# Patient Record
Sex: MALE | Race: WHITE | NOT HISPANIC OR LATINO | Employment: OTHER | URBAN - NONMETROPOLITAN AREA
[De-identification: names, ages, dates, MRNs, and addresses within clinical notes are randomized per-mention and may not be internally consistent; named-entity substitution may affect disease eponyms.]

---

## 2019-04-15 ENCOUNTER — APPOINTMENT (OUTPATIENT)
Dept: GENERAL RADIOLOGY | Facility: HOSPITAL | Age: 74
End: 2019-04-15

## 2019-04-15 ENCOUNTER — HOSPITAL ENCOUNTER (EMERGENCY)
Facility: HOSPITAL | Age: 74
Discharge: HOME | End: 2019-04-15
Attending: EMERGENCY MEDICINE | Admitting: EMERGENCY MEDICINE

## 2019-04-15 VITALS
HEIGHT: 69 IN | RESPIRATION RATE: 16 BRPM | WEIGHT: 220 LBS | BODY MASS INDEX: 32.58 KG/M2 | OXYGEN SATURATION: 96 % | HEART RATE: 64 BPM | DIASTOLIC BLOOD PRESSURE: 65 MMHG | TEMPERATURE: 97.8 F | SYSTOLIC BLOOD PRESSURE: 122 MMHG

## 2019-04-15 DIAGNOSIS — S60.222A CONTUSION OF LEFT HAND, INITIAL ENCOUNTER: Primary | ICD-10-CM

## 2019-04-15 PROCEDURE — 73130 X-RAY EXAM OF HAND: CPT

## 2019-04-15 PROCEDURE — 73130 X-RAY EXAM OF HAND: CPT | Performed by: RADIOLOGY

## 2019-04-15 PROCEDURE — 99283 EMERGENCY DEPT VISIT LOW MDM: CPT

## 2019-04-15 RX ORDER — AMIODARONE HYDROCHLORIDE 200 MG/1
200 TABLET ORAL DAILY
COMMUNITY
End: 2019-06-06 | Stop reason: SDUPTHER

## 2019-04-15 RX ORDER — POTASSIUM CITRATE 5 MEQ/1
20 TABLET, EXTENDED RELEASE ORAL
COMMUNITY
End: 2019-06-06

## 2019-04-15 RX ORDER — RANOLAZINE 500 MG/1
500 TABLET, EXTENDED RELEASE ORAL 2 TIMES DAILY
COMMUNITY
End: 2019-06-06 | Stop reason: SDUPTHER

## 2019-04-15 RX ORDER — GLIPIZIDE 5 MG/1
5 TABLET, FILM COATED, EXTENDED RELEASE ORAL DAILY
COMMUNITY
End: 2019-06-06 | Stop reason: SDUPTHER

## 2019-04-15 RX ORDER — EZETIMIBE AND SIMVASTATIN 10; 20 MG/1; MG/1
1 TABLET ORAL NIGHTLY
COMMUNITY
End: 2019-06-06 | Stop reason: SDUPTHER

## 2019-04-15 RX ORDER — CHLORTHALIDONE 25 MG/1
25 TABLET ORAL DAILY
COMMUNITY
End: 2019-06-08

## 2019-04-15 RX ORDER — FUROSEMIDE 40 MG/1
40 TABLET ORAL 2 TIMES DAILY
COMMUNITY
End: 2019-06-06

## 2019-04-15 RX ORDER — COLESEVELAM HYDROCHLORIDE 3.75 G/1
3.75 POWDER, FOR SUSPENSION ORAL DAILY
COMMUNITY
End: 2019-06-06 | Stop reason: SDUPTHER

## 2019-04-15 RX ORDER — AMLODIPINE BESYLATE AND BENAZEPRIL HYDROCHLORIDE 10; 20 MG/1; MG/1
1 CAPSULE ORAL DAILY
COMMUNITY
End: 2019-06-06 | Stop reason: SDUPTHER

## 2019-04-15 RX ORDER — PIOGLITAZONEHYDROCHLORIDE 15 MG/1
15 TABLET ORAL 3 TIMES DAILY
COMMUNITY
End: 2019-06-06

## 2019-04-15 RX ORDER — PROPAFENONE HYDROCHLORIDE 225 MG/1
225 TABLET, FILM COATED ORAL EVERY 8 HOURS
COMMUNITY
End: 2019-06-06 | Stop reason: SDUPTHER

## 2019-05-24 RX ORDER — METOPROLOL TARTRATE 100 MG/1
1 TABLET ORAL 2 TIMES DAILY
COMMUNITY
Start: 2011-12-20 | End: 2019-05-24 | Stop reason: SDUPTHER

## 2019-05-24 RX ORDER — METOPROLOL TARTRATE 100 MG/1
100 TABLET ORAL 2 TIMES DAILY
Qty: 60 TABLET | Refills: 0 | Status: SHIPPED | OUTPATIENT
Start: 2019-05-24 | End: 2019-06-20 | Stop reason: SDUPTHER

## 2019-06-06 ENCOUNTER — OFFICE VISIT (OUTPATIENT)
Dept: FAMILY MEDICINE CLINIC | Facility: CLINIC | Age: 74
End: 2019-06-06

## 2019-06-06 VITALS
HEIGHT: 69 IN | SYSTOLIC BLOOD PRESSURE: 120 MMHG | WEIGHT: 211 LBS | BODY MASS INDEX: 31.25 KG/M2 | TEMPERATURE: 98.6 F | DIASTOLIC BLOOD PRESSURE: 70 MMHG | HEART RATE: 61 BPM | RESPIRATION RATE: 12 BRPM | OXYGEN SATURATION: 98 %

## 2019-06-06 DIAGNOSIS — L40.9 PSORIASIS: ICD-10-CM

## 2019-06-06 DIAGNOSIS — G89.29 CHRONIC RIGHT SHOULDER PAIN: ICD-10-CM

## 2019-06-06 DIAGNOSIS — Z00.00 HEALTHCARE MAINTENANCE: ICD-10-CM

## 2019-06-06 DIAGNOSIS — Z87.442 HISTORY OF NEPHROLITHIASIS: ICD-10-CM

## 2019-06-06 DIAGNOSIS — E53.8 B12 DEFICIENCY: ICD-10-CM

## 2019-06-06 DIAGNOSIS — Z86.010 HISTORY OF ADENOMATOUS POLYP OF COLON: ICD-10-CM

## 2019-06-06 DIAGNOSIS — Z23 ENCOUNTER FOR IMMUNIZATION: ICD-10-CM

## 2019-06-06 DIAGNOSIS — M25.511 CHRONIC RIGHT SHOULDER PAIN: ICD-10-CM

## 2019-06-06 DIAGNOSIS — I25.10 CORONARY ARTERY DISEASE INVOLVING NATIVE CORONARY ARTERY OF NATIVE HEART WITHOUT ANGINA PECTORIS: ICD-10-CM

## 2019-06-06 DIAGNOSIS — H02.403 PTOSIS OF BOTH EYELIDS: ICD-10-CM

## 2019-06-06 DIAGNOSIS — I48.0 PAROXYSMAL ATRIAL FIBRILLATION (HCC): ICD-10-CM

## 2019-06-06 DIAGNOSIS — I10 ESSENTIAL HYPERTENSION: ICD-10-CM

## 2019-06-06 DIAGNOSIS — E11.42 TYPE 2 DIABETES MELLITUS WITH PERIPHERAL NEUROPATHY (HCC): ICD-10-CM

## 2019-06-06 DIAGNOSIS — K21.9 GASTROESOPHAGEAL REFLUX DISEASE WITHOUT ESOPHAGITIS: ICD-10-CM

## 2019-06-06 DIAGNOSIS — E78.2 MIXED HYPERLIPIDEMIA: Primary | ICD-10-CM

## 2019-06-06 DIAGNOSIS — N52.01 ERECTILE DYSFUNCTION DUE TO ARTERIAL INSUFFICIENCY: ICD-10-CM

## 2019-06-06 PROBLEM — N52.9 ERECTILE DYSFUNCTION: Status: ACTIVE | Noted: 2019-06-06

## 2019-06-06 PROCEDURE — 96372 THER/PROPH/DIAG INJ SC/IM: CPT | Performed by: GENERAL PRACTICE

## 2019-06-06 PROCEDURE — 90471 IMMUNIZATION ADMIN: CPT | Performed by: GENERAL PRACTICE

## 2019-06-06 PROCEDURE — 90632 HEPA VACCINE ADULT IM: CPT | Performed by: GENERAL PRACTICE

## 2019-06-06 PROCEDURE — 36415 COLL VENOUS BLD VENIPUNCTURE: CPT | Performed by: GENERAL PRACTICE

## 2019-06-06 PROCEDURE — 99204 OFFICE O/P NEW MOD 45 MIN: CPT | Performed by: GENERAL PRACTICE

## 2019-06-06 PROCEDURE — 20610 DRAIN/INJ JOINT/BURSA W/O US: CPT | Performed by: GENERAL PRACTICE

## 2019-06-06 RX ORDER — LIDOCAINE HYDROCHLORIDE 10 MG/ML
1 INJECTION, SOLUTION INFILTRATION; PERINEURAL ONCE
Status: COMPLETED | OUTPATIENT
Start: 2019-06-06 | End: 2019-06-06

## 2019-06-06 RX ORDER — COLESEVELAM HYDROCHLORIDE 3.75 G/1
3.75 POWDER, FOR SUSPENSION ORAL DAILY
Qty: 90 PACKET | Refills: 3 | Status: SHIPPED | OUTPATIENT
Start: 2019-06-06 | End: 2019-07-10

## 2019-06-06 RX ORDER — CYANOCOBALAMIN 1000 UG/ML
1000 INJECTION, SOLUTION INTRAMUSCULAR; SUBCUTANEOUS
Status: DISCONTINUED | OUTPATIENT
Start: 2019-06-06 | End: 2021-01-01

## 2019-06-06 RX ORDER — TRIAMCINOLONE ACETONIDE 40 MG/ML
40 INJECTION, SUSPENSION INTRA-ARTICULAR; INTRAMUSCULAR ONCE
Status: COMPLETED | OUTPATIENT
Start: 2019-06-06 | End: 2019-06-06

## 2019-06-06 RX ORDER — AMLODIPINE BESYLATE AND BENAZEPRIL HYDROCHLORIDE 10; 20 MG/1; MG/1
1 CAPSULE ORAL NIGHTLY
Qty: 90 CAPSULE | Refills: 3 | Status: SHIPPED | OUTPATIENT
Start: 2019-06-06 | End: 2019-06-28 | Stop reason: ALTCHOICE

## 2019-06-06 RX ORDER — GLIPIZIDE 5 MG/1
5 TABLET, FILM COATED, EXTENDED RELEASE ORAL DAILY
Qty: 90 TABLET | Refills: 3 | Status: SHIPPED | OUTPATIENT
Start: 2019-06-06 | End: 2019-07-18

## 2019-06-06 RX ORDER — PROPAFENONE HYDROCHLORIDE 225 MG/1
225 TABLET, FILM COATED ORAL EVERY 8 HOURS
Qty: 270 TABLET | Refills: 3 | Status: SHIPPED | OUTPATIENT
Start: 2019-06-06 | End: 2020-01-28 | Stop reason: SDUPTHER

## 2019-06-06 RX ORDER — LANOLIN ALCOHOL/MO/W.PET/CERES
1000 CREAM (GRAM) TOPICAL DAILY
Qty: 30 TABLET | Refills: 5 | Status: ON HOLD
Start: 2019-06-06 | End: 2021-01-01

## 2019-06-06 RX ORDER — RANOLAZINE 500 MG/1
500 TABLET, EXTENDED RELEASE ORAL 2 TIMES DAILY
Qty: 180 TABLET | Refills: 3 | Status: SHIPPED | OUTPATIENT
Start: 2019-06-06 | End: 2019-07-11 | Stop reason: ALTCHOICE

## 2019-06-06 RX ORDER — EZETIMIBE AND SIMVASTATIN 10; 20 MG/1; MG/1
1 TABLET ORAL NIGHTLY
Qty: 90 TABLET | Refills: 3 | Status: SHIPPED | OUTPATIENT
Start: 2019-06-06 | End: 2019-09-24

## 2019-06-06 RX ORDER — AMIODARONE HYDROCHLORIDE 200 MG/1
200 TABLET ORAL DAILY
Qty: 90 TABLET | Refills: 3 | Status: SHIPPED | OUTPATIENT
Start: 2019-06-06 | End: 2020-01-01 | Stop reason: SDUPTHER

## 2019-06-06 RX ADMIN — TRIAMCINOLONE ACETONIDE 40 MG: 40 INJECTION, SUSPENSION INTRA-ARTICULAR; INTRAMUSCULAR at 14:00

## 2019-06-06 RX ADMIN — LIDOCAINE HYDROCHLORIDE 1 ML: 10 INJECTION, SOLUTION INFILTRATION; PERINEURAL at 13:59

## 2019-06-06 RX ADMIN — CYANOCOBALAMIN 1000 MCG: 1000 INJECTION, SOLUTION INTRAMUSCULAR; SUBCUTANEOUS at 13:59

## 2019-06-06 NOTE — PROGRESS NOTES
Procedures  Shoulder Injection Procedure Note    Pre-operative Diagnosis: Right shoulder pain     Post-operative Diagnosis: Same    Indications: Symptomatic relief     Anesthesia: Lidocaine 1% without epinephrine     Procedure Details     Verbal consent was obtained for the procedure. The shoulder was prepped with iodine and the skin was anesthetized. Using a 25 gauge needle the glenohumeral joint is injected with 1 mL 1% lidocaine and 1 mL of triamcinolone (KENALOG) 40mg/ml under the lateral aspect of the acromion. The injection site was cleansed with topical isopropyl alcohol and a dressing was applied.    Complications:  None; patient tolerated the procedure well.

## 2019-06-06 NOTE — PROGRESS NOTES
Subjective   Aaron Delgado is a 73 y.o. male.     History of Present Illness     [facility] 73[facility].  He returned to [place] several months ago after living in [place] for the last 6 years.    Coronary Artery Disease  S/P CABG. Since last here he was started on apixaban along with amiodarone and propafenone for apparent atrial fibrillation.  While he admits to bilateral lower extremity edema there is no history of any chest pain or palpitations and he denies any lightheadedness, shortness of breath.  When started on apixaban he was advised to discontinue ASA    Type 2 Diabetes Mellitus  He admits to mild numbness and tingling of both feet.  There is no history of any polyuria, polydipsia, skin breakdown, or hypoglycemia.  He is currently on glipizide alone.  Has had no recent labs.  His last diabetic eye exam was within 1 year.  He was referred for a bilateral ptosis correction but did not proceed with this as the associated anesthesiologist apparently did not take his insurance    Hyperlipidemia  He is currently on simvastatin, ezetimibe, and colesevelam with no apparent side effects.    Hypertension  He is currently on metoprolol and amlodipine, and benazepril with no apparent side effects.    Right Shoulder Pain  He gives a long history of increasing right shoulder pain.  There is no history of any strain or trauma nor any change in his activities.  The pain is described as a sharp ache with activities above shoulder height.  This does not radiate elsewhere and has been on associated with any other symptoms.  There is no history of any stiffness or swelling and he denies any weakness, numbness, or tingling.  He is right-hand dominant.  He has received several corticosteroid injections her last 5 years generally with a marked improvement and would like another today    The following portions of the patient's history were reviewed and updated as appropriate: allergies,  current medications, past family history, past medical history, past social history, past surgical history and problem list.    Review of Systems   Constitutional: Negative for appetite change, chills, fatigue, fever and unexpected weight change.   HENT: Negative for congestion, ear pain, rhinorrhea, sneezing, sore throat and voice change.    Eyes: Negative for visual disturbance.   Respiratory: Negative for cough, shortness of breath and wheezing.    Cardiovascular: Positive for leg swelling. Negative for chest pain and palpitations.   Gastrointestinal: Negative for abdominal pain, blood in stool, constipation, diarrhea, nausea and vomiting.   Endocrine: Negative for polydipsia and polyuria.   Genitourinary: Negative for difficulty urinating, dysuria, frequency, hematuria and urgency.   Musculoskeletal: Positive for arthralgias. Negative for back pain, joint swelling, myalgias and neck pain.   Skin: Negative for color change.   Neurological: Negative for tremors, weakness, numbness and headaches.   Psychiatric/Behavioral: Negative for dysphoric mood, sleep disturbance and suicidal ideas. The patient is not nervous/anxious.      Objective   Physical Exam   Constitutional: He is oriented to person, place, and time. He appears well-developed and well-nourished. He is cooperative. He does not have a sickly appearance. No distress.   Bright and in good spirits. No apparent distress.  Wearing bilateral lower extremity compression hose.  No pallor, jaundice, diaphoresis, or cyanosis.   HENT:   Head: Atraumatic.   Right Ear: Tympanic membrane, external ear and ear canal normal.   Left Ear: Tympanic membrane, external ear and ear canal normal.   Mouth/Throat: Oropharynx is clear and moist.   Eyes: EOM are normal. Pupils are equal, round, and reactive to light. No scleral icterus.   Neck: No JVD present. Carotid bruit is not present. No tracheal deviation present. No thyromegaly present.   Cardiovascular: Normal rate,  regular rhythm, S1 normal, S2 normal, normal heart sounds and intact distal pulses. Exam reveals no gallop.   No murmur heard.  Pulmonary/Chest: Breath sounds normal. He has no wheezes. He has no rales.   Abdominal: Soft. Normal aorta and bowel sounds are normal. He exhibits no abdominal bruit and no mass. There is no hepatosplenomegaly. There is no tenderness. No hernia.   Musculoskeletal: He exhibits no deformity.        Right shoulder: He exhibits decreased range of motion (mild limitation of abduction and flexion) and bony tenderness (along AC and lateral GH joint). He exhibits no effusion and no crepitus.   Lymphadenopathy:        Head (right side): No submandibular adenopathy present.        Head (left side): No submandibular adenopathy present.     He has no cervical adenopathy.   Neurological: He is alert and oriented to person, place, and time. He has normal strength and normal reflexes. He displays normal reflexes. No cranial nerve deficit. He exhibits normal muscle tone. Coordination normal.   Skin: Skin is warm and dry. No rash noted. He is not diaphoretic. No pallor. Nails show no clubbing.   Psychiatric: He has a normal mood and affect. His behavior is normal.     Assessment/Plan   Problems Addressed this Visit        Cardiovascular and Mediastinum    CAD (coronary artery disease)  Reminded regarding the importance of risk factor modification.  Continue current medication  Referral to a local cardiologist    Relevant Medications    ranolazine (RANEXA) 500 MG 12 hr tablet    Other Relevant Orders    Ambulatory Referral to Cardiology    Mixed hyperlipidemia   Encouraged to continue to work on his diet and exercise plan.  Continue current medication  Updated labs drawn.  Will likely change to high-dose statin therapy at his return    Relevant Medications    colesevelam (WELCHOL) 3.75 g pack pack    ezetimibe-simvastatin (VYTORIN) 10-20 MG per tablet    Other Relevant Orders    Lipid Panel    TSH     Essential hypertension   Hypertension: at goal. Evidence of target organ damage: coronary artery disease.   As above  Continue current medication    Relevant Medications    amLODIPine-benazepril (LOTREL) 10-20 MG per capsule    Other Relevant Orders    CBC & Differential    Comprehensive Metabolic Panel    Paroxysmal atrial fibrillation (CMS/HCC)   Rate control is appropriate.. Patient is anticoagulated.   Avoid caffeine.  Avoid oral decongestants.  Continue current medication.   Follow up with cardiology.    Relevant Medications    amiodarone (PACERONE) 200 MG tablet    apixaban (ELIQUIS) 5 MG tablet tablet    propafenone (RYTHMOL) 225 MG tablet    ranolazine (RANEXA) 500 MG 12 hr tablet    Other Relevant Orders    Ambulatory Referral to Cardiology       Digestive    Gastroesophageal reflux disease without esophagitis    B12 deficiency  We will start on oral replacement with injections at visits  Will continue to monitor    Relevant Medications    cyanocobalamin injection 1,000 mcg    vitamin B-12 (CYANOCOBALAMIN) 1000 MCG tablet       Endocrine    Type 2 diabetes mellitus with peripheral neuropathy (CMS/HCC)  Diabetes mellitus Type II, under unknown control.   Encouraged to continue to pursue ADA diet  Encouraged aerobic exercise.  Continue current medication  Given his history of coronary artery disease will discuss replacement of glipizide with metformin and jardiance at his return    Relevant Medications    glipiZIDE (GLUCOTROL XL) 5 MG ER tablet    Other Relevant Orders    Hemoglobin A1c    MicroAlbumin, Urine, Random -       Nervous and Auditory    Chronic right shoulder pain  Advised regarding rest, gentle exercise, ice and heat.  Agreed on a corticosteroid injection.  See procedure note  Encouraged to report if any worse, any new symptoms, or if no better over the next week    Relevant Medications    lidocaine (XYLOCAINE) 1 % injection 1 mL (Completed)    triamcinolone acetonide (KENALOG-40) injection 40 mg  (Completed)       Musculoskeletal and Integument    Psoriasis       Genitourinary    Erectile dysfunction       Other    History of nephrolithiasis    Healthcare maintenance  Reviewed the potential benefits and risks of hepatitis A immunizations. Patient wished to proceed with this and dose # 1 administered. Patient is aware that a second dose is required in 6 months.  Also due for either a prevnar or pneumovax along with shingrix    Relevant Orders    Hepatitis C Antibody    Hepatitis A Vaccine Adult IM (Completed)    History of adenomatous polyp of colon    Ptosis of both eyelids  Referral to oculoplastic surgery      Relevant Orders    Ambulatory Referral to Ophthalmology      Other Visit Diagnoses     Encounter for immunization        Relevant Orders    Hepatitis A Vaccine Adult IM (Completed)

## 2019-06-07 LAB
ALBUMIN SERPL-MCNC: 3.9 G/DL (ref 3.5–5.2)
ALBUMIN/GLOB SERPL: 1.3 G/DL
ALP SERPL-CCNC: 203 U/L (ref 39–117)
ALT SERPL-CCNC: 73 U/L (ref 1–41)
AST SERPL-CCNC: 71 U/L (ref 1–40)
BASOPHILS # BLD AUTO: 0.05 10*3/MM3 (ref 0–0.2)
BASOPHILS NFR BLD AUTO: 0.9 % (ref 0–1.5)
BILIRUB SERPL-MCNC: 0.5 MG/DL (ref 0.2–1.2)
BUN SERPL-MCNC: 13 MG/DL (ref 8–23)
BUN/CREAT SERPL: 11 (ref 7–25)
CALCIUM SERPL-MCNC: 10.7 MG/DL (ref 8.6–10.5)
CHLORIDE SERPL-SCNC: 93 MMOL/L (ref 98–107)
CHOLEST SERPL-MCNC: 118 MG/DL (ref 0–200)
CO2 SERPL-SCNC: 22.5 MMOL/L (ref 22–29)
CREAT SERPL-MCNC: 1.18 MG/DL (ref 0.76–1.27)
EOSINOPHIL # BLD AUTO: 0.13 10*3/MM3 (ref 0–0.4)
EOSINOPHIL NFR BLD AUTO: 2.5 % (ref 0.3–6.2)
ERYTHROCYTE [DISTWIDTH] IN BLOOD BY AUTOMATED COUNT: 14.4 % (ref 12.3–15.4)
GLOBULIN SER CALC-MCNC: 3.1 GM/DL
GLUCOSE SERPL-MCNC: 114 MG/DL (ref 65–99)
HBA1C MFR BLD: 6.2 % (ref 4.8–5.6)
HCT VFR BLD AUTO: 43.2 % (ref 37.5–51)
HCV AB S/CO SERPL IA: 0.1 S/CO RATIO (ref 0–0.9)
HDLC SERPL-MCNC: 33 MG/DL (ref 40–60)
HGB BLD-MCNC: 13.9 G/DL (ref 13–17.7)
IMM GRANULOCYTES # BLD AUTO: 0.02 10*3/MM3 (ref 0–0.05)
IMM GRANULOCYTES NFR BLD AUTO: 0.4 % (ref 0–0.5)
LDLC SERPL CALC-MCNC: 42 MG/DL (ref 0–100)
LYMPHOCYTES # BLD AUTO: 1.08 10*3/MM3 (ref 0.7–3.1)
LYMPHOCYTES NFR BLD AUTO: 20.5 % (ref 19.6–45.3)
MCH RBC QN AUTO: 28.7 PG (ref 26.6–33)
MCHC RBC AUTO-ENTMCNC: 32.2 G/DL (ref 31.5–35.7)
MCV RBC AUTO: 89.1 FL (ref 79–97)
MICROALBUMIN UR-MCNC: 40.8 UG/ML
MONOCYTES # BLD AUTO: 0.76 10*3/MM3 (ref 0.1–0.9)
MONOCYTES NFR BLD AUTO: 14.4 % (ref 5–12)
NEUTROPHILS # BLD AUTO: 3.23 10*3/MM3 (ref 1.7–7)
NEUTROPHILS NFR BLD AUTO: 61.3 % (ref 42.7–76)
NRBC BLD AUTO-RTO: 0 /100 WBC (ref 0–0.2)
PLATELET # BLD AUTO: 348 10*3/MM3 (ref 140–450)
POTASSIUM SERPL-SCNC: 4.7 MMOL/L (ref 3.5–5.2)
PROT SERPL-MCNC: 7 G/DL (ref 6–8.5)
RBC # BLD AUTO: 4.85 10*6/MM3 (ref 4.14–5.8)
SODIUM SERPL-SCNC: 128 MMOL/L (ref 136–145)
TRIGL SERPL-MCNC: 217 MG/DL (ref 0–150)
TSH SERPL DL<=0.005 MIU/L-ACNC: 11.65 UIU/ML (ref 0.45–4.5)
VLDLC SERPL CALC-MCNC: 43.4 MG/DL
WBC # BLD AUTO: 5.27 10*3/MM3 (ref 3.4–10.8)

## 2019-06-08 DIAGNOSIS — E87.1 HYPONATREMIA: Primary | ICD-10-CM

## 2019-06-08 DIAGNOSIS — R74.8 ELEVATED LIVER ENZYMES: ICD-10-CM

## 2019-06-10 NOTE — PROGRESS NOTES
Spoke with pt about the following per Dr. Nur. VH    -- Needs CXR and U/S abdomen at [facility]  - mayra placed the orders in epic

## 2019-06-17 ENCOUNTER — HOSPITAL ENCOUNTER (OUTPATIENT)
Dept: ULTRASOUND IMAGING | Facility: HOSPITAL | Age: 74
Discharge: HOME | End: 2019-06-17
Admitting: GENERAL PRACTICE

## 2019-06-17 DIAGNOSIS — R74.8 ELEVATED LIVER ENZYMES: ICD-10-CM

## 2019-06-17 PROCEDURE — 76700 US EXAM ABDOM COMPLETE: CPT | Performed by: RADIOLOGY

## 2019-06-17 PROCEDURE — 76700 US EXAM ABDOM COMPLETE: CPT

## 2019-06-22 RX ORDER — METOPROLOL TARTRATE 100 MG/1
TABLET ORAL
Qty: 60 TABLET | Refills: 5 | Status: SHIPPED | OUTPATIENT
Start: 2019-06-22 | End: 2019-06-28 | Stop reason: ALTCHOICE

## 2019-06-26 ENCOUNTER — TELEPHONE (OUTPATIENT)
Dept: FAMILY MEDICINE CLINIC | Facility: CLINIC | Age: 74
End: 2019-06-26

## 2019-06-26 NOTE — TELEPHONE ENCOUNTER
LVM      ----- Message from Dillan Nur MD sent at 6/22/2019  9:45 AM EDT -----  Please let patient know that his abdo U/S was fine  I meant for a CXR to be done the same day - he can go for this anytime at the hospital or diagnostic center

## 2019-06-28 ENCOUNTER — OFFICE VISIT (OUTPATIENT)
Dept: FAMILY MEDICINE CLINIC | Facility: CLINIC | Age: 74
End: 2019-06-28

## 2019-06-28 DIAGNOSIS — R79.9 ABNORMAL BLOOD CHEMISTRY: ICD-10-CM

## 2019-06-28 DIAGNOSIS — R42 VERTIGO: ICD-10-CM

## 2019-06-28 DIAGNOSIS — I10 ESSENTIAL HYPERTENSION: Primary | ICD-10-CM

## 2019-06-28 PROCEDURE — 99214 OFFICE O/P EST MOD 30 MIN: CPT | Performed by: NURSE PRACTITIONER

## 2019-06-28 RX ORDER — METOPROLOL SUCCINATE 100 MG/1
100 TABLET, EXTENDED RELEASE ORAL NIGHTLY
Qty: 30 TABLET | Refills: 0 | Status: SHIPPED | OUTPATIENT
Start: 2019-06-28 | End: 2019-07-18

## 2019-06-28 RX ORDER — MECLIZINE HYDROCHLORIDE 25 MG/1
25 TABLET ORAL 3 TIMES DAILY PRN
Qty: 30 TABLET | Refills: 1 | Status: SHIPPED | OUTPATIENT
Start: 2019-06-28 | End: 2019-07-11

## 2019-06-28 RX ORDER — BENAZEPRIL HYDROCHLORIDE 10 MG/1
10 TABLET ORAL EVERY MORNING
Qty: 30 TABLET | Refills: 0 | Status: SHIPPED | OUTPATIENT
Start: 2019-06-28 | End: 2019-07-31 | Stop reason: SDUPTHER

## 2019-06-28 NOTE — PROGRESS NOTES
Aaron Delgado is a 73 y.o. male.who presents to the clinic today C/O dizziness which started appx three months ago. Associated symptoms include fatigue and dizziness which worsens when he is standing and walking. His wife gave him a motion sickness pill yesterday without adequate relief. Aaron reports initially these episodes would occur sporadically but over the past several weeks are more frequent. He brings in a record of his vital signs this morning which include: BP: 108 mg/dL; BS of 119 mg/dL and Pulse of 52. He does report his dizziness does not occur when sitting or lying but upon standing and walking.  Aaron does have a H/O DM, type 2 with mild neuropathy, Atrial Fib, Dyslipidemia, and CAD.    Dizziness   This is a new problem. The current episode started more than 1 month ago. The problem occurs intermittently. The problem has been waxing and waning. Associated symptoms include fatigue, vertigo and weakness. Pertinent negatives include no abdominal pain, anorexia, chest pain, chills, congestion, coughing, diaphoresis, fever, headaches, nausea, rash or vomiting. The symptoms are aggravated by standing.   Refer to ROS for additional information.    The following portions of the patient's history were reviewed and updated as appropriate: allergies, current medications, past family history, past medical history, past social history, past surgical history and problem list.    Current Outpatient Medications:   •  amiodarone (PACERONE) 200 MG tablet, Take 1 tablet by mouth Daily., Disp: 90 tablet, Rfl: 3  •  apixaban (ELIQUIS) 5 MG tablet tablet, Take 1 tablet by mouth Every 12 (Twelve) Hours., Disp: 180 tablet, Rfl: 3  •  benazepril (LOTENSIN) 10 MG tablet, Take 1 tablet by mouth Every Morning., Disp: 30 tablet, Rfl: 0  •  colesevelam (WELCHOL) 3.75 g pack pack, Take 1 packet by mouth Daily., Disp: 90 packet, Rfl: 3  •  ezetimibe-simvastatin (VYTORIN) 10-20 MG per tablet, Take 1 tablet by mouth Every  Night., Disp: 90 tablet, Rfl: 3  •  glipiZIDE (GLUCOTROL XL) 5 MG ER tablet, Take 1 tablet by mouth Daily., Disp: 90 tablet, Rfl: 3  •  meclizine (ANTIVERT) 25 MG tablet, Take 1 tablet by mouth 3 (Three) Times a Day As Needed for dizziness., Disp: 30 tablet, Rfl: 1  •  metoprolol succinate XL (TOPROL-XL) 100 MG 24 hr tablet, Take 1 tablet by mouth Every Night., Disp: 30 tablet, Rfl: 0  •  propafenone (RYTHMOL) 225 MG tablet, Take 1 tablet by mouth Every 8 (Eight) Hours., Disp: 270 tablet, Rfl: 3  •  ranolazine (RANEXA) 500 MG 12 hr tablet, Take 1 tablet by mouth 2 (Two) Times a Day., Disp: 180 tablet, Rfl: 3  •  vitamin B-12 (CYANOCOBALAMIN) 1000 MCG tablet, Take 1 tablet by mouth Daily., Disp: 30 tablet, Rfl: 5    Current Facility-Administered Medications:   •  cyanocobalamin injection 1,000 mcg, 1,000 mcg, Intramuscular, Q28 Days, Dillan Nur MD, 1,000 mcg at 06/06/19 1359    No Known Allergies    Review of Systems   Constitutional: Positive for activity change and fatigue. Negative for appetite change, chills, diaphoresis and fever. Unexpected weight change: Has had a weight loss for past three months.   HENT: Negative.  Negative for congestion and ear pain.    Respiratory: Negative for cough, chest tightness and shortness of breath.    Cardiovascular: Positive for leg swelling (Intermittent). Negative for chest pain and palpitations.   Gastrointestinal: Negative for abdominal pain, anorexia, constipation, diarrhea, nausea and vomiting.   Endocrine: Negative for cold intolerance, heat intolerance, polydipsia, polyphagia and polyuria.   Skin: Negative for color change and rash.   Neurological: Positive for dizziness, vertigo and weakness. Negative for tremors, speech difficulty and headaches.   Hematological: Negative for adenopathy.   Psychiatric/Behavioral: Negative for confusion, decreased concentration and suicidal ideas. The patient is not nervous/anxious.    All other systems reviewed and are  "negative.    Visit Vitals  /60 (BP Location: Right arm, Patient Position: Sitting, Cuff Size: Adult)   Pulse 95   Temp 98.2 °F (36.8 °C) (Temporal)   Resp 14   Ht 175.3 cm (69\")   Wt 97.5 kg (215 lb)   SpO2 96%   BMI 31.75 kg/m²     Physical Exam   Constitutional: He is oriented to person, place, and time. He appears well-developed and well-nourished. No distress.   HENT:   Head: Normocephalic.   Right Ear: Tympanic membrane and ear canal normal.   Left Ear: Tympanic membrane and ear canal normal.   Nose: Nose normal.   Mouth/Throat: Oropharynx is clear and moist and mucous membranes are normal. No oropharyngeal exudate.   Eyes: Conjunctivae are normal. Pupils are equal, round, and reactive to light. Right eye exhibits no discharge. Left eye exhibits no discharge. No scleral icterus.   Neck: Neck supple. No JVD present.   Cardiovascular: Normal rate, regular rhythm and normal heart sounds. Exam reveals no friction rub.   No murmur heard.  Pulmonary/Chest: Effort normal and breath sounds normal. No respiratory distress. He has no decreased breath sounds. He has no wheezes. He has no rhonchi. He has no rales.   Abdominal: Soft.   Musculoskeletal: He exhibits no edema.   Lymphadenopathy:     He has no cervical adenopathy.   Neurological: He is alert and oriented to person, place, and time.   Skin: Skin is warm and dry. Capillary refill takes less than 2 seconds. No rash noted. No erythema.   Psychiatric: He has a normal mood and affect. His speech is normal and behavior is normal. Judgment and thought content normal. Cognition and memory are normal.   Vitals reviewed.    Lab Results (last 24 hours)     Procedure Component Value Units Date/Time    Comprehensive Metabolic Panel [[id number]]  (Abnormal) Collected:  06/28/19 1404    Specimen:  Blood Updated:  06/29/19 0712     Glucose 74 mg/dL      BUN 23 mg/dL      Creatinine 1.51 mg/dL      eGFR Non African Am 46 mL/min/1.73      Comment: The MDRD GFR formula is " only valid for adults with stable  renal function between ages 18 and 70.          eGFR African Am 55 mL/min/1.73      BUN/Creatinine Ratio 15.2     Sodium 127 mmol/L      Potassium 5.0 mmol/L      Chloride 93 mmol/L      Total CO2 24.0 mmol/L      Calcium 9.8 mg/dL      Total Protein 6.9 g/dL      Albumin 3.90 g/dL      Globulin 3.0 gm/dL      A/G Ratio 1.3 g/dL      Total Bilirubin 0.4 mg/dL      Alkaline Phosphatase 149 U/L      AST (SGOT) 53 U/L      ALT (SGPT) 56 U/L     Narrative:       Performed at:  [address] [address] [address] [place], [place]  [id number]  : Tanmay Benson MD, Phone:  [phone]        Assessment/Plan   Diagnoses and all orders for this visit:    Essential hypertension  -     metoprolol succinate XL (TOPROL-XL) 100 MG 24 hr tablet; Take 1 tablet by mouth Every Night.  -     benazepril (LOTENSIN) 10 MG tablet; Take 1 tablet by mouth Every Morning.    Abnormal blood chemistry  -     Comprehensive Metabolic Panel    Vertigo  -     meclizine (ANTIVERT) 25 MG tablet; Take 1 tablet by mouth 3 (Three) Times a Day As Needed for dizziness.    Findings and recommendations discussed with Aaron .Counseled regarding supportive care and safety measures including no driving until his symptoms have consistently improved.  Discussed with Dr. Nur who recommended discontinuing his Lotrel 10-20 to Lotensin only in am and discontinuing Metoprolol 100 mg bid to Toprol  mg at bedtime. Also, reminded Aaron  to have his  Chest X ray done prior to his July 10 th appointment with Dr Nur. Provided him with a logbook to record daily blood sugar, blood pressure and pulse and bring it into the office on the July 10 th appointment.   This document has been electronically signed by AMARILIS Stewart, EDEN-BC, BRAULIO  June 29, 2019 9:53 AM

## 2019-06-29 VITALS
HEART RATE: 95 BPM | WEIGHT: 215 LBS | BODY MASS INDEX: 31.84 KG/M2 | SYSTOLIC BLOOD PRESSURE: 118 MMHG | DIASTOLIC BLOOD PRESSURE: 60 MMHG | TEMPERATURE: 98.2 F | OXYGEN SATURATION: 96 % | RESPIRATION RATE: 14 BRPM | HEIGHT: 69 IN

## 2019-06-29 DIAGNOSIS — E87.1 HYPONATREMIA: Primary | ICD-10-CM

## 2019-06-29 LAB
ALBUMIN SERPL-MCNC: 3.9 G/DL (ref 3.5–5.2)
ALBUMIN/GLOB SERPL: 1.3 G/DL
ALP SERPL-CCNC: 149 U/L (ref 39–117)
ALT SERPL-CCNC: 56 U/L (ref 1–41)
AST SERPL-CCNC: 53 U/L (ref 1–40)
BILIRUB SERPL-MCNC: 0.4 MG/DL (ref 0.2–1.2)
BUN SERPL-MCNC: 23 MG/DL (ref 8–23)
BUN/CREAT SERPL: 15.2 (ref 7–25)
CALCIUM SERPL-MCNC: 9.8 MG/DL (ref 8.6–10.5)
CHLORIDE SERPL-SCNC: 93 MMOL/L (ref 98–107)
CO2 SERPL-SCNC: 24 MMOL/L (ref 22–29)
CREAT SERPL-MCNC: 1.51 MG/DL (ref 0.76–1.27)
GLOBULIN SER CALC-MCNC: 3 GM/DL
GLUCOSE SERPL-MCNC: 74 MG/DL (ref 65–99)
POTASSIUM SERPL-SCNC: 5 MMOL/L (ref 3.5–5.2)
PROT SERPL-MCNC: 6.9 G/DL (ref 6–8.5)
SODIUM SERPL-SCNC: 127 MMOL/L (ref 136–145)

## 2019-07-05 ENCOUNTER — LAB (OUTPATIENT)
Dept: FAMILY MEDICINE CLINIC | Facility: CLINIC | Age: 74
End: 2019-07-05

## 2019-07-05 DIAGNOSIS — E87.1 HYPONATREMIA: ICD-10-CM

## 2019-07-05 PROCEDURE — 36415 COLL VENOUS BLD VENIPUNCTURE: CPT | Performed by: GENERAL PRACTICE

## 2019-07-06 LAB
ALBUMIN SERPL-MCNC: 4 G/DL (ref 3.5–5.2)
ALBUMIN/GLOB SERPL: 1.7 G/DL
ALP SERPL-CCNC: 115 U/L (ref 39–117)
ALT SERPL-CCNC: 37 U/L (ref 1–41)
AST SERPL-CCNC: 40 U/L (ref 1–40)
BILIRUB SERPL-MCNC: 0.4 MG/DL (ref 0.2–1.2)
BUN SERPL-MCNC: 19 MG/DL (ref 8–23)
BUN/CREAT SERPL: 13.9 (ref 7–25)
CALCIUM SERPL-MCNC: 9.7 MG/DL (ref 8.6–10.5)
CHLORIDE SERPL-SCNC: 92 MMOL/L (ref 98–107)
CO2 SERPL-SCNC: 23.8 MMOL/L (ref 22–29)
CREAT SERPL-MCNC: 1.37 MG/DL (ref 0.76–1.27)
GLOBULIN SER CALC-MCNC: 2.4 GM/DL
GLUCOSE SERPL-MCNC: 78 MG/DL (ref 65–99)
POTASSIUM SERPL-SCNC: 5 MMOL/L (ref 3.5–5.2)
PROT SERPL-MCNC: 6.4 G/DL (ref 6–8.5)
SODIUM SERPL-SCNC: 129 MMOL/L (ref 136–145)

## 2019-07-08 NOTE — PROGRESS NOTES
[facility] CARDIOLOGY  [address]  [place] [address]  Phone: [phone]  Fax: [phone]    07/10/2019    Chief Complaint   Patient presents with   • Coronary Artery Disease   • Hypertension   • Hyperlipidemia   • Establish Cardiologist        History:   Aaron Delgado is a 73 y.o. male seen in consultation, referred by Dr.Paul Nur for dizziness.  Aaron has a history of Paroxysmal atrial fib coagulated with Eliquis 5 mg BID, CAD (s/p CABG), hypertension, and dyslipidemia.  He has complaints today of dizziness. Onset was about 3 months ago and has increased over the last month.  I happens when he stands up and when he gets up form lying down. No changes since PCP changed medications.  Happens daily wit    Past Medical History:   Diagnosis Date   • A-fib (CMS/HCC)    • CHF (congestive heart failure) (CMS/[facility])    • Hypertension        Past Surgical History:   Procedure Laterality Date   • CORONARY ARTERY BYPASS GRAFT          Review of Systems:  Please see HPI  Constitution: No chills, no rigors, no unexplained weight loss or weight gain  Eyes:  No diplopia, no blurred vision, no loss of vision, conjunctiva is pink and sclera is anicteric  ENT:  No tinnitus, no otorrhea, no epistaxis, no sore throat   Respiratory: No cough, no hemoptysis  Cardiovascular: see HPI  Gastrointestinal: No nausea, no vomiting, no hematemesis, no diarrhea or constipation, no melena  Genitourinary: No frequency of dysuria no hematuria  Integument: No pruritis and  no skin rash  Hematologic / Lymphatic: No excessive bleeding, easy bruising, fatigue, lymphadenopathy and petechiae  Musculoskeletal: No joint pain, joint stiffness, joint swelling, muscle pain, muscle weakness and neck pain  Neurological: No dizziness, headaches, light headedness, seizures and vertigo  Endocrine: No frequent urination and nocturia, temperature intolerance, weight gain, unintended and weight loss,  unintended        Past Social History:  Social History     Socioeconomic History   • Marital status:      Spouse name: Not on file   • Number of children: Not on file   • Years of education: Not on file   • Highest education level: Not on file   Occupational History   • Occupation: Retired   Tobacco Use   • Smoking status: Never Smoker   • Smokeless tobacco: Never Used   Substance and Sexual Activity   • Alcohol use: No     Frequency: Never   • Drug use: No   • Sexual activity: Defer       Past Family History:  History reviewed. No pertinent family history.    Current Outpatient Medications   Medication Sig Dispense Refill   • amiodarone (PACERONE) 200 MG tablet Take 1 tablet by mouth Daily. 90 tablet 3   • apixaban (ELIQUIS) 5 MG tablet tablet Take 1 tablet by mouth Every 12 (Twelve) Hours. 180 tablet 3   • benazepril (LOTENSIN) 10 MG tablet Take 1 tablet by mouth Every Morning. 30 tablet 0   • chlorthalidone (HYGROTON) 25 MG tablet      • colesevelam (WELCHOL) 3.75 g pack pack Take 1 packet by mouth Daily. 90 packet 3   • glipiZIDE (GLUCOTROL XL) 5 MG ER tablet Take 1 tablet by mouth Daily. 90 tablet 3   • meclizine (ANTIVERT) 25 MG tablet Take 1 tablet by mouth 3 (Three) Times a Day As Needed for dizziness. 30 tablet 1   • metoprolol succinate XL (TOPROL-XL) 100 MG 24 hr tablet Take 1 tablet by mouth Every Night. 30 tablet 0   • propafenone (RYTHMOL) 225 MG tablet Take 1 tablet by mouth Every 8 (Eight) Hours. 270 tablet 3   • ranolazine (RANEXA) 500 MG 12 hr tablet Take 1 tablet by mouth 2 (Two) Times a Day. 180 tablet 3   • vitamin B-12 (CYANOCOBALAMIN) 1000 MCG tablet Take 1 tablet by mouth Daily. 30 tablet 5   • ezetimibe-simvastatin (VYTORIN) 10-20 MG per tablet Take 1 tablet by mouth Every Night. 90 tablet 3     Current Facility-Administered Medications   Medication Dose Route Frequency Provider Last Rate Last Dose   • cyanocobalamin injection 1,000 mcg  1,000 mcg Intramuscular Q28 Days Dillan Nur  MD Leonie   1,000 mcg at 06/06/19 1359        No Known Allergies    Objective:  Vitals:    07/10/19 1514   BP: 132/66   Pulse: 79   SpO2: 93%         Comfortable NAD  PERRL, conjunctiva clear  Neck supple, no JVD or thyromegaly appreciated  S1/S2 RRR, no m/r/g  Lungs CTA B, normal effort  Abdomen S/NT/ND (+) BS, no HSM appreciated  Extremities warm, no clubbing, cyanosis, or edema  Normal gait  No visible or palpable skin lesions  A/Ox4, mood and affect appropriate  Pulse exam:   Feet are warm bilateral  1+ edema bilateral  Capillary refill is normal  No evidence of ulceration or color change of the toes  PULSES  Right DP and PT are 1+ and Left DP and PT are 1+    DATA:              Results for orders placed during the hospital encounter of 06/17/19   US Abdomen Complete    Narrative EXAMINATION: US ABDOMEN COMPLETE-         CLINICAL INDICATION:     elevated liver enzymes; R74.8-Abnormal levels  of other serum enzymes     TECHNIQUE: Multiplanar gray scale ultrasound of the abdomen.      COMPARISON: NONE      FINDINGS:   Visualized pancreas is unremarkable.  The imaged portion of the abdominal aorta is nondilated.   The liver is homogeneous. There is no intrahepatic ductal dilatation or  focal hepatic mass.  The imaged portion of the hepatic vessels and inferior vena cava are  patent.  Gallbladder has been surgically removed  The common bile duct is normal, measuring 4.4 mm.  The kidneys demonstrate no evidence of hydronephrosis or solid renal  mass.  The spleen is homogeneous and measures  12.59 cm       Impression Nothing seen on today's exam to account for the patient's  symptoms.      This report was finalized on 6/17/2019 10:40 AM by Dr. Abner Walsh MD.            ECG 12 Lead  Date/Time: 7/10/2019 3:17 PM  Performed by: Ian Bishop MD  Authorized by: Ian Bishop MD           EKG normal sinus rhythm and normal    Assessment:  Benign positional vertigo  Dizziness  Paroxsymal atrial fib, rhythm  controlled with Amiodarone, Eliquis, and Rythmol  Hypertension  CAD S/P CABG  Dyslipidemia    Plan:    I will proceed with a Carotid US.   DC Ranexa, Chlorthalidone, and Welchol  Records from Dr. Chaney in [place]  Follow up 2 months    Patient's Body mass index is 30.83 kg/m². BMI is above normal parameters. Recommendations include: exercise counseling and nutrition counseling.         ICD-10-CM ICD-9-CM   1. Coronary artery disease involving native coronary artery of native heart without angina pectoris I25.10 414.01   2. Mixed hyperlipidemia E78.2 272.2   3. Essential hypertension I10 401.9   4. Paroxysmal atrial fibrillation (CMS/HCC) I48.0 427.31        Thank you for allowing me to participate in the care of Aaron Delgado. Feel free to contact me directly with any further questions or concerns.        Director, Cardiac Cath Lab      JOSE Corona, acting as scribe for Ian Bishop MD.   07/10/19  3:16 PM

## 2019-07-10 ENCOUNTER — OFFICE VISIT (OUTPATIENT)
Dept: CARDIOLOGY | Facility: CLINIC | Age: 74
End: 2019-07-10

## 2019-07-10 VITALS
WEIGHT: 208.8 LBS | HEIGHT: 69 IN | BODY MASS INDEX: 30.93 KG/M2 | HEART RATE: 79 BPM | SYSTOLIC BLOOD PRESSURE: 132 MMHG | OXYGEN SATURATION: 93 % | DIASTOLIC BLOOD PRESSURE: 66 MMHG

## 2019-07-10 DIAGNOSIS — I10 ESSENTIAL HYPERTENSION: ICD-10-CM

## 2019-07-10 DIAGNOSIS — E78.2 MIXED HYPERLIPIDEMIA: ICD-10-CM

## 2019-07-10 DIAGNOSIS — I48.0 PAROXYSMAL ATRIAL FIBRILLATION (HCC): ICD-10-CM

## 2019-07-10 DIAGNOSIS — R42 DIZZINESS: ICD-10-CM

## 2019-07-10 DIAGNOSIS — I25.10 CORONARY ARTERY DISEASE INVOLVING NATIVE CORONARY ARTERY OF NATIVE HEART WITHOUT ANGINA PECTORIS: Primary | ICD-10-CM

## 2019-07-10 PROCEDURE — 93005 ELECTROCARDIOGRAM TRACING: CPT | Performed by: INTERNAL MEDICINE

## 2019-07-10 PROCEDURE — 99204 OFFICE O/P NEW MOD 45 MIN: CPT | Performed by: INTERNAL MEDICINE

## 2019-07-10 RX ORDER — CHLORTHALIDONE 25 MG/1
TABLET ORAL
COMMUNITY
Start: 2019-06-25 | End: 2019-07-10

## 2019-07-11 ENCOUNTER — OFFICE VISIT (OUTPATIENT)
Dept: FAMILY MEDICINE CLINIC | Facility: CLINIC | Age: 74
End: 2019-07-11

## 2019-07-11 VITALS
HEIGHT: 69 IN | OXYGEN SATURATION: 97 % | DIASTOLIC BLOOD PRESSURE: 60 MMHG | BODY MASS INDEX: 31.7 KG/M2 | HEART RATE: 70 BPM | SYSTOLIC BLOOD PRESSURE: 130 MMHG | TEMPERATURE: 97.7 F | RESPIRATION RATE: 14 BRPM | WEIGHT: 214 LBS

## 2019-07-11 DIAGNOSIS — R42 DIZZINESS: ICD-10-CM

## 2019-07-11 DIAGNOSIS — E87.1 HYPONATREMIA: ICD-10-CM

## 2019-07-11 DIAGNOSIS — I10 ESSENTIAL HYPERTENSION: Primary | Chronic | ICD-10-CM

## 2019-07-11 DIAGNOSIS — R74.8 ELEVATED LIVER ENZYMES: ICD-10-CM

## 2019-07-11 DIAGNOSIS — E11.42 TYPE 2 DIABETES MELLITUS WITH PERIPHERAL NEUROPATHY (HCC): Chronic | ICD-10-CM

## 2019-07-11 PROCEDURE — 99214 OFFICE O/P EST MOD 30 MIN: CPT | Performed by: NURSE PRACTITIONER

## 2019-07-11 RX ORDER — PIOGLITAZONEHYDROCHLORIDE 15 MG/1
15 TABLET ORAL DAILY
COMMUNITY
End: 2019-07-18

## 2019-07-11 NOTE — PROGRESS NOTES
History of Present Illness   Aaron Delgado is a 73 Y.O. male.who presents to the clinic today for follow up related to his recent labs pertaining to his hyponatremia and abnormal lab results he had on June 6th and June 28 th. In addition, several medications were changed in relation to his  dizziness which started appx three months ago. Associated symptoms include fatigue and dizziness which worsens when he is standing and walking. He was prescribed Meclizine at his last appointment which he reports has not helped.  Aaron reports initially these episodes would occur sporadically but over the past several weeks are more frequent. He brings in a record of his blood pressure and glucose readings for the past week.  Aaron does have a H/O DM, type 2 with mild neuropathy, Atrial Fib, Dyslipidemia, and CAD.  He did have an appointment with Dr Bishop, Interventionist Cardiologist, yesterday who discontinued his Ranexa.     Dizziness   This is a new problem. The current episode started more than 1 month ago. The problem occurs intermittently. The problem has been waxing and waning. Associated symptoms include fatigue, vertigo and weakness. Pertinent negatives include no abdominal pain, anorexia, chest pain, chills, congestion, coughing, diaphoresis, fever, headaches, nausea, rash or vomiting. The symptoms are aggravated by standing.   Refer to ROS for additional information.    The following portions of the patient's history were reviewed and updated as appropriate: allergies, current medications, past family history, past medical history, past social history, past surgical history and problem list.    Current Outpatient Medications:   •  amiodarone (PACERONE) 200 MG tablet, Take 1 tablet by mouth Daily., Disp: 90 tablet, Rfl: 3  •  apixaban (ELIQUIS) 5 MG tablet tablet, Take 1 tablet by mouth Every 12 (Twelve) Hours., Disp: 180 tablet, Rfl: 3  •  benazepril (LOTENSIN) 10 MG tablet, Take 1 tablet by mouth Every  "Morning., Disp: 30 tablet, Rfl: 0  •  ezetimibe-simvastatin (VYTORIN) 10-20 MG per tablet, Take 1 tablet by mouth Every Night., Disp: 90 tablet, Rfl: 3  •  glipiZIDE (GLUCOTROL XL) 5 MG ER tablet, Take 1 tablet by mouth Daily., Disp: 90 tablet, Rfl: 3  •  metoprolol succinate XL (TOPROL-XL) 100 MG 24 hr tablet, Take 1 tablet by mouth Every Night., Disp: 30 tablet, Rfl: 0  •  pioglitazone (ACTOS) 15 MG tablet, Take 15 mg by mouth Daily., Disp: , Rfl:   •  propafenone (RYTHMOL) 225 MG tablet, Take 1 tablet by mouth Every 8 (Eight) Hours., Disp: 270 tablet, Rfl: 3  •  vitamin B-12 (CYANOCOBALAMIN) 1000 MCG tablet, Take 1 tablet by mouth Daily., Disp: 30 tablet, Rfl: 5    Current Facility-Administered Medications:   •  cyanocobalamin injection 1,000 mcg, 1,000 mcg, Intramuscular, Q28 Days, Dillan Nur MD, 1,000 mcg at 06/06/19 1359    No Known Allergies    Review of Systems   Constitutional: Positive for activity change and fatigue. Negative for appetite change and fever.   HENT: Negative for congestion.    Eyes: Negative for visual disturbance.   Respiratory: Negative for cough, shortness of breath and wheezing.    Cardiovascular: Positive for leg swelling (Intermittent). Negative for chest pain.   Gastrointestinal: Negative for nausea and vomiting.   Musculoskeletal: Positive for gait problem.   Skin: Negative for color change and rash.   Neurological: Positive for dizziness and weakness.   Hematological: Negative for adenopathy. Bruises/bleeds easily.     Visit Vitals  /60 (BP Location: Left arm, Patient Position: Sitting, Cuff Size: Adult)   Pulse 70   Temp 97.7 °F (36.5 °C) (Oral)   Resp 14   Ht 175.3 cm (69\")   Wt 97.1 kg (214 lb)   SpO2 97%   BMI 31.60 kg/m²     Physical Exam   Constitutional: He is oriented to person, place, and time. He appears well-developed and well-nourished. No distress.   HENT:   Head: Normocephalic.   Right Ear: Tympanic membrane and ear canal normal.   Left Ear: " Tympanic membrane and ear canal normal.   Nose: Nose normal.   Mouth/Throat: Oropharynx is clear and moist. No oropharyngeal exudate.   Eyes: Conjunctivae are normal. Pupils are equal, round, and reactive to light. No scleral icterus.   Neck: Neck supple. No tracheal tenderness present.   Cardiovascular: Normal rate, regular rhythm and normal heart sounds. Exam reveals no friction rub.   No murmur heard.  Pulmonary/Chest: Effort normal and breath sounds normal. No respiratory distress. He has no wheezes. He has no rales.   Musculoskeletal: He exhibits no edema or tenderness.   Lymphadenopathy:     He has no cervical adenopathy.   Neurological: He is alert and oriented to person, place, and time. No cranial nerve deficit.   Skin: Skin is warm and dry. No rash noted. No erythema.   Psychiatric: He has a normal mood and affect. His speech is normal and behavior is normal. Thought content normal. Cognition and memory are normal.   Vitals reviewed.      Results for orders placed or performed in visit on 07/05/19   Comprehensive Metabolic Panel   Result Value Ref Range    Glucose 78 65 - 99 mg/dL    BUN 19 8 - 23 mg/dL    Creatinine 1.37 (H) 0.76 - 1.27 mg/dL    eGFR Non African Am 51 (L) >60 mL/min/1.73    eGFR African Am 62 >60 mL/min/1.73    BUN/Creatinine Ratio 13.9 7.0 - 25.0    Sodium 129 (L) 136 - 145 mmol/L    Potassium 5.0 3.5 - 5.2 mmol/L    Chloride 92 (L) 98 - 107 mmol/L    Total CO2 23.8 22.0 - 29.0 mmol/L    Calcium 9.7 8.6 - 10.5 mg/dL    Total Protein 6.4 6.0 - 8.5 g/dL    Albumin 4.00 3.50 - 5.20 g/dL    Globulin 2.4 gm/dL    A/G Ratio 1.7 g/dL    Total Bilirubin 0.4 0.2 - 1.2 mg/dL    Alkaline Phosphatase 115 39 - 117 U/L    AST (SGOT) 40 1 - 40 U/L    ALT (SGPT) 37 1 - 41 U/L       Assessment/Plan   Diagnoses and all orders for this visit:    Essential hypertension  Comments:  Home bp readings were reviewed.     Type 2 diabetes mellitus with peripheral neuropathy (CMS/HCC)  Comments:  Blood glucose log  reviewed. Questions arose regarding his Metformin.     Elevated liver enzymes  Comments:  Improved lab results    Hyponatremia  Comments:  Mildly improved Na+ level    Dizziness  Comments:  Continue to monitor and hopefull discontinuation of Ranexa will help.       Findings and recommendations discussed with Aaron. Reviewed his recent lab results with him indicated improved liver function tests and sodium levels. He did bring in a daily log of his Blood pressure and glucose from June 29 th to present which was reviewed with him. Hopefully with the discontinuation of Ranexa his symptoms will improve. His medications were reviewed. Medications were discontinued which have been discontinued but he is taking a form of Metformin which is not on his list and taking it three times daily. Requested he bring in ALL of the medications he is currently taking so we have a complete accurate list of his medications when he sees Dr Nur. Also, reminded him to have his Chest X ray done prior to his appointment with Dr Nur on July 18 th. He will f/u with me on a prn basis.          This document has been electronically signed by AMARILIS Stewart, EDEN-BC, BRAULIO

## 2019-07-11 NOTE — PATIENT INSTRUCTIONS
Type 2 Diabetes Mellitus, Self Care, Adult  When you have type 2 diabetes (type 2 diabetes mellitus), you must make sure your blood sugar (glucose) stays in a healthy range. You can do this with:  · Nutrition.  · Exercise.  · Lifestyle changes.  · Medicines or insulin, if needed.  · Support from your doctors and others.    How to stay aware of blood sugar  · Check your blood sugar level every day, as often as told.  · Have your A1c (hemoglobin A1c) level checked two or more times a year. Have it checked more often if your doctor tells you to.  Your doctor will set personal treatment goals for you. Generally, you should have these blood sugar levels:  · Before meals (preprandial):  mg/dL (4.4-7.2 mmol/L).  · After meals (postprandial): below 180 mg/dL (10 mmol/L).  · A1c level: less than 7%.    How to manage high and low blood sugar  Signs of high blood sugar  High blood sugar is called hyperglycemia. Know the signs of high blood sugar. Signs may include:  · Feeling:  ? Thirsty.  ? Hungry.  ? Very tired.  · Needing to pee (urinate) more than usual.  · Blurry vision.    Signs of low blood sugar  Low blood sugar is called hypoglycemia. This is when blood sugar is at or below 70 mg/dL (3.9 mmol/L). Signs may include:  · Feeling:  ? Hungry.  ? Worried or nervous (anxious).  ? Sweaty and clammy.  ? Confused.  ? Dizzy.  ? Sleepy.  ? Sick to your stomach (nauseous).  · Having:  ? A fast heartbeat.  ? A headache.  ? A change in your vision.  ? Jerky movements that you cannot control (seizure).  ? Tingling or no feeling (numbness) around your mouth, lips, or tongue.  · Having trouble with:  ? Moving (coordination).  ? Sleeping.  ? Passing out (fainting).  ? Getting upset easily (irritability).    Treating low blood sugar  To treat low blood sugar, eat or drink something sugary right away. If you can think clearly and swallow safely, follow the 15:15 rule:  · Take 15 grams of a fast-acting carb (carbohydrate). Some  fast-acting carbs are:  ? 1 tube of glucose gel.  ? 3 sugar tablets (glucose pills).  ? 6-8 pieces of hard candy.  ? 4 oz (120 mL) of fruit juice.  ? 4 oz (120 mL) regular (not diet) soda.  · Check your blood sugar 15 minutes after you take the carb.  · If your blood sugar is still at or below 70 mg/dL (3.9 mmol/L), take 15 grams of a carb again.  · If your blood sugar does not go above 70 mg/dL (3.9 mmol/L) after 3 tries, get help right away.  · After your blood sugar goes back to normal, eat a meal or a snack within 1 hour.    Treating very low blood sugar  If your blood sugar is at or below 54 mg/dL (3 mmol/L), you have very low blood sugar (severe hypoglycemia). This is an emergency. Do not wait to see if the symptoms will go away. Get medical help right away. Call your local emergency services (911 in the [place]  If you have very low blood sugar and you cannot eat or drink, you may need a glucagon shot (injection). A family member or friend should learn how to check your blood sugar and how to give you a glucagon shot. Ask your doctor if you need to have a glucagon shot kit at home.  Follow these instructions at home:  Medicine  · Take insulin and diabetes medicines as told.  · If your doctor says you should take more or less insulin and medicines, do this exactly as told.  · Do not run out of insulin or medicines.  Having diabetes can raise your risk for other long-term conditions. These include heart disease and kidney disease. Your doctor may prescribe medicines to help you not have these problems.  Food  · Make healthy food choices. These include:  ? Chicken, fish, egg whites, and beans.  ? Oats, whole wheat, bulgur, brown rice, quinoa, and millet.  ? Fresh fruits and vegetables.  ? Low-fat dairy products.  ? Nuts, avocado, olive oil, and canola oil.  · Meet with a  (dietitian). He or she can help you make an eating plan that is right for you.  · Follow instructions from your doctor about  what you cannot eat or drink.  · Drink enough fluid to keep your pee (urine) pale yellow.  · Keep track of carbs that you eat. Do this by reading food labels and learning food serving sizes.  · Follow your sick day plan when you cannot eat or drink normally. Make this plan with your doctor so it is ready to use.  Activity  · Exercise 3 or more times a week.  · Do not go more than 2 days without exercising.  · Talk with your doctor before you start a new exercise. Your doctor may need to tell you to change:  ? How much insulin or medicines you take.  ? How much food you eat.  Lifestyle  · Do not use any tobacco products. These include cigarettes, chewing tobacco, and e-cigarettes. If you need help quitting, ask your doctor.  · Ask your doctor how much alcohol is safe for you.  · Learn to deal with stress. If you need help with this, ask your doctor.  Body care  · Stay up to date with your shots (immunizations).  · Have your eyes and feet checked by a doctor as often as told.  · Check your skin and feet every day. Check for cuts, bruises, redness, blisters, or sores.  · Brush your teeth and gums two times a day. Floss one or more times a day.  · Go to the dentist one or more times every 6 months.  · Stay at a healthy weight.  General instructions  · Take over-the-counter and prescription medicines only as told by your doctor.  · Share your diabetes care plan with:  ? Your work or school.  ? People you live with.  · Carry a card or wear jewelry that says you have diabetes.  · Keep all follow-up visits as told by your doctor. This is important.  Questions to ask your doctor  · Do I need to meet with a diabetes educator?  · Where can I find a support group for people with diabetes?  Where to find more information  To learn more about diabetes, visit:  · American Diabetes Association: www.diabetes.org  · American Association of Diabetes Educators: www.diabeteseducator.org    Summary  · When you have type 2 diabetes, you  must make sure your blood sugar (glucose) stays in a healthy range.  · Check your blood sugar every day, as often as told.  · Having diabetes can raise your risk for other conditions. Your doctor may prescribe medicines to help you not have these problems.  · Keep all follow-up visits as told by your doctor. This is important.  This information is not intended to replace advice given to you by your health care provider. Make sure you discuss any questions you have with your health care provider.  Document Released: 04/10/2017 Document Revised: 07/20/2018 Document Reviewed: 01/20/2017  [employer] Interactive Patient Education © 2019 [employer]  Dizziness  Dizziness is a common problem. It makes you feel unsteady or light-headed. You may feel like you are about to pass out (faint). Dizziness can lead to getting hurt if you stumble or fall. Dizziness can be caused by many things, including:  · Medicines.  · Not having enough water in your body (dehydration).  · Illness.    Follow these instructions at home:  Eating and drinking  · Drink enough fluid to keep your pee (urine) clear or pale yellow. This helps to keep you from getting dehydrated. Try to drink more clear fluids, such as water.  · Do not drink alcohol.  · Limit how much caffeine you drink or eat, if your doctor tells you to do that.  · Limit how much salt (sodium) you drink or eat, if your doctor tells you to do that.  Activity  · Avoid making quick movements.  ? When you stand up from sitting in a chair, steady yourself until you feel okay.  ? In the morning, first sit up on the side of the bed. When you feel okay, stand slowly while you hold onto something. Do this until you know that your balance is fine.  · If you need to  one place for a long time, move your legs often. Tighten and relax the muscles in your legs while you are standing.  · Do not drive or use heavy machinery if you feel dizzy.  · Avoid bending down if you feel dizzy. Place items  in your home so you can reach them easily without leaning over.  Lifestyle  · Do not use any products that contain nicotine or tobacco, such as cigarettes and e-cigarettes. If you need help quitting, ask your doctor.  · Try to lower your stress level. You can do this by using methods such as yoga or meditation. Talk with your doctor if you need help.  General instructions  · Watch your dizziness for any changes.  · Take over-the-counter and prescription medicines only as told by your doctor. Talk with your doctor if you think that you are dizzy because of a medicine that you are taking.  · Tell a friend or a family member that you are feeling dizzy. If he or she notices any changes in your behavior, have this person call your doctor.  · Keep all follow-up visits as told by your doctor. This is important.  Contact a doctor if:  · Your dizziness does not go away.  · Your dizziness or light-headedness gets worse.  · You feel sick to your stomach (nauseous).  · You have trouble hearing.  · You have new symptoms.  · You are unsteady on your feet.  · You feel like the room is spinning.  Get help right away if:  · You throw up (vomit) or have watery poop (diarrhea), and you cannot eat or drink anything.  · You have trouble:  ? Talking.  ? Walking.  ? Swallowing.  ? Using your arms, hands, or legs.  · You feel generally weak.  · You are not thinking clearly, or you have trouble forming sentences. A friend or family member may notice this.  · You have:  ? Chest pain.  ? Pain in your belly (abdomen).  ? Shortness of breath.  ? Sweating.  · Your vision changes.  · You are bleeding.  · You have a very bad headache.  · You have neck pain or a stiff neck.  · You have a fever.  These symptoms may be an emergency. Do not wait to see if the symptoms will go away. Get medical help right away. Call your local emergency services (911 in the [place] Do not drive yourself to the hospital.  Summary  · Dizziness makes you feel unsteady or  light-headed. You may feel like you are about to pass out (faint).  · Drink enough fluid to keep your pee (urine) clear or pale yellow. Do not drink alcohol.  · Avoid making quick movements if you feel dizzy.  · Watch your dizziness for any changes.  This information is not intended to replace advice given to you by your health care provider. Make sure you discuss any questions you have with your health care provider.  Document Released: 12/06/2012 Document Revised: 01/04/2018 Document Reviewed: 01/04/2018  [employer] Interactive Patient Education © 2019 [employer]

## 2019-07-15 ENCOUNTER — HOSPITAL ENCOUNTER (OUTPATIENT)
Dept: GENERAL RADIOLOGY | Facility: HOSPITAL | Age: 74
Discharge: HOME | End: 2019-07-15
Admitting: GENERAL PRACTICE

## 2019-07-15 PROCEDURE — 71046 X-RAY EXAM CHEST 2 VIEWS: CPT

## 2019-07-15 PROCEDURE — 71046 X-RAY EXAM CHEST 2 VIEWS: CPT | Performed by: RADIOLOGY

## 2019-07-17 ENCOUNTER — HOSPITAL ENCOUNTER (OUTPATIENT)
Dept: CARDIOLOGY | Facility: HOSPITAL | Age: 74
Discharge: HOME | End: 2019-07-17
Admitting: INTERNAL MEDICINE

## 2019-07-17 DIAGNOSIS — I48.0 PAROXYSMAL ATRIAL FIBRILLATION (HCC): ICD-10-CM

## 2019-07-17 DIAGNOSIS — R42 DIZZINESS: ICD-10-CM

## 2019-07-17 DIAGNOSIS — I25.10 CORONARY ARTERY DISEASE INVOLVING NATIVE CORONARY ARTERY OF NATIVE HEART WITHOUT ANGINA PECTORIS: ICD-10-CM

## 2019-07-17 PROCEDURE — 93880 EXTRACRANIAL BILAT STUDY: CPT | Performed by: RADIOLOGY

## 2019-07-17 PROCEDURE — 93880 EXTRACRANIAL BILAT STUDY: CPT

## 2019-07-18 ENCOUNTER — OFFICE VISIT (OUTPATIENT)
Dept: FAMILY MEDICINE CLINIC | Facility: CLINIC | Age: 74
End: 2019-07-18

## 2019-07-18 VITALS
OXYGEN SATURATION: 98 % | RESPIRATION RATE: 12 BRPM | BODY MASS INDEX: 31.55 KG/M2 | TEMPERATURE: 99.2 F | SYSTOLIC BLOOD PRESSURE: 155 MMHG | HEART RATE: 84 BPM | DIASTOLIC BLOOD PRESSURE: 70 MMHG | HEIGHT: 69 IN | WEIGHT: 213 LBS

## 2019-07-18 DIAGNOSIS — I25.10 CORONARY ARTERY DISEASE INVOLVING NATIVE CORONARY ARTERY OF NATIVE HEART WITHOUT ANGINA PECTORIS: ICD-10-CM

## 2019-07-18 DIAGNOSIS — I10 ESSENTIAL HYPERTENSION: ICD-10-CM

## 2019-07-18 DIAGNOSIS — E87.1 HYPONATREMIA: ICD-10-CM

## 2019-07-18 DIAGNOSIS — R74.8 ELEVATED LIVER ENZYMES: ICD-10-CM

## 2019-07-18 DIAGNOSIS — E53.8 B12 DEFICIENCY: ICD-10-CM

## 2019-07-18 DIAGNOSIS — Z00.00 HEALTHCARE MAINTENANCE: ICD-10-CM

## 2019-07-18 DIAGNOSIS — I48.0 PAROXYSMAL ATRIAL FIBRILLATION (HCC): Primary | ICD-10-CM

## 2019-07-18 DIAGNOSIS — G89.29 CHRONIC RIGHT SHOULDER PAIN: ICD-10-CM

## 2019-07-18 DIAGNOSIS — E11.42 TYPE 2 DIABETES MELLITUS WITH PERIPHERAL NEUROPATHY (HCC): ICD-10-CM

## 2019-07-18 DIAGNOSIS — E78.2 MIXED HYPERLIPIDEMIA: ICD-10-CM

## 2019-07-18 DIAGNOSIS — M25.511 CHRONIC RIGHT SHOULDER PAIN: ICD-10-CM

## 2019-07-18 DIAGNOSIS — K21.9 GASTROESOPHAGEAL REFLUX DISEASE WITHOUT ESOPHAGITIS: ICD-10-CM

## 2019-07-18 DIAGNOSIS — L40.9 PSORIASIS: ICD-10-CM

## 2019-07-18 DIAGNOSIS — R79.89 ELEVATED TSH: ICD-10-CM

## 2019-07-18 DIAGNOSIS — R42 VERTIGO: ICD-10-CM

## 2019-07-18 PROCEDURE — 99214 OFFICE O/P EST MOD 30 MIN: CPT | Performed by: GENERAL PRACTICE

## 2019-07-18 RX ORDER — PIOGLITAZONE HCL AND METFORMIN HCL 850; 15 MG/1; MG/1
1 TABLET ORAL 2 TIMES DAILY WITH MEALS
Qty: 180 TABLET | Refills: 0
Start: 2019-07-18 | End: 2019-08-13

## 2019-07-18 NOTE — PROGRESS NOTES
Eva Delgado is a 74 y.o. male.     History of Present Illness     Dizziness  He gives an approximate 6-month history of intermittent dizziness.  This is described as a sense of movement that occurs with changes in position and resolves promptly with rest.  To date he has had no associated symptoms and specifically denies any hearing loss, tinnitus, or difficulty with his eye hand coordination.  While records are unavailable he apparently underwent an MRI of the brain just prior to his return to [place] along with vestibular exercises through physical therapy.    Atrial Fibrillation  Patient has a history of atrial fibrillation. The patient denies palpitations, lightheadedness, diaphoresis or syncope. The patient has a past history of CAD, DM, HTN and hyperlipidemia.  He is currently prescribed apixaban. He denies  bleeding.    Coronary Artery Disease  S/P CABG.  He has noted an improvement in his lower extremity edema since last here.  He continues to deny any chest pain or palpitations and there is no history of any lightheadedness, or shortness of breath.     Type 2 Diabetes Mellitus  He admits to mild numbness and tingling of both feet.  There is no history of any polyuria, polydipsia, skin breakdown, or hypoglycemia.  He is currently on metformin, pioglitazone, and glipizide. His last diabetic eye exam was within 1 year.  He was referred for a bilateral ptosis correction but did not proceed with this as the associated anesthesiologist apparently did not take his insurance  Lab Results   Component Value Date    HGBA1C 6.20 (H) 06/06/2019     Hyperlipidemia  He is currently on simvastatin, and ezetimibewith no apparent side effects.  Lab Results   Component Value Date    CHLPL 118 06/06/2019    TRIG 217 (H) 06/06/2019    HDL 33 (L) 06/06/2019    LDL 42 06/06/2019     Hypertension  He is currently on  benazepril and chlorthalidone.  The latter was apparently started 3 months ago  Lab Results    Component Value Date    CREATININE 1.37 (H) 07/05/2019     Lab Results   Component Value Date    K 5.0 07/05/2019     Right Shoulder Pain  He denies any shoulder pain at present    The following portions of the patient's history were reviewed and updated as appropriate: allergies, current medications, past medical history, past social history and problem list.    Review of Systems   Constitutional: Negative for appetite change, chills, fatigue, fever and unexpected weight change.   HENT: Negative for congestion, ear pain, rhinorrhea, sneezing, sore throat and voice change.    Eyes: Negative for visual disturbance.   Respiratory: Negative for cough, shortness of breath and wheezing.    Cardiovascular: Positive for leg swelling. Negative for chest pain and palpitations.   Gastrointestinal: Negative for abdominal pain, blood in stool, constipation, diarrhea, nausea and vomiting.   Endocrine: Negative for polydipsia and polyuria.   Genitourinary: Negative for difficulty urinating, dysuria, frequency, hematuria and urgency.   Musculoskeletal: Positive for arthralgias. Negative for back pain, joint swelling, myalgias and neck pain.   Skin: Negative for color change.   Neurological: Positive for dizziness. Negative for tremors, weakness, numbness and headaches.   Psychiatric/Behavioral: Negative for dysphoric mood, sleep disturbance and suicidal ideas. The patient is not nervous/anxious.      Objective   Physical Exam   Constitutional: He is oriented to person, place, and time. He appears well-developed and well-nourished. He is cooperative. He does not have a sickly appearance. No distress.   Accompanied by his wife.  Bright and in good spirits. No apparent distress. No pallor, jaundice, diaphoresis, or cyanosis.   HENT:   Head: Atraumatic.   Right Ear: Tympanic membrane, external ear and ear canal normal.   Left Ear: Tympanic membrane, external ear and ear canal normal.   Mouth/Throat: Oropharynx is clear and moist.    Eyes: EOM are normal. Pupils are equal, round, and reactive to light. No scleral icterus.   Neck: No JVD present. Carotid bruit is not present. No tracheal deviation present. No thyromegaly present.   Cardiovascular: Normal rate, regular rhythm, S1 normal, S2 normal, normal heart sounds and intact distal pulses. Exam reveals no gallop.   No murmur heard.  Pulmonary/Chest: Breath sounds normal. He has no wheezes. He has no rales.   Abdominal: Soft. Normal aorta and bowel sounds are normal. He exhibits no abdominal bruit and no mass. There is no hepatosplenomegaly. There is no tenderness. No hernia.   Musculoskeletal: He exhibits no deformity.   No peripheral joint redness or warmth.     Vascular Status -  His right foot exhibits abnormal foot edema (trace). His left foot exhibits abnormal foot edema (trace).  Lymphadenopathy:        Head (right side): No submandibular adenopathy present.        Head (left side): No submandibular adenopathy present.     He has no cervical adenopathy.   Neurological: He is alert and oriented to person, place, and time. He has normal strength and normal reflexes. He displays normal reflexes. No cranial nerve deficit. He exhibits normal muscle tone. Coordination normal.   Skin: Skin is warm and dry. No rash noted. He is not diaphoretic. No pallor. Nails show no clubbing.   Psychiatric: He has a normal mood and affect. His behavior is normal.     Assessment/Plan   Problems Addressed this Visit        Cardiovascular and Mediastinum    CAD (coronary artery disease)  Reminded regarding the importance of risk factor modification.  Follow up with cardiology     Mixed hyperlipidemia  Encouraged to continue to work on his diet and exercise plan.  Will consider changing to high-dose statin therapy at his return    Essential hypertension   Hypertension: elevated today. Evidence of target organ damage: coronary artery disease and chronic kidney disease.   As above  Chlorthalidone will be  discontinued even his electrolyte abnormalities  Scheduled for an updated CMP in 2 to 3 weeks    Relevant Orders    Comprehensive Metabolic Panel    Paroxysmal atrial fibrillation (CMS/HCC) - Primary   Rate control is appropriate.. Patient is anticoagulated.   Avoid caffeine.  Avoid oral decongestants.   Follow up with cardiology.       Digestive    Gastroesophageal reflux disease without esophagitis    B12 deficiency  Corrected  Continue supplementation with monitoring.       Endocrine    Type 2 diabetes mellitus with peripheral neuropathy (CMS/HCC)  Diabetes mellitus Type II, under excellent control.   Encouraged to continue to pursue ADA diet  Encouraged aerobic exercise.  Glipizide will be replaced with Jardiance close monitoring of his renal function  Will taper pioglitazone/metformin and likely discontinue pioglitazone at his return possible addition of Victoza    Relevant Medications    pioglitazone-metFORMIN (ACTOPLUS MET)  MG per tablet    Empagliflozin (JARDIANCE) 10 MG tablet       Nervous and Auditory    Chronic right shoulder pain       Musculoskeletal and Integument    Psoriasis       Other    Healthcare maintenance  We will discuss a Prevnar 13 at his return    Hyponatremia  As above.    Relevant Orders    Comprehensive Metabolic Panel    Elevated liver enzymes  Etiology unclear  Will continue to monitor    Relevant Orders    Comprehensive Metabolic Panel    Vertigo  Peripheral   We will try to obtain records  Reviewed options going forward.  Patient will consider    Elevated TSH  Likely associated with amiodarone  Will monitor for now

## 2019-07-19 ENCOUNTER — TELEPHONE (OUTPATIENT)
Dept: CARDIOLOGY | Facility: CLINIC | Age: 74
End: 2019-07-19

## 2019-07-19 NOTE — TELEPHONE ENCOUNTER
I called the patient to go over his carotid u/s report. I had informed him that it did show he had some plaque build up on the right side of his carotid artery but I reassured him it was not significant per Zenobia.  I also let him know that his left carotid artery shows no sign of plaque and is free of disease. Patient was understanding. I also went ahead and scheduled him for September for a 2 month follow-up with Dr. Bishop.         ----- Message from AMARILIS Gallego sent at 7/17/2019  1:23 PM EDT -----  Please call patient.  Carotid US did show some plaque (blockage) in the carotid artery on the right side.  However, it is not significant.  The left carotid system is free of disease.      Thanks, Zenobia

## 2019-07-31 DIAGNOSIS — I10 ESSENTIAL HYPERTENSION: ICD-10-CM

## 2019-07-31 RX ORDER — BENAZEPRIL HYDROCHLORIDE 10 MG/1
10 TABLET ORAL EVERY MORNING
Qty: 30 TABLET | Refills: 5 | Status: SHIPPED | OUTPATIENT
Start: 2019-07-31 | End: 2019-08-13 | Stop reason: SDUPTHER

## 2019-08-01 ENCOUNTER — LAB (OUTPATIENT)
Dept: FAMILY MEDICINE CLINIC | Facility: CLINIC | Age: 74
End: 2019-08-01

## 2019-08-01 DIAGNOSIS — I10 ESSENTIAL HYPERTENSION: ICD-10-CM

## 2019-08-01 DIAGNOSIS — R74.8 ELEVATED LIVER ENZYMES: ICD-10-CM

## 2019-08-01 DIAGNOSIS — E87.1 HYPONATREMIA: ICD-10-CM

## 2019-08-02 LAB
ALBUMIN SERPL-MCNC: 4 G/DL (ref 3.5–5.2)
ALBUMIN/GLOB SERPL: 1.5 G/DL
ALP SERPL-CCNC: 129 U/L (ref 39–117)
ALT SERPL-CCNC: 58 U/L (ref 1–41)
AST SERPL-CCNC: 65 U/L (ref 1–40)
BILIRUB SERPL-MCNC: 0.4 MG/DL (ref 0.2–1.2)
BUN SERPL-MCNC: 16 MG/DL (ref 8–23)
BUN/CREAT SERPL: 11 (ref 7–25)
CALCIUM SERPL-MCNC: 9.6 MG/DL (ref 8.6–10.5)
CHLORIDE SERPL-SCNC: 106 MMOL/L (ref 98–107)
CO2 SERPL-SCNC: 24 MMOL/L (ref 22–29)
CREAT SERPL-MCNC: 1.45 MG/DL (ref 0.76–1.27)
GLOBULIN SER CALC-MCNC: 2.6 GM/DL
GLUCOSE SERPL-MCNC: 123 MG/DL (ref 65–99)
POTASSIUM SERPL-SCNC: 5 MMOL/L (ref 3.5–5.2)
PROT SERPL-MCNC: 6.6 G/DL (ref 6–8.5)
SODIUM SERPL-SCNC: 142 MMOL/L (ref 136–145)

## 2019-08-13 ENCOUNTER — OFFICE VISIT (OUTPATIENT)
Dept: FAMILY MEDICINE CLINIC | Facility: CLINIC | Age: 74
End: 2019-08-13

## 2019-08-13 DIAGNOSIS — H02.403 PTOSIS OF BOTH EYELIDS: ICD-10-CM

## 2019-08-13 DIAGNOSIS — I25.10 CORONARY ARTERY DISEASE INVOLVING NATIVE CORONARY ARTERY OF NATIVE HEART WITHOUT ANGINA PECTORIS: ICD-10-CM

## 2019-08-13 DIAGNOSIS — E11.42 TYPE 2 DIABETES MELLITUS WITH PERIPHERAL NEUROPATHY (HCC): ICD-10-CM

## 2019-08-13 DIAGNOSIS — I48.0 PAROXYSMAL ATRIAL FIBRILLATION (HCC): Primary | ICD-10-CM

## 2019-08-13 DIAGNOSIS — E53.8 B12 DEFICIENCY: ICD-10-CM

## 2019-08-13 DIAGNOSIS — R74.8 ELEVATED LIVER ENZYMES: ICD-10-CM

## 2019-08-13 DIAGNOSIS — R79.89 ELEVATED TSH: ICD-10-CM

## 2019-08-13 DIAGNOSIS — E87.1 HYPONATREMIA: ICD-10-CM

## 2019-08-13 DIAGNOSIS — I10 ESSENTIAL HYPERTENSION: ICD-10-CM

## 2019-08-13 DIAGNOSIS — Z00.00 HEALTHCARE MAINTENANCE: ICD-10-CM

## 2019-08-13 DIAGNOSIS — K21.9 GASTROESOPHAGEAL REFLUX DISEASE WITHOUT ESOPHAGITIS: ICD-10-CM

## 2019-08-13 DIAGNOSIS — N52.01 ERECTILE DYSFUNCTION DUE TO ARTERIAL INSUFFICIENCY: ICD-10-CM

## 2019-08-13 DIAGNOSIS — E78.2 MIXED HYPERLIPIDEMIA: ICD-10-CM

## 2019-08-13 PROCEDURE — 99214 OFFICE O/P EST MOD 30 MIN: CPT | Performed by: GENERAL PRACTICE

## 2019-08-13 RX ORDER — METFORMIN HYDROCHLORIDE 500 MG/1
2000 TABLET, EXTENDED RELEASE ORAL
Qty: 360 TABLET | Refills: 3 | Status: SHIPPED | OUTPATIENT
Start: 2019-08-13 | End: 2020-01-01

## 2019-08-13 RX ORDER — SILDENAFIL 100 MG/1
100 TABLET, FILM COATED ORAL DAILY PRN
Qty: 30 TABLET | Refills: 0 | Status: SHIPPED | OUTPATIENT
Start: 2019-08-13 | End: 2020-01-01 | Stop reason: SDUPTHER

## 2019-08-13 RX ORDER — BENAZEPRIL HYDROCHLORIDE 20 MG/1
20 TABLET ORAL NIGHTLY
Qty: 30 TABLET | Refills: 5 | Status: SHIPPED | OUTPATIENT
Start: 2019-08-13 | End: 2019-10-29 | Stop reason: SDUPTHER

## 2019-08-13 NOTE — PROGRESS NOTES
Subjective   Aaron Delgado is a 74 y.o. male.     History of Present Illness     Dizziness  He gives an approximate 7 month history of intermittent dizziness.  This is described as a sense of movement that occurs with changes in position and resolves promptly with rest.  To date he has had no associated symptoms and specifically denies any hearing loss, tinnitus, or difficulty with his eye hand coordination.  MRI of the brain performed on 2/18/2019 was unremarkable.  He was apparently taught vestibular exercises through physical therapy    Atrial Fibrillation  Patient has a history of atrial fibrillation. The patient denies palpitations, lightheadedness, diaphoresis or syncope. The patient has a past history of CAD, DM, HTN and hyperlipidemia.  He is currently prescribed apixaban. He denies  bleeding.  He is scheduled to undergo a cardiology reassessment with Dr. Bishop on 9/19/2019    Coronary Artery Disease  S/P CABG.  He has noted an improvement in his lower extremity edema since last here.  He continues to deny any chest pain or palpitations and there is no history of any lightheadedness, or shortness of breath.     Type 2 Diabetes Mellitus  He admits to mild numbness and tingling of both feet.  There is no history of any polyuria, polydipsia, skin breakdown, or hypoglycemia.  He is currently on metformin, pioglitazone, and empagliflozin. His last diabetic eye exam was within 1 year.  He was referred for a bilateral ptosis correction but did not proceed with this as the associated anesthesiologist apparently did not take his insurance.  Referral to Dr. Rao has been made    Hyperlipidemia  He remains on simvastatin, and ezetimibewith no apparent side effects.    Hypertension  He is currently on benazepril.  Chlorthalidone was discontinued due to hyponatremia  Lab Results   Component Value Date    CALCIUM 9.6 08/01/2019     08/01/2019    K 5.0 08/01/2019    CO2 24.0 08/01/2019     08/01/2019    BUN  16 08/01/2019    CREATININE 1.45 (H) 08/01/2019    EGFRIFAFRI 58 (L) 08/01/2019    EGFRIFNONA 48 (L) 08/01/2019    BCR 11.0 08/01/2019     Right Shoulder Pain  He denies any shoulder pain at present    The following portions of the patient's history were reviewed and updated as appropriate: allergies, current medications, past medical history, past social history and problem list.    Review of Systems   Constitutional: Negative for appetite change, chills, fatigue, fever and unexpected weight change.   HENT: Negative for congestion, ear pain, rhinorrhea, sneezing, sore throat and voice change.    Eyes: Negative for visual disturbance.   Respiratory: Negative for cough, shortness of breath and wheezing.    Cardiovascular: Positive for leg swelling. Negative for chest pain and palpitations.   Gastrointestinal: Negative for abdominal pain, blood in stool, constipation, diarrhea, nausea and vomiting.   Endocrine: Negative for polydipsia and polyuria.   Genitourinary: Negative for difficulty urinating, dysuria, frequency, hematuria and urgency.   Musculoskeletal: Positive for arthralgias. Negative for back pain, joint swelling, myalgias and neck pain.   Skin: Negative for color change.   Neurological: Positive for dizziness. Negative for tremors, weakness, numbness and headaches.   Psychiatric/Behavioral: Negative for dysphoric mood, sleep disturbance and suicidal ideas. The patient is not nervous/anxious.      Objective   Physical Exam   Constitutional: He is oriented to person, place, and time. He appears well-developed and well-nourished. He is cooperative. He does not have a sickly appearance. No distress.   Bright and in good spirits. No apparent distress. No pallor, jaundice, diaphoresis, or cyanosis.   HENT:   Head: Atraumatic.   Right Ear: Tympanic membrane, external ear and ear canal normal.   Left Ear: Tympanic membrane, external ear and ear canal normal.   Mouth/Throat: Oropharynx is clear and moist.   Eyes:  EOM are normal. Pupils are equal, round, and reactive to light. No scleral icterus.   Neck: No JVD present. Carotid bruit is not present. No tracheal deviation present. No thyromegaly present.   Cardiovascular: Normal rate, regular rhythm, S1 normal, S2 normal, normal heart sounds and intact distal pulses. Exam reveals no gallop.   No murmur heard.  Pulmonary/Chest: Breath sounds normal. He has no wheezes. He has no rales.   Abdominal: Soft. Normal aorta and bowel sounds are normal. He exhibits no abdominal bruit and no mass. There is no hepatosplenomegaly. There is no tenderness. No hernia.   Musculoskeletal: He exhibits no deformity.   No peripheral joint redness or warmth.     Vascular Status -  His right foot exhibits abnormal foot edema (trace). His left foot exhibits abnormal foot edema (trace).  Lymphadenopathy:        Head (right side): No submandibular adenopathy present.        Head (left side): No submandibular adenopathy present.     He has no cervical adenopathy.   Neurological: He is alert and oriented to person, place, and time. He has normal strength and normal reflexes. He displays normal reflexes. No cranial nerve deficit. He exhibits normal muscle tone. Coordination normal.   Skin: Skin is warm and dry. No rash noted. He is not diaphoretic. No pallor. Nails show no clubbing.   Psychiatric: He has a normal mood and affect. His behavior is normal.     Assessment/Plan   Problems Addressed this Visit        Cardiovascular and Mediastinum    CAD (coronary artery disease)  Reminded regarding the importance of risk factor modification.  Continue current medication  Follow up with cardiology             Mixed hyperlipidemia  Encouraged to continue to work on his diet and exercise plan.  Will consider changing to high-dose statin therapy at his return    Essential hypertension   Hypertension: elevated today. Evidence of target organ damage: coronary artery disease and chronic kidney disease.  We will  titrate benazepril    Relevant Medications    benazepril (LOTENSIN) 20 MG tablet    Paroxysmal atrial fibrillation (CMS/HCC)     Rate control is appropriate.. Patient is anticoagulated.   Avoid caffeine.  Avoid oral decongestants.  Continue current medication.   Follow up with cardiology.           Digestive    Gastroesophageal reflux disease without esophagitis    B12 deficiency       Endocrine    Type 2 diabetes mellitus with peripheral neuropathy (CMS/HCC)  Diabetes mellitus Type II, under excellent control.   Encouraged to continue to pursue ADA diet  Encouraged aerobic exercise.  Pioglitazone will be discontinued  Linagliptin will be added to empagliflozin as glyxambi   Scheduled for updated labs in 4 weeks    Relevant Medications    metFORMIN ER (GLUCOPHAGE-XR) 500 MG 24 hr tablet    Empagliflozin-linaGLIPtin (GLYXAMBI) 10-5 MG tablet    Other Relevant Orders    Comprehensive Metabolic Panel    Hemoglobin A1c       Genitourinary    Erectile dysfunction  Refill written on sildenafil  Reminded of the dangers of combining with nitrates    Relevant Medications    sildenafil (VIAGRA) 100 MG tablet       Other    Healthcare maintenance  Reminded to get a flu shot when available.    Ptosis of both eyelids    Hyponatremia  Will continue to monitor    Elevated liver enzymes  As above.    Elevated TSH  As above.

## 2019-08-14 VITALS
BODY MASS INDEX: 30.66 KG/M2 | WEIGHT: 207 LBS | TEMPERATURE: 98 F | OXYGEN SATURATION: 99 % | HEART RATE: 75 BPM | DIASTOLIC BLOOD PRESSURE: 80 MMHG | HEIGHT: 69 IN | RESPIRATION RATE: 12 BRPM | SYSTOLIC BLOOD PRESSURE: 155 MMHG

## 2019-09-19 ENCOUNTER — OFFICE VISIT (OUTPATIENT)
Dept: CARDIOLOGY | Facility: CLINIC | Age: 74
End: 2019-09-19

## 2019-09-19 VITALS
WEIGHT: 210.2 LBS | BODY MASS INDEX: 31.13 KG/M2 | DIASTOLIC BLOOD PRESSURE: 84 MMHG | HEIGHT: 69 IN | HEART RATE: 72 BPM | OXYGEN SATURATION: 96 % | SYSTOLIC BLOOD PRESSURE: 177 MMHG

## 2019-09-19 DIAGNOSIS — I48.0 PAROXYSMAL ATRIAL FIBRILLATION (HCC): Primary | ICD-10-CM

## 2019-09-19 DIAGNOSIS — E78.5 DYSLIPIDEMIA: ICD-10-CM

## 2019-09-19 DIAGNOSIS — I10 ESSENTIAL HYPERTENSION: ICD-10-CM

## 2019-09-19 DIAGNOSIS — R42 DIZZINESS: ICD-10-CM

## 2019-09-19 DIAGNOSIS — I25.10 CORONARY ARTERY DISEASE INVOLVING NATIVE CORONARY ARTERY OF NATIVE HEART WITHOUT ANGINA PECTORIS: ICD-10-CM

## 2019-09-19 PROCEDURE — 99214 OFFICE O/P EST MOD 30 MIN: CPT | Performed by: INTERNAL MEDICINE

## 2019-09-19 RX ORDER — PIOGLITAZONEHYDROCHLORIDE 15 MG/1
15 TABLET ORAL 2 TIMES DAILY
COMMUNITY
End: 2019-09-24

## 2019-09-19 NOTE — PROGRESS NOTES
[facility] CARDIOLOGY  [address]  [place] [address]  Phone: [phone]  Fax: [phone]    09/19/2019    Chief Complaint   Patient presents with   • Coronary Artery Disease        History:   Aaron Delgado is a 74 y.o. male presents today for a regular follow up.  He has a history of CHF, A-fib, and hypertension. He has complaints of dizziness when he is up walking. He has trouble with balance that leads him to frequent falls.  He has been treated for vertigo which meds didn't help.  He denies any dizziness laying down or sitting up. He also states that he has occasional episodes of HA with no know trigger.     The chart and medications were reviewed today.     Past Medical History:   Diagnosis Date   • A-fib (CMS/[facility])    • CHF (congestive heart failure) (CMS/[facility])    • Hypertension        Past Surgical History:   Procedure Laterality Date   • CORONARY ARTERY BYPASS GRAFT          Past Social History:  Social History     Socioeconomic History   • Marital status:      Spouse name: Not on file   • Number of children: Not on file   • Years of education: Not on file   • Highest education level: Not on file   Occupational History   • Occupation: Retired   Tobacco Use   • Smoking status: Never Smoker   • Smokeless tobacco: Never Used   Substance and Sexual Activity   • Alcohol use: No     Frequency: Never   • Drug use: No   • Sexual activity: Defer       Past Family History:  History reviewed. No pertinent family history.    Review of Systems:   Please see HPI  Constitution: No chills, no rigors, no unexplained weight loss or weight gain  Eyes:  No diplopia, no blurred vision, no loss of vision, conjunctiva is pink and sclera is anicteric  ENT:  No tinnitus, no otorrhea, no epistaxis, no sore throat   Respiratory: No cough, no hemoptysis  Cardiovascular: see HPI  Gastrointestinal: No nausea, no vomiting, no hematemesis, no diarrhea or constipation, no  melena  Genitourinary: No frequency of dysuria no hematuria  Integument: No pruritis and  no skin rash  Hematologic / Lymphatic: No excessive bleeding, easy bruising, fatigue, lymphadenopathy and petechiae  Musculoskeletal: No joint pain, joint stiffness, joint swelling, muscle pain, muscle weakness and neck pain  Neurological: No dizziness, headaches, light headedness, seizures and vertigo  Endocrine: No frequent urination and nocturia, temperature intolerance, weight gain, unintended and weight loss, unintended      Current Outpatient Medications   Medication Sig Dispense Refill   • amiodarone (PACERONE) 200 MG tablet Take 1 tablet by mouth Daily. 90 tablet 3   • apixaban (ELIQUIS) 5 MG tablet tablet Take 1 tablet by mouth Every 12 (Twelve) Hours. 180 tablet 3   • benazepril (LOTENSIN) 20 MG tablet Take 1 tablet by mouth Every Night. 30 tablet 5   • Empagliflozin-linaGLIPtin (GLYXAMBI) 10-5 MG tablet Take 1 tablet by mouth Every Morning. 42 tablet 0   • ezetimibe-simvastatin (VYTORIN) 10-20 MG per tablet Take 1 tablet by mouth Every Night. 90 tablet 3   • pioglitazone (ACTOS) 15 MG tablet Take 15 mg by mouth 2 (Two) Times a Day.     • propafenone (RYTHMOL) 225 MG tablet Take 1 tablet by mouth Every 8 (Eight) Hours. 270 tablet 3   • metFORMIN ER (GLUCOPHAGE-XR) 500 MG 24 hr tablet Take 4 tablets by mouth Daily With Breakfast. 360 tablet 3   • sildenafil (VIAGRA) 100 MG tablet Take 1 tablet by mouth Daily As Needed for erectile dysfunction. 30 tablet 0   • vitamin B-12 (CYANOCOBALAMIN) 1000 MCG tablet Take 1 tablet by mouth Daily. 30 tablet 5     Current Facility-Administered Medications   Medication Dose Route Frequency Provider Last Rate Last Dose   • cyanocobalamin injection 1,000 mcg  1,000 mcg Intramuscular Q28 Days Dillan Nur MD   1,000 mcg at 06/06/19 1359        No Known Allergies    Objective:  Vitals:    09/19/19 1356   BP: 177/84   Pulse: 72   SpO2: 96%     Comfortable NAD  PERRL,  conjunctiva clear  Neck supple, no JVD or thyromegaly appreciated  S1/S2 RRR, no m/r/g  Lungs CTA B, normal effort  Abdomen S/NT/ND (+) BS, no HSM appreciated  Extremities warm, no clubbing, cyanosis, or edema  Normal gait  No visible or palpable skin lesions  A/Ox4, mood and affect appropriate  Pulse exam:    Feet are warm bilateral  1+ edema bilateral  Capillary refill is normal  No evidence of ulceration or color change of the toes  PULSES  Right DP and PT are 1+ and Left DP and PT are 2+    DATA:             Procedures       Assessment:    Dizziness with HA and possibly vertigo  Frequent falls suggest neurologic etiology with abnormal gait pattern  I dont think his Dizziness is cardiogenic  CAD   Essential HTN  Paroxysmal atrial fib. Anticoagulated  Occasional HA    Plan    Continue current medication regimen.   Referral to Neurology for persistent dizziness with occasional HA  Echocardiogram  I will see him back in 3 months unless otherwise needed.     Patient's Body mass index is 31.04 kg/m². BMI is within normal parameters. No follow-up required..       No diagnosis found.     Thank you for allowing me to participate in the care of Aaron Delgado. Feel free to contact me directly with any further questions or concerns.        Director, Cardiac Cath Lab    JOSE Corona, acting as scribe for Ian Bishop MD, [facility].   09/19/19  2:49 PM    I have read and agree the documentation that has been completed regarding this visit.  By signing this record, I attest that the documentation was completed by my physical presence and is an accurate record of the encounter.  Voice recognition / transcription technology used for some documentation in this chart in attempt to mitigate substantial inefficiencies created by this electronic health record technology.  As a result, there may be some typos and.or nonsensical language introduced into the chart that either overlooked in editing/review and/or that I am unable  to correct as patient care needs require me to prioritize my attention to bedside patient care rather than electronic documentation. MA

## 2019-09-20 ENCOUNTER — LAB (OUTPATIENT)
Dept: FAMILY MEDICINE CLINIC | Facility: CLINIC | Age: 74
End: 2019-09-20

## 2019-09-20 DIAGNOSIS — E11.42 TYPE 2 DIABETES MELLITUS WITH PERIPHERAL NEUROPATHY (HCC): ICD-10-CM

## 2019-09-20 PROCEDURE — 36415 COLL VENOUS BLD VENIPUNCTURE: CPT | Performed by: GENERAL PRACTICE

## 2019-09-21 LAB
ALBUMIN SERPL-MCNC: 3.9 G/DL (ref 3.5–4.8)
ALBUMIN/GLOB SERPL: 1.4 {RATIO} (ref 1.2–2.2)
ALP SERPL-CCNC: 121 IU/L (ref 39–117)
ALT SERPL-CCNC: 45 IU/L (ref 0–44)
AST SERPL-CCNC: 63 IU/L (ref 0–40)
BILIRUB SERPL-MCNC: 0.4 MG/DL (ref 0–1.2)
BUN SERPL-MCNC: 17 MG/DL (ref 8–27)
BUN/CREAT SERPL: 10 (ref 10–24)
CALCIUM SERPL-MCNC: 10 MG/DL (ref 8.6–10.2)
CHLORIDE SERPL-SCNC: 100 MMOL/L (ref 96–106)
CO2 SERPL-SCNC: 22 MMOL/L (ref 20–29)
CREAT SERPL-MCNC: 1.64 MG/DL (ref 0.76–1.27)
GLOBULIN SER CALC-MCNC: 2.7 G/DL (ref 1.5–4.5)
GLUCOSE SERPL-MCNC: 117 MG/DL (ref 65–99)
HBA1C MFR BLD: 5.9 % (ref 4.8–5.6)
POTASSIUM SERPL-SCNC: 4.7 MMOL/L (ref 3.5–5.2)
PROT SERPL-MCNC: 6.6 G/DL (ref 6–8.5)
SODIUM SERPL-SCNC: 137 MMOL/L (ref 134–144)

## 2019-09-24 ENCOUNTER — OFFICE VISIT (OUTPATIENT)
Dept: FAMILY MEDICINE CLINIC | Facility: CLINIC | Age: 74
End: 2019-09-24

## 2019-09-24 DIAGNOSIS — R79.89 ELEVATED TSH: ICD-10-CM

## 2019-09-24 DIAGNOSIS — N52.01 ERECTILE DYSFUNCTION DUE TO ARTERIAL INSUFFICIENCY: ICD-10-CM

## 2019-09-24 DIAGNOSIS — R74.8 ELEVATED LIVER ENZYMES: ICD-10-CM

## 2019-09-24 DIAGNOSIS — E53.8 B12 DEFICIENCY: ICD-10-CM

## 2019-09-24 DIAGNOSIS — R42 VERTIGO: ICD-10-CM

## 2019-09-24 DIAGNOSIS — H02.403 PTOSIS OF BOTH EYELIDS: ICD-10-CM

## 2019-09-24 DIAGNOSIS — I25.10 CORONARY ARTERY DISEASE INVOLVING NATIVE CORONARY ARTERY OF NATIVE HEART WITHOUT ANGINA PECTORIS: ICD-10-CM

## 2019-09-24 DIAGNOSIS — L40.9 PSORIASIS: ICD-10-CM

## 2019-09-24 DIAGNOSIS — E78.2 MIXED HYPERLIPIDEMIA: ICD-10-CM

## 2019-09-24 DIAGNOSIS — I48.0 PAROXYSMAL ATRIAL FIBRILLATION (HCC): Primary | ICD-10-CM

## 2019-09-24 DIAGNOSIS — I10 ESSENTIAL HYPERTENSION: ICD-10-CM

## 2019-09-24 DIAGNOSIS — E11.42 TYPE 2 DIABETES MELLITUS WITH PERIPHERAL NEUROPATHY (HCC): ICD-10-CM

## 2019-09-24 DIAGNOSIS — Z23 ENCOUNTER FOR IMMUNIZATION: ICD-10-CM

## 2019-09-24 DIAGNOSIS — K21.9 GASTROESOPHAGEAL REFLUX DISEASE WITHOUT ESOPHAGITIS: ICD-10-CM

## 2019-09-24 DIAGNOSIS — Z00.00 HEALTHCARE MAINTENANCE: ICD-10-CM

## 2019-09-24 PROCEDURE — G0009 ADMIN PNEUMOCOCCAL VACCINE: HCPCS | Performed by: GENERAL PRACTICE

## 2019-09-24 PROCEDURE — 99214 OFFICE O/P EST MOD 30 MIN: CPT | Performed by: GENERAL PRACTICE

## 2019-09-24 PROCEDURE — 90670 PCV13 VACCINE IM: CPT | Performed by: GENERAL PRACTICE

## 2019-09-24 RX ORDER — ROSUVASTATIN CALCIUM 20 MG/1
20 TABLET, COATED ORAL NIGHTLY
Qty: 90 TABLET | Refills: 3 | Status: SHIPPED | OUTPATIENT
Start: 2019-09-24 | End: 2020-01-01

## 2019-09-24 RX ORDER — METOPROLOL SUCCINATE 50 MG/1
50 TABLET, EXTENDED RELEASE ORAL NIGHTLY
Qty: 90 TABLET | Refills: 3 | Status: SHIPPED | OUTPATIENT
Start: 2019-09-24 | End: 2020-01-01

## 2019-09-24 NOTE — PROGRESS NOTES
Subjective   Aaron Delgado is a 74 y.o. male.     History of Present Illness     Dizziness  He gives an 8-9 month history of intermittent dizziness.  This is described as a sense of movement that occurs with changes in position and resolves promptly with rest.  To date he has had no associated symptoms and specifically denies any hearing loss, tinnitus, or difficulty with his eye hand coordination.  MRI of the brain performed on 2/18/2019 was unremarkable.  He was apparently taught vestibular exercises through physical therapy    Atrial Fibrillation  Patient has a history of atrial fibrillation. The patient denies palpitations, lightheadedness, diaphoresis or syncope. The patient has a past history of CAD, DM, HTN and hyperlipidemia.  He remains on apixaban. He denies  bleeding.  He continues to be followed by cardiology    Coronary Artery Disease  S/P CABG.  He has noted a persistent improvement in his lower extremity edema since last here.  He continues to deny any chest pain or palpitations and there is no history of any lightheadedness, or shortness of breath.     Type 2 Diabetes Mellitus  He admits to mild numbness and tingling of both feet.  There is no history of any polyuria, polydipsia, skin breakdown, or hypoglycemia.  He is currently on metformin, pioglitazone, and empagliflozin. His last diabetic eye exam was within 1 year.   Lab Results   Component Value Date    HGBA1C 5.9 (H) 09/20/2019     Hyperlipidemia  He remains on simvastatin, and ezetimibewith no apparent side effects.    Hypertension  He remains on benazepril.  Chlorthalidone was discontinued due to hyponatremia  BUN   Date Value Ref Range Status   09/20/2019 17 8 - 27 mg/dL Final     Creatinine   Date Value Ref Range Status   09/20/2019 1.64 (H) 0.76 - 1.27 mg/dL Final     Sodium   Date Value Ref Range Status   09/20/2019 137 134 - 144 mmol/L Final     Potassium   Date Value Ref Range Status   09/20/2019 4.7 3.5 - 5.2 mmol/L Final      Comment:     Specimen received in contact with cells. No visible hemolysis  present. However GLUC may be decreased and K increased. Clinical  correlation indicated.       Chloride   Date Value Ref Range Status   09/20/2019 100 96 - 106 mmol/L Final     Total CO2   Date Value Ref Range Status   09/20/2019 22 20 - 29 mmol/L Final     Calcium   Date Value Ref Range Status   09/20/2019 10.0 8.6 - 10.2 mg/dL Final     Albumin   Date Value Ref Range Status   09/20/2019 3.9 3.5 - 4.8 g/dL Final     ALT (SGPT)   Date Value Ref Range Status   09/20/2019 45 (H) 0 - 44 IU/L Final     AST (SGOT)   Date Value Ref Range Status   09/20/2019 63 (H) 0 - 40 IU/L Final     Alkaline Phosphatase   Date Value Ref Range Status   09/20/2019 121 (H) 39 - 117 IU/L Final     Total Bilirubin   Date Value Ref Range Status   09/20/2019 0.4 0.0 - 1.2 mg/dL Final     eGFR Non  Am   Date Value Ref Range Status   09/20/2019 41 (L) >59 mL/min/1.73 Final     A/G Ratio   Date Value Ref Range Status   09/20/2019 1.4 1.2 - 2.2 Final     BUN/Creatinine Ratio   Date Value Ref Range Status   09/20/2019 10 10 - 24 Final     The following portions of the patient's history were reviewed and updated as appropriate: allergies, current medications, past medical history, past social history and problem list.    Review of Systems   Constitutional: Negative for appetite change, chills, fatigue, fever and unexpected weight change.   HENT: Negative for congestion, ear pain, rhinorrhea, sneezing, sore throat and voice change.    Eyes: Negative for visual disturbance.   Respiratory: Negative for cough, shortness of breath and wheezing.    Cardiovascular: Positive for leg swelling. Negative for chest pain and palpitations.   Gastrointestinal: Negative for abdominal pain, blood in stool, constipation, diarrhea, nausea and vomiting.   Endocrine: Negative for polydipsia and polyuria.   Genitourinary: Negative for difficulty urinating, dysuria, frequency, hematuria  and urgency.   Musculoskeletal: Positive for arthralgias. Negative for back pain, joint swelling, myalgias and neck pain.   Skin: Negative for color change.   Neurological: Positive for dizziness. Negative for tremors, weakness, numbness and headaches.   Psychiatric/Behavioral: Negative for dysphoric mood, sleep disturbance and suicidal ideas. The patient is not nervous/anxious.      Objective   Physical Exam   Constitutional: He is oriented to person, place, and time. He appears well-developed and well-nourished. He is cooperative. He does not have a sickly appearance. No distress.   Bright and in good spirits. No apparent distress. No pallor, jaundice, diaphoresis, or cyanosis.   HENT:   Head: Atraumatic.   Right Ear: Tympanic membrane, external ear and ear canal normal.   Left Ear: Tympanic membrane, external ear and ear canal normal.   Mouth/Throat: Oropharynx is clear and moist.   Eyes: EOM are normal. Pupils are equal, round, and reactive to light. No scleral icterus.   Neck: No JVD present. Carotid bruit is not present. No tracheal deviation present. No thyromegaly present.   Cardiovascular: Normal rate, regular rhythm, S1 normal, S2 normal, normal heart sounds and intact distal pulses. Exam reveals no gallop.   No murmur heard.  Pulmonary/Chest: Breath sounds normal. He has no wheezes. He has no rales.   Abdominal: Soft. Normal aorta and bowel sounds are normal. He exhibits no abdominal bruit and no mass. There is no hepatosplenomegaly. There is no tenderness. No hernia.   Musculoskeletal: He exhibits no deformity.   No peripheral joint redness or warmth.     Vascular Status -  His right foot exhibits no edema (compression hose). His left foot exhibits no edema (compression hose).  Lymphadenopathy:        Head (right side): No submandibular adenopathy present.        Head (left side): No submandibular adenopathy present.     He has no cervical adenopathy.   Neurological: He is alert and oriented to person,  place, and time. He has normal strength and normal reflexes. He displays normal reflexes. No cranial nerve deficit. He exhibits normal muscle tone. Coordination normal.   Skin: Skin is warm and dry. No rash noted. He is not diaphoretic. No pallor. Nails show no clubbing.   Psychiatric: He has a normal mood and affect. His behavior is normal.     Assessment/Plan   Problems Addressed this Visit        Cardiovascular and Mediastinum    CAD (coronary artery disease)  Reminded regarding the importance of risk factor modification.  Continue current medication  Follow up with cardiology     Relevant Medications    metoprolol succinate XL (TOPROL-XL) 50 MG 24 hr tablet    Mixed hyperlipidemia  Encouraged to continue to work on his diet and exercise plan.  Continue current medication    Relevant Medications    rosuvastatin (CRESTOR) 20 MG tablet    Essential hypertension   Hypertension: elevated today. Evidence of target organ damage: coronary artery disease and chronic kidney disease.   As above  Will resume metoprolol    Relevant Medications    metoprolol succinate XL (TOPROL-XL) 50 MG 24 hr tablet    Paroxysmal atrial fibrillation (CMS/HCC)    Rate control is appropriate.. Patient is anticoagulated.   Avoid caffeine.  Avoid oral decongestants.  As above.    Relevant Medications    metoprolol succinate XL (TOPROL-XL) 50 MG 24 hr tablet       Digestive    Gastroesophageal reflux disease without esophagitis    B12 deficiency  Continue supplementation with monitoring.       Endocrine    Type 2 diabetes mellitus with peripheral neuropathy (CMS/HCC)  Diabetes mellitus Type II, under excellent control.   Encouraged to continue to pursue ADA diet  Encouraged aerobic exercise.  Continue current medication  Updated labs will be drawn at his return.    Relevant Medications    Empagliflozin-linaGLIPtin (GLYXAMBI) 10-5 MG tablet       Musculoskeletal and Integument    Psoriasis       Genitourinary    Erectile dysfunction       Other     Healthcare maintenance  Recommended a prevnar 13  Patient will obtain a flu shot through his pharmacy  Will discuss hepatitis A #2 along with shingrix at his return     Relevant Orders    Pneumococcal Conjugate Vaccine 13-Valent All (Completed)    Ptosis of both eyelids  Follow up with oculoplastic surgery    Elevated liver enzymes  Will continue to monitor    Vertigo  Will arrange an ENT assessment a little more rather than    Relevant Orders    Ambulatory Referral to ENT (Otolaryngology)    Elevated TSH  Will continue to monitor

## 2019-09-26 VITALS
TEMPERATURE: 98.7 F | RESPIRATION RATE: 12 BRPM | HEART RATE: 73 BPM | WEIGHT: 208 LBS | BODY MASS INDEX: 30.81 KG/M2 | DIASTOLIC BLOOD PRESSURE: 80 MMHG | OXYGEN SATURATION: 95 % | HEIGHT: 69 IN | SYSTOLIC BLOOD PRESSURE: 160 MMHG

## 2019-09-26 PROBLEM — E87.1 HYPONATREMIA: Status: RESOLVED | Noted: 2019-06-08 | Resolved: 2019-09-26

## 2019-10-01 ENCOUNTER — HOSPITAL ENCOUNTER (OUTPATIENT)
Dept: CARDIOLOGY | Facility: HOSPITAL | Age: 74
Discharge: HOME | End: 2019-10-01
Admitting: INTERNAL MEDICINE

## 2019-10-01 DIAGNOSIS — I48.0 PAROXYSMAL ATRIAL FIBRILLATION (HCC): ICD-10-CM

## 2019-10-01 PROCEDURE — 93306 TTE W/DOPPLER COMPLETE: CPT | Performed by: INTERNAL MEDICINE

## 2019-10-01 PROCEDURE — 93306 TTE W/DOPPLER COMPLETE: CPT

## 2019-10-04 LAB
BH CV ECHO MEAS - % IVS THICK: -5.4 %
BH CV ECHO MEAS - % LVPW THICK: -6.7 %
BH CV ECHO MEAS - ACS: 2 CM
BH CV ECHO MEAS - AO MAX PG (FULL): 3.9 MMHG
BH CV ECHO MEAS - AO MAX PG: 6.8 MMHG
BH CV ECHO MEAS - AO MEAN PG (FULL): 1.8 MMHG
BH CV ECHO MEAS - AO MEAN PG: 2.9 MMHG
BH CV ECHO MEAS - AO ROOT AREA (BSA CORRECTED): 1.9
BH CV ECHO MEAS - AO ROOT AREA: 12.3 CM^2
BH CV ECHO MEAS - AO ROOT DIAM: 4 CM
BH CV ECHO MEAS - AO V2 MAX: 130.3 CM/SEC
BH CV ECHO MEAS - AO V2 MEAN: 76.3 CM/SEC
BH CV ECHO MEAS - AO V2 VTI: 27.7 CM
BH CV ECHO MEAS - AVA(I,A): 3.8 CM^2
BH CV ECHO MEAS - AVA(I,D): 3.8 CM^2
BH CV ECHO MEAS - AVA(V,A): 3.2 CM^2
BH CV ECHO MEAS - AVA(V,D): 3.2 CM^2
BH CV ECHO MEAS - BSA(HAYCOCK): 2.2 M^2
BH CV ECHO MEAS - BSA: 2.1 M^2
BH CV ECHO MEAS - BZI_BMI: 30.7 KILOGRAMS/M^2
BH CV ECHO MEAS - BZI_METRIC_HEIGHT: 175.3 CM
BH CV ECHO MEAS - BZI_METRIC_WEIGHT: 94.3 KG
BH CV ECHO MEAS - EDV(CUBED): 137.4 ML
BH CV ECHO MEAS - EDV(MOD-SP4): 111 ML
BH CV ECHO MEAS - EDV(TEICH): 127.2 ML
BH CV ECHO MEAS - EF(CUBED): 68.9 %
BH CV ECHO MEAS - EF(MOD-SP4): 65.8 %
BH CV ECHO MEAS - EF(TEICH): 60.2 %
BH CV ECHO MEAS - ESV(CUBED): 42.7 ML
BH CV ECHO MEAS - ESV(MOD-SP4): 38 ML
BH CV ECHO MEAS - ESV(TEICH): 50.7 ML
BH CV ECHO MEAS - FS: 32.3 %
BH CV ECHO MEAS - IVS/LVPW: 1
BH CV ECHO MEAS - IVSD: 1.3 CM
BH CV ECHO MEAS - IVSS: 1.2 CM
BH CV ECHO MEAS - LA DIMENSION: 4.7 CM
BH CV ECHO MEAS - LA/AO: 1.2
BH CV ECHO MEAS - LV DIASTOLIC VOL/BSA (35-75): 52.8 ML/M^2
BH CV ECHO MEAS - LV MASS(C)D: 266.2 GRAMS
BH CV ECHO MEAS - LV MASS(C)DI: 126.8 GRAMS/M^2
BH CV ECHO MEAS - LV MASS(C)S: 134.4 GRAMS
BH CV ECHO MEAS - LV MASS(C)SI: 64 GRAMS/M^2
BH CV ECHO MEAS - LV MAX PG: 2.9 MMHG
BH CV ECHO MEAS - LV MEAN PG: 1.1 MMHG
BH CV ECHO MEAS - LV SYSTOLIC VOL/BSA (12-30): 18.1 ML/M^2
BH CV ECHO MEAS - LV V1 MAX: 85.4 CM/SEC
BH CV ECHO MEAS - LV V1 MEAN: 45.9 CM/SEC
BH CV ECHO MEAS - LV V1 VTI: 21.3 CM
BH CV ECHO MEAS - LVIDD: 5.2 CM
BH CV ECHO MEAS - LVIDS: 3.5 CM
BH CV ECHO MEAS - LVLD AP4: 7.3 CM
BH CV ECHO MEAS - LVLS AP4: 5.3 CM
BH CV ECHO MEAS - LVOT AREA (M): 4.9 CM^2
BH CV ECHO MEAS - LVOT AREA: 4.9 CM^2
BH CV ECHO MEAS - LVOT DIAM: 2.5 CM
BH CV ECHO MEAS - LVPWD: 1.3 CM
BH CV ECHO MEAS - LVPWS: 1.2 CM
BH CV ECHO MEAS - MV A MAX VEL: 90.3 CM/SEC
BH CV ECHO MEAS - MV E MAX VEL: 63.7 CM/SEC
BH CV ECHO MEAS - MV E/A: 0.7
BH CV ECHO MEAS - MV MAX PG: 4.7 MMHG
BH CV ECHO MEAS - MV MEAN PG: 1.8 MMHG
BH CV ECHO MEAS - MV V2 MAX: 108.6 CM/SEC
BH CV ECHO MEAS - MV V2 MEAN: 64 CM/SEC
BH CV ECHO MEAS - MV V2 VTI: 38.7 CM
BH CV ECHO MEAS - MVA(VTI): 2.7 CM^2
BH CV ECHO MEAS - PA ACC SLOPE: 1450 CM/SEC^2
BH CV ECHO MEAS - PA ACC TIME: 0.07 SEC
BH CV ECHO MEAS - PA PR(ACCEL): 45.7 MMHG
BH CV ECHO MEAS - SI(AO): 161.9 ML/M^2
BH CV ECHO MEAS - SI(CUBED): 45.1 ML/M^2
BH CV ECHO MEAS - SI(LVOT): 49.8 ML/M^2
BH CV ECHO MEAS - SI(MOD-SP4): 34.8 ML/M^2
BH CV ECHO MEAS - SI(TEICH): 36.4 ML/M^2
BH CV ECHO MEAS - SV(AO): 340 ML
BH CV ECHO MEAS - SV(CUBED): 94.7 ML
BH CV ECHO MEAS - SV(LVOT): 104.7 ML
BH CV ECHO MEAS - SV(MOD-SP4): 73 ML
BH CV ECHO MEAS - SV(TEICH): 76.5 ML
MAXIMAL PREDICTED HEART RATE: 147 BPM
STRESS TARGET HR: 125 BPM

## 2019-10-29 ENCOUNTER — OFFICE VISIT (OUTPATIENT)
Dept: FAMILY MEDICINE CLINIC | Facility: CLINIC | Age: 74
End: 2019-10-29

## 2019-10-29 DIAGNOSIS — E11.42 TYPE 2 DIABETES MELLITUS WITH PERIPHERAL NEUROPATHY (HCC): ICD-10-CM

## 2019-10-29 DIAGNOSIS — I10 ESSENTIAL HYPERTENSION: ICD-10-CM

## 2019-10-29 DIAGNOSIS — R74.8 ELEVATED LIVER ENZYMES: ICD-10-CM

## 2019-10-29 DIAGNOSIS — R42 VERTIGO: ICD-10-CM

## 2019-10-29 DIAGNOSIS — R79.89 ELEVATED TSH: ICD-10-CM

## 2019-10-29 DIAGNOSIS — Z00.00 HEALTHCARE MAINTENANCE: ICD-10-CM

## 2019-10-29 DIAGNOSIS — I48.0 PAROXYSMAL ATRIAL FIBRILLATION (HCC): Primary | ICD-10-CM

## 2019-10-29 DIAGNOSIS — I25.10 CORONARY ARTERY DISEASE INVOLVING NATIVE CORONARY ARTERY OF NATIVE HEART WITHOUT ANGINA PECTORIS: ICD-10-CM

## 2019-10-29 DIAGNOSIS — E53.8 B12 DEFICIENCY: ICD-10-CM

## 2019-10-29 DIAGNOSIS — H02.403 PTOSIS OF BOTH EYELIDS: ICD-10-CM

## 2019-10-29 DIAGNOSIS — E78.2 MIXED HYPERLIPIDEMIA: ICD-10-CM

## 2019-10-29 PROCEDURE — 99214 OFFICE O/P EST MOD 30 MIN: CPT | Performed by: GENERAL PRACTICE

## 2019-10-29 RX ORDER — BENAZEPRIL HYDROCHLORIDE 20 MG/1
20 TABLET ORAL 2 TIMES DAILY
Qty: 60 TABLET | Refills: 5 | Status: SHIPPED | OUTPATIENT
Start: 2019-10-29 | End: 2019-12-13 | Stop reason: SDUPTHER

## 2019-10-29 NOTE — PROGRESS NOTES
Subjective   Aaron Delgado is a 74 y.o. male.     History of Present Illness     Dizziness  He gives an 9-10 month history of intermittent dizziness.  This is described as a sense of movement that occurs with changes in position and resolves promptly with rest.  To date he has had no associated symptoms and specifically denies any hearing loss, tinnitus, or difficulty with his eye hand coordination.  MRI of the brain performed on 2/18/2019 was unremarkable.  He has been scheduled to undergo an ENT assessment on 11/11/2019    Atrial Fibrillation  Patient has a history of atrial fibrillation. The patient denies palpitations, lightheadedness, diaphoresis or syncope. The patient has a past history of CAD, DM, HTN and hyperlipidemia.  He remains on apixaban. He denies  bleeding.  He continues to be followed by cardiology and will undergo a reassessment on 12/17/2019    Coronary Artery Disease  S/P CABG.  He has noted a persistent improvement in his lower extremity edema since last here.  He continues to deny any chest pain or palpitations and there is no history of any lightheadedness, or shortness of breath.     Type 2 Diabetes Mellitus  He admits to mild numbness and tingling of both feet.  There is no history of any polyuria, polydipsia, skin breakdown, or hypoglycemia.  He is currently on metformin, pioglitazone, and empagliflozin. His last diabetic eye exam was within 1 year.  He underwent a bilateral blepharoplasty 2 weeks ago and is pleased with the results.  He will undergo an oculoplastic surgery reassessment on 11/7/2019    Hyperlipidemia  He remains on simvastatin, and ezetimibewith no apparent side effects.    Hypertension  He remains on benazepril and metoprolol.  Chlorthalidone was discontinued due to hyponatremia    The following portions of the patient's history were reviewed and updated as appropriate: allergies, current medications, past medical history, past social history and problem list.    Review  of Systems   Constitutional: Negative for appetite change, chills, fatigue, fever and unexpected weight change.   HENT: Negative for congestion, ear pain, rhinorrhea, sneezing, sore throat and voice change.    Eyes: Negative for visual disturbance.   Respiratory: Negative for cough, shortness of breath and wheezing.    Cardiovascular: Positive for leg swelling. Negative for chest pain and palpitations.   Gastrointestinal: Negative for abdominal pain, blood in stool, constipation, diarrhea, nausea and vomiting.   Endocrine: Negative for polydipsia and polyuria.   Genitourinary: Negative for difficulty urinating, dysuria, frequency, hematuria and urgency.   Musculoskeletal: Positive for arthralgias. Negative for back pain, joint swelling, myalgias and neck pain.   Skin: Negative for color change.   Neurological: Positive for dizziness. Negative for tremors, weakness, numbness and headaches.   Psychiatric/Behavioral: Negative for dysphoric mood, sleep disturbance and suicidal ideas. The patient is not nervous/anxious.      Objective   Physical Exam   Constitutional: He is oriented to person, place, and time. He appears well-developed and well-nourished. He is cooperative. He does not have a sickly appearance. No distress.   Bright and in good spirits. No apparent distress. No pallor, jaundice, diaphoresis, or cyanosis.   HENT:   Head: Atraumatic.   Right Ear: Tympanic membrane, external ear and ear canal normal.   Left Ear: Tympanic membrane, external ear and ear canal normal.   Mouth/Throat: Oropharynx is clear and moist.   Eyes: EOM are normal. Pupils are equal, round, and reactive to light. No scleral icterus.   Neck: No JVD present. Carotid bruit is not present. No tracheal deviation present. No thyromegaly present.   Cardiovascular: Normal rate, regular rhythm, S1 normal, S2 normal, normal heart sounds and intact distal pulses. Exam reveals no gallop.   No murmur heard.  Pulmonary/Chest: Breath sounds normal. He  has no wheezes. He has no rales.   Abdominal: Soft. Normal aorta and bowel sounds are normal. He exhibits no abdominal bruit and no mass. There is no hepatosplenomegaly. There is no tenderness. No hernia.   Musculoskeletal: He exhibits no deformity.   No peripheral joint redness or warmth.     Vascular Status -  His right foot exhibits no edema (compression hose). His left foot exhibits no edema (compression hose).  Lymphadenopathy:        Head (right side): No submandibular adenopathy present.        Head (left side): No submandibular adenopathy present.     He has no cervical adenopathy.   Neurological: He is alert and oriented to person, place, and time. He has normal strength and normal reflexes. He displays normal reflexes. No cranial nerve deficit. He exhibits normal muscle tone. Coordination normal.   Skin: Skin is warm and dry. No rash noted. He is not diaphoretic. No pallor. Nails show no clubbing.   Psychiatric: He has a normal mood and affect. His behavior is normal.     Assessment/Plan   Problems Addressed this Visit        Cardiovascular and Mediastinum    CAD (coronary artery disease)  Reminded regarding the importance of risk factor modification.  Continue current medication  Follow up with cardiology     Mixed hyperlipidemia  Encouraged to continue to work on his diet and exercise plan.  Continue current medication    Essential hypertension  As above.  Benazepril will be titrated    Relevant Medications    benazepril (LOTENSIN) 20 MG tablet    Paroxysmal atrial fibrillation (CMS/HCC)    Rate control is appropriate.. Patient is anticoagulated.   Avoid caffeine.  Avoid oral decongestants.  Continue current medication.   Follow up with cardiology.       Digestive    B12 deficiency  Continue supplementation with monitoring.       Endocrine    Type 2 diabetes mellitus with peripheral neuropathy (CMS/HCC)  Diabetes mellitus Type II, under excellent control.   Encouraged to continue to pursue ADA  diet  Encouraged aerobic exercise.  Continue current medication  Updated labs will be drawn at his return.       Other    Healthcare maintenance  Patient will obtain a flu shot through his pharmacy  We will discuss hepatitis A #2 at his return    Ptosis of both eyelids  S/P blepharoplast  Follow up with oculoplastic surgery        Vertigo  Follow up with ENT    Elevated TSH  Will continue to monitor

## 2019-10-30 VITALS
DIASTOLIC BLOOD PRESSURE: 80 MMHG | HEART RATE: 67 BPM | WEIGHT: 203 LBS | OXYGEN SATURATION: 96 % | SYSTOLIC BLOOD PRESSURE: 155 MMHG | HEIGHT: 69 IN | BODY MASS INDEX: 30.07 KG/M2 | TEMPERATURE: 98.9 F | RESPIRATION RATE: 12 BRPM

## 2019-11-27 ENCOUNTER — HOSPITAL ENCOUNTER (EMERGENCY)
Facility: HOSPITAL | Age: 74
Discharge: HOME | End: 2019-11-27
Attending: EMERGENCY MEDICINE | Admitting: EMERGENCY MEDICINE

## 2019-11-27 ENCOUNTER — APPOINTMENT (OUTPATIENT)
Dept: CT IMAGING | Facility: HOSPITAL | Age: 74
End: 2019-11-27

## 2019-11-27 ENCOUNTER — APPOINTMENT (OUTPATIENT)
Dept: GENERAL RADIOLOGY | Facility: HOSPITAL | Age: 74
End: 2019-11-27

## 2019-11-27 VITALS
SYSTOLIC BLOOD PRESSURE: 177 MMHG | DIASTOLIC BLOOD PRESSURE: 90 MMHG | TEMPERATURE: 98.2 F | WEIGHT: 211 LBS | HEIGHT: 69 IN | BODY MASS INDEX: 31.25 KG/M2 | HEART RATE: 54 BPM | OXYGEN SATURATION: 98 % | RESPIRATION RATE: 20 BRPM

## 2019-11-27 DIAGNOSIS — S00.03XA CONTUSION OF SCALP, INITIAL ENCOUNTER: ICD-10-CM

## 2019-11-27 DIAGNOSIS — R42 VERTIGO: Primary | ICD-10-CM

## 2019-11-27 LAB
ALBUMIN SERPL-MCNC: 3.96 G/DL (ref 3.5–5.2)
ALBUMIN/GLOB SERPL: 1.2 G/DL
ALP SERPL-CCNC: 192 U/L (ref 39–117)
ALT SERPL W P-5'-P-CCNC: 68 U/L (ref 1–41)
ANION GAP SERPL CALCULATED.3IONS-SCNC: 13.3 MMOL/L (ref 5–15)
AST SERPL-CCNC: 120 U/L (ref 1–40)
BASOPHILS # BLD AUTO: 0.05 10*3/MM3 (ref 0–0.2)
BASOPHILS NFR BLD AUTO: 0.8 % (ref 0–1.5)
BILIRUB SERPL-MCNC: 0.5 MG/DL (ref 0.2–1.2)
BILIRUB UR QL STRIP: NEGATIVE
BUN BLD-MCNC: 10 MG/DL (ref 8–23)
BUN/CREAT SERPL: 7 (ref 7–25)
CALCIUM SPEC-SCNC: 10 MG/DL (ref 8.6–10.5)
CHLORIDE SERPL-SCNC: 103 MMOL/L (ref 98–107)
CLARITY UR: CLEAR
CO2 SERPL-SCNC: 23.7 MMOL/L (ref 22–29)
COLOR UR: YELLOW
CREAT BLD-MCNC: 1.42 MG/DL (ref 0.76–1.27)
DEPRECATED RDW RBC AUTO: 48.2 FL (ref 37–54)
EOSINOPHIL # BLD AUTO: 0.21 10*3/MM3 (ref 0–0.4)
EOSINOPHIL NFR BLD AUTO: 3.3 % (ref 0.3–6.2)
ERYTHROCYTE [DISTWIDTH] IN BLOOD BY AUTOMATED COUNT: 14.3 % (ref 12.3–15.4)
GFR SERPL CREATININE-BSD FRML MDRD: 49 ML/MIN/1.73
GLOBULIN UR ELPH-MCNC: 3.3 GM/DL
GLUCOSE BLD-MCNC: 142 MG/DL (ref 65–99)
GLUCOSE UR STRIP-MCNC: ABNORMAL MG/DL
HCT VFR BLD AUTO: 45.4 % (ref 37.5–51)
HGB BLD-MCNC: 14.7 G/DL (ref 13–17.7)
HGB UR QL STRIP.AUTO: NEGATIVE
HOLD SPECIMEN: NORMAL
HOLD SPECIMEN: NORMAL
IMM GRANULOCYTES # BLD AUTO: 0.02 10*3/MM3 (ref 0–0.05)
IMM GRANULOCYTES NFR BLD AUTO: 0.3 % (ref 0–0.5)
KETONES UR QL STRIP: NEGATIVE
LEUKOCYTE ESTERASE UR QL STRIP.AUTO: NEGATIVE
LYMPHOCYTES # BLD AUTO: 1.08 10*3/MM3 (ref 0.7–3.1)
LYMPHOCYTES NFR BLD AUTO: 16.9 % (ref 19.6–45.3)
MAGNESIUM SERPL-MCNC: 1.9 MG/DL (ref 1.6–2.4)
MCH RBC QN AUTO: 29.4 PG (ref 26.6–33)
MCHC RBC AUTO-ENTMCNC: 32.4 G/DL (ref 31.5–35.7)
MCV RBC AUTO: 90.8 FL (ref 79–97)
MONOCYTES # BLD AUTO: 0.56 10*3/MM3 (ref 0.1–0.9)
MONOCYTES NFR BLD AUTO: 8.8 % (ref 5–12)
NEUTROPHILS # BLD AUTO: 4.47 10*3/MM3 (ref 1.7–7)
NEUTROPHILS NFR BLD AUTO: 69.9 % (ref 42.7–76)
NITRITE UR QL STRIP: NEGATIVE
NRBC BLD AUTO-RTO: 0 /100 WBC (ref 0–0.2)
NT-PROBNP SERPL-MCNC: 1082 PG/ML (ref 5–900)
PH UR STRIP.AUTO: <=5 [PH] (ref 5–8)
PLATELET # BLD AUTO: 208 10*3/MM3 (ref 140–450)
PMV BLD AUTO: 10.8 FL (ref 6–12)
POTASSIUM BLD-SCNC: 4 MMOL/L (ref 3.5–5.2)
PROT SERPL-MCNC: 7.3 G/DL (ref 6–8.5)
PROT UR QL STRIP: NEGATIVE
RBC # BLD AUTO: 5 10*6/MM3 (ref 4.14–5.8)
SODIUM BLD-SCNC: 140 MMOL/L (ref 136–145)
SP GR UR STRIP: 1.03 (ref 1–1.03)
TROPONIN T SERPL-MCNC: <0.01 NG/ML (ref 0–0.03)
TSH SERPL DL<=0.05 MIU/L-ACNC: 9.76 UIU/ML (ref 0.27–4.2)
UROBILINOGEN UR QL STRIP: ABNORMAL
WBC NRBC COR # BLD: 6.39 10*3/MM3 (ref 3.4–10.8)
WHOLE BLOOD HOLD SPECIMEN: NORMAL
WHOLE BLOOD HOLD SPECIMEN: NORMAL

## 2019-11-27 PROCEDURE — 83880 ASSAY OF NATRIURETIC PEPTIDE: CPT | Performed by: PHYSICIAN ASSISTANT

## 2019-11-27 PROCEDURE — 84484 ASSAY OF TROPONIN QUANT: CPT | Performed by: PHYSICIAN ASSISTANT

## 2019-11-27 PROCEDURE — 84443 ASSAY THYROID STIM HORMONE: CPT | Performed by: PHYSICIAN ASSISTANT

## 2019-11-27 PROCEDURE — 99284 EMERGENCY DEPT VISIT MOD MDM: CPT

## 2019-11-27 PROCEDURE — 71045 X-RAY EXAM CHEST 1 VIEW: CPT

## 2019-11-27 PROCEDURE — 81003 URINALYSIS AUTO W/O SCOPE: CPT | Performed by: PHYSICIAN ASSISTANT

## 2019-11-27 PROCEDURE — 71045 X-RAY EXAM CHEST 1 VIEW: CPT | Performed by: RADIOLOGY

## 2019-11-27 PROCEDURE — 93005 ELECTROCARDIOGRAM TRACING: CPT | Performed by: PHYSICIAN ASSISTANT

## 2019-11-27 PROCEDURE — 70450 CT HEAD/BRAIN W/O DYE: CPT

## 2019-11-27 PROCEDURE — 85025 COMPLETE CBC W/AUTO DIFF WBC: CPT | Performed by: PHYSICIAN ASSISTANT

## 2019-11-27 PROCEDURE — 83735 ASSAY OF MAGNESIUM: CPT | Performed by: PHYSICIAN ASSISTANT

## 2019-11-27 PROCEDURE — 80053 COMPREHEN METABOLIC PANEL: CPT | Performed by: PHYSICIAN ASSISTANT

## 2019-11-27 PROCEDURE — 93010 ELECTROCARDIOGRAM REPORT: CPT | Performed by: INTERNAL MEDICINE

## 2019-11-27 PROCEDURE — 70450 CT HEAD/BRAIN W/O DYE: CPT | Performed by: RADIOLOGY

## 2019-11-27 RX ORDER — SODIUM CHLORIDE 0.9 % (FLUSH) 0.9 %
10 SYRINGE (ML) INJECTION AS NEEDED
Status: DISCONTINUED | OUTPATIENT
Start: 2019-11-27 | End: 2019-11-27 | Stop reason: HOSPADM

## 2019-11-27 RX ORDER — LIDOCAINE HYDROCHLORIDE 20 MG/ML
10 INJECTION, SOLUTION INFILTRATION; PERINEURAL ONCE
Status: DISCONTINUED | OUTPATIENT
Start: 2019-11-27 | End: 2019-11-27 | Stop reason: HOSPADM

## 2019-12-03 ENCOUNTER — TELEPHONE (OUTPATIENT)
Dept: CARDIOLOGY | Facility: CLINIC | Age: 74
End: 2019-12-03

## 2019-12-03 NOTE — TELEPHONE ENCOUNTER
----- Message from Haritha Milligan MA sent at 10/7/2019  8:50 AM EDT -----  Echo shows a little thickening in the mitral valve that comes with age per Dr. Bishop

## 2019-12-03 NOTE — TELEPHONE ENCOUNTER
I called patient to discuss his echo results but was unable to reach him. I did leave a message for him to call back.

## 2019-12-10 ENCOUNTER — HOSPITAL ENCOUNTER (EMERGENCY)
Facility: HOSPITAL | Age: 74
Discharge: HOME | End: 2019-12-10
Attending: EMERGENCY MEDICINE | Admitting: EMERGENCY MEDICINE

## 2019-12-10 ENCOUNTER — APPOINTMENT (OUTPATIENT)
Dept: CT IMAGING | Facility: HOSPITAL | Age: 74
End: 2019-12-10

## 2019-12-10 VITALS
HEIGHT: 69 IN | TEMPERATURE: 98.1 F | OXYGEN SATURATION: 98 % | SYSTOLIC BLOOD PRESSURE: 177 MMHG | HEART RATE: 53 BPM | WEIGHT: 211 LBS | BODY MASS INDEX: 31.25 KG/M2 | DIASTOLIC BLOOD PRESSURE: 86 MMHG | RESPIRATION RATE: 18 BRPM

## 2019-12-10 DIAGNOSIS — R42 VERTIGO: Primary | ICD-10-CM

## 2019-12-10 LAB
ALBUMIN SERPL-MCNC: 3.93 G/DL (ref 3.5–5.2)
ALBUMIN/GLOB SERPL: 1.1 G/DL
ALP SERPL-CCNC: 205 U/L (ref 39–117)
ALT SERPL W P-5'-P-CCNC: 70 U/L (ref 1–41)
ANION GAP SERPL CALCULATED.3IONS-SCNC: 14.4 MMOL/L (ref 5–15)
APTT PPP: 39 SECONDS (ref 23.8–36.1)
AST SERPL-CCNC: 106 U/L (ref 1–40)
BASOPHILS # BLD AUTO: 0.03 10*3/MM3 (ref 0–0.2)
BASOPHILS NFR BLD AUTO: 0.4 % (ref 0–1.5)
BILIRUB SERPL-MCNC: 0.6 MG/DL (ref 0.2–1.2)
BILIRUB UR QL STRIP: NEGATIVE
BUN BLD-MCNC: 11 MG/DL (ref 8–23)
BUN/CREAT SERPL: 9 (ref 7–25)
CALCIUM SPEC-SCNC: 9.8 MG/DL (ref 8.6–10.5)
CHLORIDE SERPL-SCNC: 104 MMOL/L (ref 98–107)
CLARITY UR: CLEAR
CO2 SERPL-SCNC: 22.6 MMOL/L (ref 22–29)
COLOR UR: YELLOW
CREAT BLD-MCNC: 1.22 MG/DL (ref 0.76–1.27)
DEPRECATED RDW RBC AUTO: 50 FL (ref 37–54)
EOSINOPHIL # BLD AUTO: 0.18 10*3/MM3 (ref 0–0.4)
EOSINOPHIL NFR BLD AUTO: 2.2 % (ref 0.3–6.2)
ERYTHROCYTE [DISTWIDTH] IN BLOOD BY AUTOMATED COUNT: 15 % (ref 12.3–15.4)
GFR SERPL CREATININE-BSD FRML MDRD: 58 ML/MIN/1.73
GLOBULIN UR ELPH-MCNC: 3.7 GM/DL
GLUCOSE BLD-MCNC: 156 MG/DL (ref 65–99)
GLUCOSE UR STRIP-MCNC: ABNORMAL MG/DL
HCT VFR BLD AUTO: 46.7 % (ref 37.5–51)
HGB BLD-MCNC: 14.7 G/DL (ref 13–17.7)
HGB UR QL STRIP.AUTO: NEGATIVE
IMM GRANULOCYTES # BLD AUTO: 0.03 10*3/MM3 (ref 0–0.05)
IMM GRANULOCYTES NFR BLD AUTO: 0.4 % (ref 0–0.5)
INR PPP: 1.21 (ref 0.9–1.1)
KETONES UR QL STRIP: NEGATIVE
LEUKOCYTE ESTERASE UR QL STRIP.AUTO: NEGATIVE
LYMPHOCYTES # BLD AUTO: 1.08 10*3/MM3 (ref 0.7–3.1)
LYMPHOCYTES NFR BLD AUTO: 13.3 % (ref 19.6–45.3)
MAGNESIUM SERPL-MCNC: 2.1 MG/DL (ref 1.6–2.4)
MCH RBC QN AUTO: 28.5 PG (ref 26.6–33)
MCHC RBC AUTO-ENTMCNC: 31.5 G/DL (ref 31.5–35.7)
MCV RBC AUTO: 90.7 FL (ref 79–97)
MONOCYTES # BLD AUTO: 0.69 10*3/MM3 (ref 0.1–0.9)
MONOCYTES NFR BLD AUTO: 8.5 % (ref 5–12)
NEUTROPHILS # BLD AUTO: 6.11 10*3/MM3 (ref 1.7–7)
NEUTROPHILS NFR BLD AUTO: 75.2 % (ref 42.7–76)
NITRITE UR QL STRIP: NEGATIVE
NRBC BLD AUTO-RTO: 0 /100 WBC (ref 0–0.2)
PH UR STRIP.AUTO: 5.5 [PH] (ref 5–8)
PHOSPHATE SERPL-MCNC: 2.7 MG/DL (ref 2.5–4.5)
PLATELET # BLD AUTO: 234 10*3/MM3 (ref 140–450)
PMV BLD AUTO: 10.7 FL (ref 6–12)
POTASSIUM BLD-SCNC: 4.1 MMOL/L (ref 3.5–5.2)
PROT SERPL-MCNC: 7.6 G/DL (ref 6–8.5)
PROT UR QL STRIP: ABNORMAL
PROTHROMBIN TIME: 15.9 SECONDS (ref 11–15.4)
RBC # BLD AUTO: 5.15 10*6/MM3 (ref 4.14–5.8)
SODIUM BLD-SCNC: 141 MMOL/L (ref 136–145)
SP GR UR STRIP: >=1.03 (ref 1–1.03)
T4 FREE SERPL-MCNC: 1.63 NG/DL (ref 0.93–1.7)
TROPONIN T SERPL-MCNC: <0.01 NG/ML (ref 0–0.03)
TSH SERPL DL<=0.05 MIU/L-ACNC: 8.27 UIU/ML (ref 0.27–4.2)
UROBILINOGEN UR QL STRIP: ABNORMAL
WBC NRBC COR # BLD: 8.12 10*3/MM3 (ref 3.4–10.8)

## 2019-12-10 PROCEDURE — 83735 ASSAY OF MAGNESIUM: CPT | Performed by: EMERGENCY MEDICINE

## 2019-12-10 PROCEDURE — 81003 URINALYSIS AUTO W/O SCOPE: CPT | Performed by: EMERGENCY MEDICINE

## 2019-12-10 PROCEDURE — 25010000002 ONDANSETRON PER 1 MG: Performed by: EMERGENCY MEDICINE

## 2019-12-10 PROCEDURE — 84439 ASSAY OF FREE THYROXINE: CPT | Performed by: EMERGENCY MEDICINE

## 2019-12-10 PROCEDURE — 96374 THER/PROPH/DIAG INJ IV PUSH: CPT

## 2019-12-10 PROCEDURE — 25010000002 LORAZEPAM PER 2 MG: Performed by: EMERGENCY MEDICINE

## 2019-12-10 PROCEDURE — 85730 THROMBOPLASTIN TIME PARTIAL: CPT | Performed by: EMERGENCY MEDICINE

## 2019-12-10 PROCEDURE — 84484 ASSAY OF TROPONIN QUANT: CPT | Performed by: EMERGENCY MEDICINE

## 2019-12-10 PROCEDURE — 84443 ASSAY THYROID STIM HORMONE: CPT | Performed by: EMERGENCY MEDICINE

## 2019-12-10 PROCEDURE — 84100 ASSAY OF PHOSPHORUS: CPT | Performed by: EMERGENCY MEDICINE

## 2019-12-10 PROCEDURE — 99284 EMERGENCY DEPT VISIT MOD MDM: CPT

## 2019-12-10 PROCEDURE — 85025 COMPLETE CBC W/AUTO DIFF WBC: CPT | Performed by: EMERGENCY MEDICINE

## 2019-12-10 PROCEDURE — 80053 COMPREHEN METABOLIC PANEL: CPT | Performed by: EMERGENCY MEDICINE

## 2019-12-10 PROCEDURE — 93005 ELECTROCARDIOGRAM TRACING: CPT | Performed by: EMERGENCY MEDICINE

## 2019-12-10 PROCEDURE — 70450 CT HEAD/BRAIN W/O DYE: CPT

## 2019-12-10 PROCEDURE — 93010 ELECTROCARDIOGRAM REPORT: CPT | Performed by: INTERNAL MEDICINE

## 2019-12-10 PROCEDURE — 96375 TX/PRO/DX INJ NEW DRUG ADDON: CPT

## 2019-12-10 PROCEDURE — 85610 PROTHROMBIN TIME: CPT | Performed by: EMERGENCY MEDICINE

## 2019-12-10 RX ORDER — LORAZEPAM 2 MG/ML
0.5 INJECTION INTRAMUSCULAR ONCE
Status: COMPLETED | OUTPATIENT
Start: 2019-12-10 | End: 2019-12-10

## 2019-12-10 RX ORDER — LORAZEPAM 0.5 MG/1
0.5 TABLET ORAL EVERY 8 HOURS PRN
Qty: 10 TABLET | Refills: 0 | Status: SHIPPED | OUTPATIENT
Start: 2019-12-10 | End: 2021-01-01

## 2019-12-10 RX ORDER — SODIUM CHLORIDE 0.9 % (FLUSH) 0.9 %
10 SYRINGE (ML) INJECTION AS NEEDED
Status: DISCONTINUED | OUTPATIENT
Start: 2019-12-10 | End: 2019-12-11 | Stop reason: HOSPADM

## 2019-12-10 RX ORDER — ONDANSETRON 2 MG/ML
4 INJECTION INTRAMUSCULAR; INTRAVENOUS ONCE
Status: COMPLETED | OUTPATIENT
Start: 2019-12-10 | End: 2019-12-10

## 2019-12-10 RX ORDER — ONDANSETRON 4 MG/1
4 TABLET, FILM COATED ORAL EVERY 8 HOURS PRN
Qty: 30 TABLET | Refills: 0 | Status: SHIPPED | OUTPATIENT
Start: 2019-12-10 | End: 2021-01-01

## 2019-12-10 RX ADMIN — SODIUM CHLORIDE 1000 ML: 9 INJECTION, SOLUTION INTRAVENOUS at 21:24

## 2019-12-10 RX ADMIN — ONDANSETRON 4 MG: 2 INJECTION, SOLUTION INTRAMUSCULAR; INTRAVENOUS at 21:24

## 2019-12-10 RX ADMIN — LORAZEPAM 0.5 MG: 2 INJECTION INTRAMUSCULAR; INTRAVENOUS at 21:24

## 2019-12-10 NOTE — ED NOTES
Patient resting in lobby. Patient and family updated regarding wait times, verbalizes understanding. Awaiting room assignment. Instructed to alert staff of any needs. Verbalizes understanding. Will continue to monitor.        Dorie Granados RN  12/10/19 9007

## 2019-12-11 NOTE — ED NOTES
Printed pt's cardiac monitoring strips and placed in chart. Pt Sinus Gamaliel at 56 bpm.     Poonam Gould RN  12/10/19 2026

## 2019-12-11 NOTE — ED NOTES
Pt A&Ox4, ambulatory. Pt denies any dizziness at this time. Pt verbalizes relief from the dizziness after the meds given in the ER. VS stable. Pt's wife Hellen Delgado is driving him home. Pt & wife verbalize understanding medication usage and importance of follow-up care.     12/10/19 2691   Vital Signs   Temp 98.1 °F (36.7 °C)   Heart Rate 53   Resp 18   /86   Noninvasive MAP (mmHg) 114   Oxygen Therapy   SpO2 98 %   Vitals Timer   Restart Vitals Timer Yes   Restart Vitals Timer Yes        Poonam Gould, RN  12/10/19 4802

## 2019-12-11 NOTE — ED NOTES
"Pt reports always having dizziness but never while sitting or lying flat. This morning pt reports becoming \"extremely dizzy\" upon awaking and now it happens during sitting and trying to lay down.     Poonam Gould RN  12/10/19 1916    "

## 2019-12-13 ENCOUNTER — OFFICE VISIT (OUTPATIENT)
Dept: FAMILY MEDICINE CLINIC | Facility: CLINIC | Age: 74
End: 2019-12-13

## 2019-12-13 DIAGNOSIS — R42 VERTIGO: ICD-10-CM

## 2019-12-13 DIAGNOSIS — Z00.00 HEALTHCARE MAINTENANCE: ICD-10-CM

## 2019-12-13 DIAGNOSIS — I48.0 PAROXYSMAL ATRIAL FIBRILLATION (HCC): Primary | ICD-10-CM

## 2019-12-13 DIAGNOSIS — R74.8 ELEVATED LIVER ENZYMES: ICD-10-CM

## 2019-12-13 DIAGNOSIS — K21.9 GASTROESOPHAGEAL REFLUX DISEASE WITHOUT ESOPHAGITIS: ICD-10-CM

## 2019-12-13 DIAGNOSIS — R79.89 ELEVATED TSH: ICD-10-CM

## 2019-12-13 DIAGNOSIS — I10 ESSENTIAL HYPERTENSION: ICD-10-CM

## 2019-12-13 DIAGNOSIS — E11.42 TYPE 2 DIABETES MELLITUS WITH PERIPHERAL NEUROPATHY (HCC): ICD-10-CM

## 2019-12-13 DIAGNOSIS — I25.10 CORONARY ARTERY DISEASE INVOLVING NATIVE CORONARY ARTERY OF NATIVE HEART WITHOUT ANGINA PECTORIS: ICD-10-CM

## 2019-12-13 DIAGNOSIS — E53.8 B12 DEFICIENCY: ICD-10-CM

## 2019-12-13 DIAGNOSIS — E78.2 MIXED HYPERLIPIDEMIA: ICD-10-CM

## 2019-12-13 PROCEDURE — 36415 COLL VENOUS BLD VENIPUNCTURE: CPT | Performed by: GENERAL PRACTICE

## 2019-12-13 PROCEDURE — 99214 OFFICE O/P EST MOD 30 MIN: CPT | Performed by: GENERAL PRACTICE

## 2019-12-13 RX ORDER — DOXAZOSIN 2 MG/1
TABLET ORAL
Qty: 90 TABLET | Refills: 3 | Status: SHIPPED | OUTPATIENT
Start: 2019-12-13 | End: 2020-01-01

## 2019-12-13 RX ORDER — BENAZEPRIL HYDROCHLORIDE 40 MG/1
40 TABLET, FILM COATED ORAL DAILY
Qty: 90 TABLET | Refills: 3 | Status: SHIPPED | OUTPATIENT
Start: 2019-12-13 | End: 2020-01-01

## 2019-12-13 NOTE — PROGRESS NOTES
Subjective   Aaron Delgado is a 74 y.o. male.     History of Present Illness     Dizziness  He gives an approximate 1 year history of intermittent dizziness.  This has been worse since last here and underwent an ER evaluation on 11/27/2019 after a fall.  The dizziness is described as a sense of movement that occurs with changes in position and resolves promptly with rest.  To date he has had no associated symptoms and specifically denies any hearing loss, tinnitus, or difficulty with his eye hand coordination.  MRI of the brain performed on 2/18/2019 was unremarkable.  He has been scheduled to undergo an ENT assessment next week    Atrial Fibrillation  Patient has a history of atrial fibrillation. The patient denies palpitations, lightheadedness, diaphoresis or syncope. The patient has a past history of CAD, DM, HTN and hyperlipidemia.  He remains on apixaban. He denies  bleeding.  He continues to be followed by cardiology and will undergo a reassessment next week as well  Lab Results   Component Value Date    WBC 8.12 12/10/2019    HGB 14.7 12/10/2019    HCT 46.7 12/10/2019    MCV 90.7 12/10/2019     12/10/2019     Coronary Artery Disease  S/P CABG.  He has noted a persistent improvement in his lower extremity edema since last here.  He continues to deny any chest pain or palpitations and there is no history of any lightheadedness, or shortness of breath.     Type 2 Diabetes Mellitus  He admits to mild numbness and tingling of both feet.  There is no history of any polyuria, polydipsia, skin breakdown, or hypoglycemia.  He is currently on metformin, sitagliptin, and empagliflozin. His last diabetic eye exam was within 1 year.      Hyperlipidemia  He remains on simvastatin, and ezetimibewith no apparent side effects.    Hypertension  He remains on benazepril and metoprolol.  His blood pressure has been consistently elevated since last here.  Amlodipine was discontinued due to increased lower extremity edema.   Chlorthalidone was discontinued due to hyponatremia  Lab Results   Component Value Date    GLUCOSE 156 (H) 12/10/2019    BUN 13 12/13/2019    CREATININE 1.27 12/13/2019    EGFRIFNONA 56 (L) 12/13/2019    EGFRIFAFRI 67 12/13/2019    BCR 10.2 12/13/2019    K 4.7 12/13/2019    CO2 24.9 12/13/2019    CALCIUM 9.5 12/13/2019    PROTENTOTREF 6.8 12/13/2019    ALBUMIN 3.90 12/13/2019    LABIL2 1.3 12/13/2019    AST 88 (H) 12/13/2019    ALT 62 (H) 12/13/2019     The following portions of the patient's history were reviewed and updated as appropriate: allergies, current medications, past medical history, past social history and problem list.    Review of Systems   Constitutional: Negative for chills, fatigue and fever.   HENT: Negative for congestion, ear pain, rhinorrhea, sneezing, sore throat and voice change.    Eyes: Negative for visual disturbance.   Respiratory: Negative for cough, shortness of breath and wheezing.    Cardiovascular: Positive for leg swelling. Negative for chest pain and palpitations.   Gastrointestinal: Negative for abdominal pain, blood in stool, constipation, diarrhea, nausea and vomiting.   Endocrine: Negative for polydipsia and polyuria.   Genitourinary: Negative for difficulty urinating, dysuria, frequency, hematuria and urgency.   Musculoskeletal: Positive for arthralgias. Negative for back pain, joint swelling, myalgias and neck pain.   Neurological: Positive for dizziness. Negative for weakness, numbness and headaches.   Psychiatric/Behavioral: Negative for dysphoric mood and sleep disturbance. The patient is not nervous/anxious.      Objective   Physical Exam   Constitutional: He is oriented to person, place, and time. He appears well-developed and well-nourished. He is cooperative. He does not have a sickly appearance. No distress.   Bright and in good spirits. No apparent distress. No pallor, jaundice, diaphoresis, or cyanosis.   HENT:   Head: Atraumatic.   Right Ear: Tympanic membrane,  external ear and ear canal normal.   Left Ear: Tympanic membrane, external ear and ear canal normal.   Mouth/Throat: Oropharynx is clear and moist.   Eyes: Pupils are equal, round, and reactive to light. EOM are normal. No scleral icterus.   Neck: No JVD present. Carotid bruit is not present. No tracheal deviation present. No thyromegaly present.   Cardiovascular: Normal rate, regular rhythm, S1 normal, S2 normal, normal heart sounds and intact distal pulses. Exam reveals no gallop.   No murmur heard.  Pulmonary/Chest: Breath sounds normal. He has no wheezes. He has no rales.   Musculoskeletal: He exhibits no deformity.   No peripheral joint redness or warmth.     Vascular Status -  His right foot exhibits no edema (compression hose). His left foot exhibits no edema (compression hose).  Lymphadenopathy:        Head (right side): No submandibular adenopathy present.        Head (left side): No submandibular adenopathy present.     He has no cervical adenopathy.   Neurological: He is alert and oriented to person, place, and time. No cranial nerve deficit. He exhibits normal muscle tone. Coordination normal.   Skin: Skin is warm and dry. No rash noted. He is not diaphoretic. No pallor. Nails show no clubbing.   Psychiatric: He has a normal mood and affect. His behavior is normal.     Assessment/Plan   Problems Addressed this Visit        Cardiovascular and Mediastinum    CAD (coronary artery disease)  Reminded regarding the importance of risk factor modification.  Follow up with cardiology     Mixed hyperlipidemia  Encouraged to continue to work on his diet and exercise plan.  Continue current medication  Updated labs drawn.    Relevant Orders    Lipid Panel (Completed)    Essential hypertension   Hypertension: elevated today. Evidence of target organ damage: coronary artery disease.   As above  Reviewed options and agreed on a trial of doxazosin.  Patient advised of the potential interaction with sildenafil and will  exercise caution    Relevant Medications    doxazosin (CARDURA) 2 MG tablet    benazepril (LOTENSIN) 40 MG tablet    Other Relevant Orders    Comprehensive Metabolic Panel (Completed)    Paroxysmal atrial fibrillation (CMS/HCC)    Rate control is appropriate.. Patient is anticoagulated.   Avoid caffeine.  Avoid oral decongestants.  Continue current medication.   Follow up with cardiology.       Digestive    Gastroesophageal reflux disease without esophagitis    B12 deficiency  Continue supplementation with monitoring.       Endocrine    Type 2 diabetes mellitus with peripheral neuropathy (CMS/HCC)  Diabetes mellitus Type II, under acceptable control.   Encouraged to continue to pursue ADA diet  Encouraged aerobic exercise.  Continue current medication    Relevant Orders    Hemoglobin A1c (Completed)       Other    Healthcare maintenance  Patient has already received a flu shot fall.    Elevated liver enzymes  Will continue to monitor    Vertigo  Follow up with ENT    Elevated TSH  Will continue to monitor

## 2019-12-14 VITALS
TEMPERATURE: 98.6 F | HEIGHT: 69 IN | DIASTOLIC BLOOD PRESSURE: 74 MMHG | SYSTOLIC BLOOD PRESSURE: 172 MMHG | RESPIRATION RATE: 12 BRPM | HEART RATE: 61 BPM | WEIGHT: 196 LBS | OXYGEN SATURATION: 91 % | BODY MASS INDEX: 29.03 KG/M2

## 2019-12-14 DIAGNOSIS — E11.42 TYPE 2 DIABETES MELLITUS WITH PERIPHERAL NEUROPATHY (HCC): Primary | ICD-10-CM

## 2019-12-14 LAB
ALBUMIN SERPL-MCNC: 3.9 G/DL (ref 3.5–5.2)
ALBUMIN/GLOB SERPL: 1.3 G/DL
ALP SERPL-CCNC: 199 U/L (ref 39–117)
ALT SERPL-CCNC: 62 U/L (ref 1–41)
AST SERPL-CCNC: 88 U/L (ref 1–40)
BILIRUB SERPL-MCNC: 0.7 MG/DL (ref 0.2–1.2)
BUN SERPL-MCNC: 13 MG/DL (ref 8–23)
BUN/CREAT SERPL: 10.2 (ref 7–25)
CALCIUM SERPL-MCNC: 9.5 MG/DL (ref 8.6–10.5)
CHLORIDE SERPL-SCNC: 104 MMOL/L (ref 98–107)
CHOLEST SERPL-MCNC: 98 MG/DL (ref 0–200)
CO2 SERPL-SCNC: 24.9 MMOL/L (ref 22–29)
CREAT SERPL-MCNC: 1.27 MG/DL (ref 0.76–1.27)
GLOBULIN SER CALC-MCNC: 2.9 GM/DL
GLUCOSE SERPL-MCNC: 107 MG/DL (ref 65–99)
HBA1C MFR BLD: 6.9 % (ref 4.8–5.6)
HDLC SERPL-MCNC: 28 MG/DL (ref 40–60)
LDLC SERPL CALC-MCNC: 39 MG/DL (ref 0–100)
POTASSIUM SERPL-SCNC: 4.7 MMOL/L (ref 3.5–5.2)
PROT SERPL-MCNC: 6.8 G/DL (ref 6–8.5)
SODIUM SERPL-SCNC: 142 MMOL/L (ref 136–145)
TRIGL SERPL-MCNC: 156 MG/DL (ref 0–150)
VLDLC SERPL CALC-MCNC: 31.2 MG/DL

## 2019-12-14 NOTE — ED PROVIDER NOTES
Subjective   74-year-old male in the emergency department complaining of waxing waning vertigo that has been present since this last March.  Patient reports that his episode of vertigo is very similar to all prior episodes over the last several months.  Came to the emergency department for some relief.  Denies vomiting, diarrhea, shortness of breath, chest pain, headache, blurry vision, chills, or fever.  Patient has significant past medical history of A. fib, CHF, coronary artery disease, hyperlipidemia, and hypertension.      History provided by:  Patient   used: No    Dizziness   Quality:  Vertigo  Severity:  Moderate  Onset quality:  Gradual  Timing:  Intermittent  Progression:  Unchanged  Chronicity:  Chronic  Context: head movement and standing up    Context: not when bending over, not with bowel movement, not with ear pain, not with eye movement, not with inactivity, not with loss of consciousness, not with medication, not with physical activity and not when urinating    Relieved by:  Nothing  Worsened by:  Nothing  Ineffective treatments:  None tried  Associated symptoms: nausea    Associated symptoms: no blood in stool, no chest pain, no diarrhea, no headaches, no hearing loss, no palpitations, no shortness of breath, no syncope, no tinnitus, no vision changes, no vomiting and no weakness    Nausea:     Severity:  Mild    Onset quality:  Gradual    Timing:  Intermittent    Progression:  Unchanged  Risk factors: heart disease and hx of vertigo    Risk factors: no anemia, no hx of stroke, no Meniere's disease, no multiple medications and no new medications        Review of Systems   HENT: Negative for hearing loss and tinnitus.    Respiratory: Negative for shortness of breath.    Cardiovascular: Negative for chest pain, palpitations and syncope.   Gastrointestinal: Positive for nausea. Negative for blood in stool, diarrhea and vomiting.   Neurological: Positive for dizziness. Negative for  weakness and headaches.   All other systems reviewed and are negative.      Past Medical History:   Diagnosis Date   • A-fib (CMS/HCC)    • CHF (congestive heart failure) (CMS/HCC)    • Coronary artery disease    • Hyperlipidemia    • Hypertension        No Known Allergies    Past Surgical History:   Procedure Laterality Date   • CORONARY ARTERY BYPASS GRAFT         History reviewed. No pertinent family history.    Social History     Socioeconomic History   • Marital status:      Spouse name: Not on file   • Number of children: Not on file   • Years of education: Not on file   • Highest education level: Not on file   Occupational History   • Occupation: Retired   Tobacco Use   • Smoking status: Never Smoker   • Smokeless tobacco: Never Used   Substance and Sexual Activity   • Alcohol use: No     Frequency: Never   • Drug use: No   • Sexual activity: Defer           Objective   Physical Exam   Constitutional: He is oriented to person, place, and time. He appears well-developed and well-nourished.  Non-toxic appearance. No distress.   HENT:   Head: Normocephalic and atraumatic.   Right Ear: External ear normal.   Left Ear: External ear normal.   Nose: Nose normal.   Mouth/Throat: Oropharynx is clear and moist and mucous membranes are normal. No oropharyngeal exudate. No tonsillar exudate.   Eyes: Pupils are equal, round, and reactive to light. Conjunctivae, EOM and lids are normal.   Neck: Normal range of motion and full passive range of motion without pain. Neck supple. No thyromegaly present.   Cardiovascular: Normal rate, regular rhythm, S1 normal, S2 normal, normal heart sounds, intact distal pulses and normal pulses.   Pulmonary/Chest: Effort normal and breath sounds normal. No tachypnea. No respiratory distress. He has no decreased breath sounds. He has no wheezes. He has no rales. He exhibits no tenderness.   Abdominal: Soft. Normal appearance and bowel sounds are normal. He exhibits no distension. There  is no tenderness. There is no rebound and no guarding.   Musculoskeletal: Normal range of motion. He exhibits no edema, tenderness or deformity.   Lymphadenopathy:     He has no cervical adenopathy.   Neurological: He is alert and oriented to person, place, and time. He has normal strength. No cranial nerve deficit or sensory deficit. GCS eye subscore is 4. GCS verbal subscore is 5. GCS motor subscore is 6.   Skin: Skin is warm, dry and intact. No rash noted. He is not diaphoretic. No erythema. No pallor.   Psychiatric: He has a normal mood and affect. His speech is normal and behavior is normal. Judgment and thought content normal. Cognition and memory are normal.   Nursing note and vitals reviewed.      Procedures           ED Course  ED Course as of Dec 13 2043   Tue Dec 10, 2019   2333 IMPRESSION:  Senescent changes without acute abnormality.   CT Head Without Contrast [ES]   Fri Dec 13, 2019   2042 Vent. Rate : 058 BPM     Atrial Rate : 058 BPM     P-R Int : 194 ms          QRS Dur : 108 ms      QT Int : 486 ms       P-R-T Axes : 046 013 047 degrees     QTc Int : 477 ms     Sinus bradycardia  Nonspecific T wave abnormality  Prolonged QT  Abnormal ECG  When compared with ECG of 27-NOV-2019 16:13,  Nonspecific T wave abnormality, worse in Lateral leads   ECG 12 Lead [ES]   2042 Patient reports improvement with treatment in the emergency department, and has requested to be discharged home at this time.  Has follow-up with an ear nose and throat specialist within the next week, and will return to the emergency department with any worsening symptoms.    [ES]      ED Course User Index  [ES] Elton Ascencio MD                      No data recorded                        MDM  Number of Diagnoses or Management Options  Vertigo: established and worsening     Amount and/or Complexity of Data Reviewed  Clinical lab tests: reviewed and ordered  Tests in the radiology section of CPT®: reviewed and ordered  Tests  in the medicine section of CPT®: reviewed and ordered  Review and summarize past medical records: yes  Independent visualization of images, tracings, or specimens: yes    Risk of Complications, Morbidity, and/or Mortality  Presenting problems: moderate  Diagnostic procedures: moderate  Management options: moderate    Patient Progress  Patient progress: stable      Final diagnoses:   Vertigo              Elton Ascencio MD  12/13/19 2043

## 2019-12-17 ENCOUNTER — OFFICE VISIT (OUTPATIENT)
Dept: CARDIOLOGY | Facility: CLINIC | Age: 74
End: 2019-12-17

## 2019-12-17 VITALS
WEIGHT: 187 LBS | OXYGEN SATURATION: 96 % | BODY MASS INDEX: 27.7 KG/M2 | HEIGHT: 69 IN | DIASTOLIC BLOOD PRESSURE: 87 MMHG | SYSTOLIC BLOOD PRESSURE: 194 MMHG | HEART RATE: 58 BPM

## 2019-12-17 DIAGNOSIS — I10 ESSENTIAL HYPERTENSION: ICD-10-CM

## 2019-12-17 DIAGNOSIS — I25.10 CORONARY ARTERY DISEASE INVOLVING NATIVE CORONARY ARTERY OF NATIVE HEART WITHOUT ANGINA PECTORIS: Primary | ICD-10-CM

## 2019-12-17 DIAGNOSIS — I48.0 PAROXYSMAL ATRIAL FIBRILLATION (HCC): ICD-10-CM

## 2019-12-17 DIAGNOSIS — E78.5 DYSLIPIDEMIA: ICD-10-CM

## 2019-12-17 PROCEDURE — 99214 OFFICE O/P EST MOD 30 MIN: CPT | Performed by: INTERNAL MEDICINE

## 2019-12-17 RX ORDER — AMLODIPINE BESYLATE 5 MG/1
5 TABLET ORAL DAILY
COMMUNITY
End: 2019-12-17 | Stop reason: SDUPTHER

## 2019-12-18 RX ORDER — AMLODIPINE BESYLATE 5 MG/1
10 TABLET ORAL EVERY EVENING
Qty: 30 TABLET | Refills: 5 | Status: SHIPPED | OUTPATIENT
Start: 2019-12-18 | End: 2020-03-12 | Stop reason: SDUPTHER

## 2019-12-19 NOTE — PROGRESS NOTES
Spoke with pt about the following per Dr. Nur. VH    -- Please let patient know that i'm going to increase his glyxambi from the 10/5 to the 25/5 tablets when he runs out of the former - mayra emailed a script to his mail order pharmacy

## 2020-01-01 ENCOUNTER — OFFICE VISIT (OUTPATIENT)
Dept: CARDIOLOGY | Facility: CLINIC | Age: 75
End: 2020-01-01

## 2020-01-01 ENCOUNTER — OFFICE VISIT (OUTPATIENT)
Dept: FAMILY MEDICINE CLINIC | Facility: CLINIC | Age: 75
End: 2020-01-01

## 2020-01-01 ENCOUNTER — HOSPITAL ENCOUNTER (OUTPATIENT)
Dept: CT IMAGING | Facility: HOSPITAL | Age: 75
Discharge: HOME | End: 2020-07-13
Admitting: PHYSICIAN ASSISTANT

## 2020-01-01 ENCOUNTER — HOSPITAL ENCOUNTER (OUTPATIENT)
Facility: HOSPITAL | Age: 75
Setting detail: HOSPITAL OUTPATIENT SURGERY
Discharge: HOME | End: 2020-08-17
Attending: INTERNAL MEDICINE | Admitting: INTERNAL MEDICINE

## 2020-01-01 ENCOUNTER — OFFICE VISIT (OUTPATIENT)
Dept: GASTROENTEROLOGY | Facility: CLINIC | Age: 75
End: 2020-01-01

## 2020-01-01 ENCOUNTER — LAB (OUTPATIENT)
Dept: LAB | Facility: HOSPITAL | Age: 75
End: 2020-01-01

## 2020-01-01 ENCOUNTER — TELEPHONE (OUTPATIENT)
Dept: FAMILY MEDICINE CLINIC | Facility: CLINIC | Age: 75
End: 2020-01-01

## 2020-01-01 ENCOUNTER — HOSPITAL ENCOUNTER (OUTPATIENT)
Dept: GENERAL RADIOLOGY | Facility: HOSPITAL | Age: 75
Discharge: HOME | End: 2020-07-10
Admitting: NURSE PRACTITIONER

## 2020-01-01 ENCOUNTER — ANESTHESIA (OUTPATIENT)
Dept: PERIOP | Facility: HOSPITAL | Age: 75
End: 2020-01-01

## 2020-01-01 ENCOUNTER — TELEPHONE (OUTPATIENT)
Dept: GASTROENTEROLOGY | Facility: CLINIC | Age: 75
End: 2020-01-01

## 2020-01-01 ENCOUNTER — LAB (OUTPATIENT)
Dept: FAMILY MEDICINE CLINIC | Facility: CLINIC | Age: 75
End: 2020-01-01

## 2020-01-01 ENCOUNTER — ANESTHESIA EVENT (OUTPATIENT)
Dept: PERIOP | Facility: HOSPITAL | Age: 75
End: 2020-01-01

## 2020-01-01 ENCOUNTER — PRIOR AUTHORIZATION (OUTPATIENT)
Dept: FAMILY MEDICINE CLINIC | Facility: CLINIC | Age: 75
End: 2020-01-01

## 2020-01-01 ENCOUNTER — RESULTS ENCOUNTER (OUTPATIENT)
Dept: FAMILY MEDICINE CLINIC | Facility: CLINIC | Age: 75
End: 2020-01-01

## 2020-01-01 ENCOUNTER — CONSULT (OUTPATIENT)
Dept: GASTROENTEROLOGY | Facility: CLINIC | Age: 75
End: 2020-01-01

## 2020-01-01 VITALS
RESPIRATION RATE: 14 BRPM | BODY MASS INDEX: 30.21 KG/M2 | DIASTOLIC BLOOD PRESSURE: 70 MMHG | SYSTOLIC BLOOD PRESSURE: 130 MMHG | TEMPERATURE: 99.9 F | OXYGEN SATURATION: 95 % | HEIGHT: 69 IN | WEIGHT: 204 LBS | HEART RATE: 64 BPM

## 2020-01-01 VITALS
WEIGHT: 197 LBS | RESPIRATION RATE: 16 BRPM | OXYGEN SATURATION: 96 % | DIASTOLIC BLOOD PRESSURE: 58 MMHG | HEIGHT: 69 IN | SYSTOLIC BLOOD PRESSURE: 116 MMHG | HEART RATE: 61 BPM | BODY MASS INDEX: 29.18 KG/M2 | TEMPERATURE: 97.9 F

## 2020-01-01 VITALS
SYSTOLIC BLOOD PRESSURE: 118 MMHG | WEIGHT: 186 LBS | TEMPERATURE: 96.9 F | BODY MASS INDEX: 27.55 KG/M2 | OXYGEN SATURATION: 96 % | HEART RATE: 59 BPM | DIASTOLIC BLOOD PRESSURE: 60 MMHG | HEIGHT: 69 IN

## 2020-01-01 VITALS
HEART RATE: 61 BPM | BODY MASS INDEX: 28.11 KG/M2 | TEMPERATURE: 97.5 F | SYSTOLIC BLOOD PRESSURE: 109 MMHG | DIASTOLIC BLOOD PRESSURE: 63 MMHG | OXYGEN SATURATION: 99 % | HEIGHT: 69 IN | WEIGHT: 189.8 LBS

## 2020-01-01 VITALS
BODY MASS INDEX: 30.21 KG/M2 | HEIGHT: 69 IN | HEART RATE: 57 BPM | DIASTOLIC BLOOD PRESSURE: 68 MMHG | WEIGHT: 204 LBS | OXYGEN SATURATION: 93 % | SYSTOLIC BLOOD PRESSURE: 124 MMHG

## 2020-01-01 VITALS
SYSTOLIC BLOOD PRESSURE: 119 MMHG | WEIGHT: 194.5 LBS | BODY MASS INDEX: 28.81 KG/M2 | OXYGEN SATURATION: 94 % | DIASTOLIC BLOOD PRESSURE: 67 MMHG | RESPIRATION RATE: 20 BRPM | HEIGHT: 69 IN | HEART RATE: 61 BPM | TEMPERATURE: 97.2 F

## 2020-01-01 VITALS
WEIGHT: 187 LBS | DIASTOLIC BLOOD PRESSURE: 70 MMHG | OXYGEN SATURATION: 95 % | HEIGHT: 69 IN | BODY MASS INDEX: 27.7 KG/M2 | TEMPERATURE: 97.3 F | SYSTOLIC BLOOD PRESSURE: 122 MMHG | HEART RATE: 56 BPM

## 2020-01-01 VITALS
HEIGHT: 69 IN | SYSTOLIC BLOOD PRESSURE: 122 MMHG | HEART RATE: 60 BPM | WEIGHT: 201.2 LBS | DIASTOLIC BLOOD PRESSURE: 68 MMHG | TEMPERATURE: 98.3 F | OXYGEN SATURATION: 96 % | BODY MASS INDEX: 29.8 KG/M2

## 2020-01-01 VITALS
BODY MASS INDEX: 30.18 KG/M2 | OXYGEN SATURATION: 96 % | HEIGHT: 69 IN | SYSTOLIC BLOOD PRESSURE: 124 MMHG | TEMPERATURE: 98.6 F | HEART RATE: 56 BPM | DIASTOLIC BLOOD PRESSURE: 76 MMHG | WEIGHT: 203.8 LBS

## 2020-01-01 VITALS
OXYGEN SATURATION: 96 % | WEIGHT: 191 LBS | HEART RATE: 64 BPM | DIASTOLIC BLOOD PRESSURE: 72 MMHG | SYSTOLIC BLOOD PRESSURE: 126 MMHG | TEMPERATURE: 97.3 F | HEIGHT: 69 IN | RESPIRATION RATE: 14 BRPM | BODY MASS INDEX: 28.29 KG/M2

## 2020-01-01 VITALS
DIASTOLIC BLOOD PRESSURE: 66 MMHG | WEIGHT: 200.4 LBS | HEART RATE: 78 BPM | TEMPERATURE: 97.1 F | OXYGEN SATURATION: 96 % | BODY MASS INDEX: 29.68 KG/M2 | SYSTOLIC BLOOD PRESSURE: 120 MMHG | HEIGHT: 69 IN

## 2020-01-01 DIAGNOSIS — I48.0 PAROXYSMAL ATRIAL FIBRILLATION (HCC): Primary | ICD-10-CM

## 2020-01-01 DIAGNOSIS — K21.9 GASTROESOPHAGEAL REFLUX DISEASE WITHOUT ESOPHAGITIS: ICD-10-CM

## 2020-01-01 DIAGNOSIS — E11.42 TYPE 2 DIABETES MELLITUS WITH PERIPHERAL NEUROPATHY (HCC): Chronic | ICD-10-CM

## 2020-01-01 DIAGNOSIS — R79.89 ELEVATED TSH: ICD-10-CM

## 2020-01-01 DIAGNOSIS — R74.8 ABNORMAL AST AND ALT: ICD-10-CM

## 2020-01-01 DIAGNOSIS — E03.8 HYPOTHYROIDISM DUE TO HASHIMOTO'S THYROIDITIS: Primary | ICD-10-CM

## 2020-01-01 DIAGNOSIS — I10 ESSENTIAL HYPERTENSION: Chronic | ICD-10-CM

## 2020-01-01 DIAGNOSIS — R74.8 ELEVATED ALKALINE PHOSPHATASE LEVEL: ICD-10-CM

## 2020-01-01 DIAGNOSIS — Z86.010 HISTORY OF ADENOMATOUS POLYP OF COLON: ICD-10-CM

## 2020-01-01 DIAGNOSIS — I25.810 CORONARY ARTERY DISEASE INVOLVING CORONARY BYPASS GRAFT OF NATIVE HEART WITHOUT ANGINA PECTORIS: ICD-10-CM

## 2020-01-01 DIAGNOSIS — I48.0 PAROXYSMAL ATRIAL FIBRILLATION (HCC): ICD-10-CM

## 2020-01-01 DIAGNOSIS — Z00.00 HEALTHCARE MAINTENANCE: ICD-10-CM

## 2020-01-01 DIAGNOSIS — R53.83 FATIGUE, UNSPECIFIED TYPE: ICD-10-CM

## 2020-01-01 DIAGNOSIS — M54.50 ACUTE MIDLINE LOW BACK PAIN WITHOUT SCIATICA: ICD-10-CM

## 2020-01-01 DIAGNOSIS — R79.9 ABNORMAL FINDING OF BLOOD CHEMISTRY, UNSPECIFIED: ICD-10-CM

## 2020-01-01 DIAGNOSIS — I10 ESSENTIAL HYPERTENSION: ICD-10-CM

## 2020-01-01 DIAGNOSIS — R74.8 ELEVATED ALKALINE PHOSPHATASE LEVEL: Primary | ICD-10-CM

## 2020-01-01 DIAGNOSIS — R74.8 ELEVATED LIVER ENZYMES: ICD-10-CM

## 2020-01-01 DIAGNOSIS — E11.42 TYPE 2 DIABETES MELLITUS WITH PERIPHERAL NEUROPATHY (HCC): ICD-10-CM

## 2020-01-01 DIAGNOSIS — R10.84 GENERALIZED ABDOMINAL PAIN: ICD-10-CM

## 2020-01-01 DIAGNOSIS — E53.8 B12 DEFICIENCY: ICD-10-CM

## 2020-01-01 DIAGNOSIS — R14.0 BLOATING: ICD-10-CM

## 2020-01-01 DIAGNOSIS — E06.3 HYPOTHYROIDISM DUE TO HASHIMOTO'S THYROIDITIS: Primary | ICD-10-CM

## 2020-01-01 DIAGNOSIS — E78.2 MIXED HYPERLIPIDEMIA: ICD-10-CM

## 2020-01-01 DIAGNOSIS — E03.8 HYPOTHYROIDISM DUE TO HASHIMOTO'S THYROIDITIS: ICD-10-CM

## 2020-01-01 DIAGNOSIS — R74.8 ELEVATED ALKALINE PHOSPHATASE IN NEWBORN: ICD-10-CM

## 2020-01-01 DIAGNOSIS — E11.42 TYPE 2 DIABETES MELLITUS WITH PERIPHERAL NEUROPATHY (HCC): Primary | Chronic | ICD-10-CM

## 2020-01-01 DIAGNOSIS — R30.0 DYSURIA: Primary | ICD-10-CM

## 2020-01-01 DIAGNOSIS — H02.403 PTOSIS OF BOTH EYELIDS: ICD-10-CM

## 2020-01-01 DIAGNOSIS — R93.3 ABNORMAL CT SCAN, STOMACH: Primary | ICD-10-CM

## 2020-01-01 DIAGNOSIS — R93.3 ABNORMAL CT SCAN, STOMACH: ICD-10-CM

## 2020-01-01 DIAGNOSIS — R79.89 ABNORMAL TSH: Primary | ICD-10-CM

## 2020-01-01 DIAGNOSIS — E06.3 HYPOTHYROIDISM DUE TO HASHIMOTO'S THYROIDITIS: ICD-10-CM

## 2020-01-01 DIAGNOSIS — K76.0 NAFLD (NONALCOHOLIC FATTY LIVER DISEASE): ICD-10-CM

## 2020-01-01 DIAGNOSIS — M54.50 ACUTE MIDLINE LOW BACK PAIN WITHOUT SCIATICA: Primary | ICD-10-CM

## 2020-01-01 DIAGNOSIS — R10.13 EPIGASTRIC ABDOMINAL PAIN: ICD-10-CM

## 2020-01-01 DIAGNOSIS — N52.01 ERECTILE DYSFUNCTION DUE TO ARTERIAL INSUFFICIENCY: ICD-10-CM

## 2020-01-01 DIAGNOSIS — M51.36 DDD (DEGENERATIVE DISC DISEASE), LUMBAR: Primary | Chronic | ICD-10-CM

## 2020-01-01 DIAGNOSIS — R42 VERTIGO: ICD-10-CM

## 2020-01-01 DIAGNOSIS — L40.9 PSORIASIS: ICD-10-CM

## 2020-01-01 DIAGNOSIS — E78.2 MIXED HYPERLIPIDEMIA: Chronic | ICD-10-CM

## 2020-01-01 DIAGNOSIS — K57.90 DIVERTICULOSIS: ICD-10-CM

## 2020-01-01 DIAGNOSIS — Z87.19 HISTORY OF FATTY INFILTRATION OF LIVER: ICD-10-CM

## 2020-01-01 DIAGNOSIS — I25.10 CORONARY ARTERY DISEASE INVOLVING NATIVE CORONARY ARTERY OF NATIVE HEART WITHOUT ANGINA PECTORIS: ICD-10-CM

## 2020-01-01 DIAGNOSIS — I48.0 PAROXYSMAL ATRIAL FIBRILLATION (HCC): Chronic | ICD-10-CM

## 2020-01-01 DIAGNOSIS — Z87.442 HISTORY OF NEPHROLITHIASIS: ICD-10-CM

## 2020-01-01 DIAGNOSIS — G89.29 CHRONIC RIGHT SHOULDER PAIN: ICD-10-CM

## 2020-01-01 DIAGNOSIS — E78.5 HYPERLIPIDEMIA, UNSPECIFIED HYPERLIPIDEMIA TYPE: ICD-10-CM

## 2020-01-01 DIAGNOSIS — E87.1 HYPONATREMIA: ICD-10-CM

## 2020-01-01 DIAGNOSIS — B37.9 CANDIDIASIS: ICD-10-CM

## 2020-01-01 DIAGNOSIS — M25.511 CHRONIC RIGHT SHOULDER PAIN: ICD-10-CM

## 2020-01-01 DIAGNOSIS — Z23 ENCOUNTER FOR IMMUNIZATION: ICD-10-CM

## 2020-01-01 DIAGNOSIS — M51.36 DDD (DEGENERATIVE DISC DISEASE), LUMBAR: ICD-10-CM

## 2020-01-01 DIAGNOSIS — E11.42 TYPE 2 DIABETES MELLITUS WITH PERIPHERAL NEUROPATHY (HCC): Primary | ICD-10-CM

## 2020-01-01 DIAGNOSIS — R74.8 ABNORMAL AST AND ALT: Primary | ICD-10-CM

## 2020-01-01 DIAGNOSIS — Z01.818 PREOP TESTING: ICD-10-CM

## 2020-01-01 DIAGNOSIS — I25.10 CORONARY ARTERY DISEASE INVOLVING NATIVE CORONARY ARTERY OF NATIVE HEART WITHOUT ANGINA PECTORIS: Primary | ICD-10-CM

## 2020-01-01 DIAGNOSIS — Z01.818 PREOP TESTING: Primary | ICD-10-CM

## 2020-01-01 LAB
A1AT SERPL-MCNC: 122 MG/DL (ref 101–187)
A2 MACROGLOB SERPL-MCNC: 327 MG/DL (ref 110–276)
ACTIN IGG SERPL-ACNC: 19 UNITS (ref 0–19)
ALBUMIN SERPL-MCNC: 2.9 G/DL (ref 3.5–5.2)
ALBUMIN SERPL-MCNC: 3.3 G/DL (ref 3.5–5.2)
ALBUMIN SERPL-MCNC: 3.9 G/DL (ref 3.5–5.2)
ALBUMIN SERPL-MCNC: 4 G/DL (ref 3.5–5.2)
ALBUMIN SERPL-MCNC: 4.1 G/DL (ref 3.5–5.2)
ALBUMIN/GLOB SERPL: 0.7 G/DL
ALBUMIN/GLOB SERPL: 1.1 G/DL
ALBUMIN/GLOB SERPL: 1.3 G/DL
ALP SERPL-CCNC: 421 U/L (ref 39–117)
ALP SERPL-CCNC: 422 U/L (ref 39–117)
ALP SERPL-CCNC: 467 U/L (ref 39–117)
ALP SERPL-CCNC: 549 U/L (ref 39–117)
ALP SERPL-CCNC: 644 U/L (ref 39–117)
ALT SERPL W P-5'-P-CCNC: 103 IU/L (ref 0–55)
ALT SERPL-CCNC: 44 U/L (ref 1–41)
ALT SERPL-CCNC: 56 U/L (ref 1–41)
ALT SERPL-CCNC: 64 U/L (ref 1–41)
ALT SERPL-CCNC: 80 U/L (ref 1–41)
ALT SERPL-CCNC: 80 U/L (ref 1–41)
APO A-I SERPL-MCNC: 99 MG/DL (ref 101–178)
AST SERPL W P-5'-P-CCNC: 174 IU/L (ref 0–40)
AST SERPL-CCNC: 113 U/L (ref 1–40)
AST SERPL-CCNC: 116 U/L (ref 1–40)
AST SERPL-CCNC: 118 U/L (ref 1–40)
AST SERPL-CCNC: 130 U/L (ref 1–40)
AST SERPL-CCNC: 144 U/L (ref 1–40)
BASOPHILS # BLD AUTO: 0.05 10*3/MM3 (ref 0–0.2)
BASOPHILS # BLD AUTO: 0.06 10*3/MM3 (ref 0–0.2)
BASOPHILS NFR BLD AUTO: 0.7 % (ref 0–1.5)
BASOPHILS NFR BLD AUTO: 1 % (ref 0–1.5)
BILIRUB BLD-MCNC: NEGATIVE MG/DL
BILIRUB BLD-MCNC: NEGATIVE MG/DL
BILIRUB SERPL-MCNC: 0.5 MG/DL (ref 0.2–1.2)
BILIRUB SERPL-MCNC: 0.5 MG/DL (ref 0–1.2)
BILIRUB SERPL-MCNC: 0.6 MG/DL (ref 0–1.2)
BILIRUB SERPL-MCNC: 0.7 MG/DL (ref 0–1.2)
BUN SERPL-MCNC: 11 MG/DL (ref 8–23)
BUN SERPL-MCNC: 12 MG/DL (ref 8–23)
BUN/CREAT SERPL: 11.7 (ref 7–25)
BUN/CREAT SERPL: 7.2 (ref 7–25)
BUN/CREAT SERPL: 7.7 (ref 7–25)
BUN/CREAT SERPL: 8.3 (ref 7–25)
BUN/CREAT SERPL: 8.6 (ref 7–25)
CALCIUM SERPL-MCNC: 9 MG/DL (ref 8.6–10.5)
CALCIUM SERPL-MCNC: 9.2 MG/DL (ref 8.6–10.5)
CALCIUM SERPL-MCNC: 9.6 MG/DL (ref 8.6–10.5)
CALCIUM SERPL-MCNC: 9.6 MG/DL (ref 8.6–10.5)
CALCIUM SERPL-MCNC: 9.9 MG/DL (ref 8.6–10.5)
CERULOPLASMIN SERPL-MCNC: 31.7 MG/DL (ref 16–31)
CHLORIDE SERPL-SCNC: 101 MMOL/L (ref 98–107)
CHLORIDE SERPL-SCNC: 102 MMOL/L (ref 98–107)
CHLORIDE SERPL-SCNC: 103 MMOL/L (ref 98–107)
CHLORIDE SERPL-SCNC: 104 MMOL/L (ref 98–107)
CHLORIDE SERPL-SCNC: 107 MMOL/L (ref 98–107)
CHOLEST SERPL-MCNC: 77 MG/DL (ref 0–200)
CHOLEST SERPL-MCNC: 90 MG/DL (ref 0–200)
CHOLEST SERPL-MCNC: 96 MG/DL (ref 100–199)
CLARITY, POC: ABNORMAL
CLARITY, POC: CLEAR
CO2 SERPL-SCNC: 19.1 MMOL/L (ref 22–29)
CO2 SERPL-SCNC: 20.6 MMOL/L (ref 22–29)
CO2 SERPL-SCNC: 21.1 MMOL/L (ref 22–29)
CO2 SERPL-SCNC: 22.3 MMOL/L (ref 22–29)
CO2 SERPL-SCNC: 24.5 MMOL/L (ref 22–29)
COLOR UR: YELLOW
COLOR UR: YELLOW
CREAT BLDA-MCNC: 1.3 MG/DL (ref 0.6–1.3)
CREAT SERPL-MCNC: 1.03 MG/DL (ref 0.76–1.27)
CREAT SERPL-MCNC: 1.28 MG/DL (ref 0.76–1.27)
CREAT SERPL-MCNC: 1.33 MG/DL (ref 0.76–1.27)
CREAT SERPL-MCNC: 1.43 MG/DL (ref 0.76–1.27)
CREAT SERPL-MCNC: 1.53 MG/DL (ref 0.76–1.27)
CRP SERPL-MCNC: 1.14 MG/DL (ref 0–0.5)
EOSINOPHIL # BLD AUTO: 0.11 10*3/MM3 (ref 0–0.4)
EOSINOPHIL # BLD AUTO: 0.2 10*3/MM3 (ref 0–0.4)
EOSINOPHIL NFR BLD AUTO: 1.6 % (ref 0.3–6.2)
EOSINOPHIL NFR BLD AUTO: 3.2 % (ref 0.3–6.2)
ERYTHROCYTE [DISTWIDTH] IN BLOOD BY AUTOMATED COUNT: 14.4 % (ref 12.3–15.4)
ERYTHROCYTE [DISTWIDTH] IN BLOOD BY AUTOMATED COUNT: 14.5 % (ref 12.3–15.4)
ERYTHROCYTE [SEDIMENTATION RATE] IN BLOOD BY WESTERGREN METHOD: 17 MM/HR (ref 0–20)
FIBROSIS SCORING:: ABNORMAL
FIBROSIS STAGE SERPL QL: ABNORMAL
GGT SERPL-CCNC: 841 IU/L (ref 0–65)
GLOBULIN SER CALC-MCNC: 3 GM/DL
GLOBULIN SER CALC-MCNC: 3.1 GM/DL
GLOBULIN SER CALC-MCNC: 3.9 GM/DL
GLUCOSE BLDC GLUCOMTR-MCNC: 111 MG/DL (ref 70–130)
GLUCOSE SERPL-MCNC: 111 MG/DL (ref 65–99)
GLUCOSE SERPL-MCNC: 122 MG/DL (ref 65–99)
GLUCOSE SERPL-MCNC: 146 MG/DL (ref 65–99)
GLUCOSE SERPL-MCNC: 154 MG/DL (ref 65–99)
GLUCOSE SERPL-MCNC: 170 MG/DL (ref 65–99)
GLUCOSE SERPL-MCNC: 195 MG/DL (ref 65–99)
GLUCOSE UR STRIP-MCNC: ABNORMAL MG/DL
GLUCOSE UR STRIP-MCNC: ABNORMAL MG/DL
HAPTOGLOB SERPL-MCNC: 280 MG/DL (ref 34–355)
HBA1C MFR BLD: 6 % (ref 4.8–5.6)
HBA1C MFR BLD: 6.1 % (ref 4.8–5.6)
HCT VFR BLD AUTO: 38.3 % (ref 37.5–51)
HCT VFR BLD AUTO: 40.8 % (ref 37.5–51)
HDLC SERPL-MCNC: 24 MG/DL (ref 40–60)
HDLC SERPL-MCNC: 29 MG/DL (ref 40–60)
HGB BLD-MCNC: 12.7 G/DL (ref 13–17.7)
HGB BLD-MCNC: 13.3 G/DL (ref 13–17.7)
IGA SERPL-MCNC: 279 MG/DL (ref 61–437)
IGG SERPL-MCNC: 1206 MG/DL (ref 603–1613)
IGM SERPL-MCNC: 135 MG/DL (ref 15–143)
IMM GRANULOCYTES # BLD AUTO: 0.02 10*3/MM3 (ref 0–0.05)
IMM GRANULOCYTES # BLD AUTO: 0.03 10*3/MM3 (ref 0–0.05)
IMM GRANULOCYTES NFR BLD AUTO: 0.3 % (ref 0–0.5)
IMM GRANULOCYTES NFR BLD AUTO: 0.4 % (ref 0–0.5)
INTERPRETATIONS: (REFERENCE): ABNORMAL
KETONES UR QL: NEGATIVE
KETONES UR QL: NEGATIVE
LAB AP CASE REPORT: NORMAL
LABORATORY COMMENT REPORT: ABNORMAL
LDLC SERPL CALC-MCNC: 25 MG/DL (ref 0–100)
LDLC SERPL CALC-MCNC: 33 MG/DL (ref 0–100)
LEUKOCYTE EST, POC: NEGATIVE
LEUKOCYTE EST, POC: NEGATIVE
LIVER FIBR SCORE SERPL CALC.FIBROSURE: 0.89 (ref 0–0.21)
LYMPHOCYTES # BLD AUTO: 0.91 10*3/MM3 (ref 0.7–3.1)
LYMPHOCYTES # BLD AUTO: 0.95 10*3/MM3 (ref 0.7–3.1)
LYMPHOCYTES NFR BLD AUTO: 13 % (ref 19.6–45.3)
LYMPHOCYTES NFR BLD AUTO: 15.2 % (ref 19.6–45.3)
MCH RBC QN AUTO: 28.5 PG (ref 26.6–33)
MCH RBC QN AUTO: 29.2 PG (ref 26.6–33)
MCHC RBC AUTO-ENTMCNC: 32.6 G/DL (ref 31.5–35.7)
MCHC RBC AUTO-ENTMCNC: 33.2 G/DL (ref 31.5–35.7)
MCV RBC AUTO: 87.4 FL (ref 79–97)
MCV RBC AUTO: 88 FL (ref 79–97)
MICROALBUMIN UR-MCNC: 11.9 UG/ML
MITOCHONDRIA M2 IGG SER-ACNC: <20 UNITS (ref 0–20)
MONOCYTES # BLD AUTO: 0.56 10*3/MM3 (ref 0.1–0.9)
MONOCYTES # BLD AUTO: 0.61 10*3/MM3 (ref 0.1–0.9)
MONOCYTES NFR BLD AUTO: 8.7 % (ref 5–12)
MONOCYTES NFR BLD AUTO: 9 % (ref 5–12)
NASH SCORING (REFERENCE): ABNORMAL
NECROINFLAMMATORY ACT GRADE SERPL QL: ABNORMAL
NECROINFLAMMATORY ACT SCORE SERPL: 0.5
NEUTROPHILS # BLD AUTO: 4.46 10*3/MM3 (ref 1.7–7)
NEUTROPHILS # BLD AUTO: 5.31 10*3/MM3 (ref 1.7–7)
NEUTROPHILS NFR BLD AUTO: 71.3 % (ref 42.7–76)
NEUTROPHILS NFR BLD AUTO: 75.6 % (ref 42.7–76)
NITRITE UR-MCNC: NEGATIVE MG/ML
NITRITE UR-MCNC: NEGATIVE MG/ML
NRBC BLD AUTO-RTO: 0 /100 WBC (ref 0–0.2)
NRBC BLD AUTO-RTO: 0 /100 WBC (ref 0–0.2)
PATH REPORT.FINAL DX SPEC: NORMAL
PH UR: 6 [PH] (ref 5–8)
PH UR: 6 [PH] (ref 5–8)
PLATELET # BLD AUTO: 214 10*3/MM3 (ref 140–450)
PLATELET # BLD AUTO: 222 10*3/MM3 (ref 140–450)
POTASSIUM SERPL-SCNC: 4.2 MMOL/L (ref 3.5–5.2)
POTASSIUM SERPL-SCNC: 4.3 MMOL/L (ref 3.5–5.2)
POTASSIUM SERPL-SCNC: 4.5 MMOL/L (ref 3.5–5.2)
POTASSIUM SERPL-SCNC: 4.5 MMOL/L (ref 3.5–5.2)
POTASSIUM SERPL-SCNC: 4.7 MMOL/L (ref 3.5–5.2)
PROT SERPL-MCNC: 6.3 G/DL (ref 6–8.5)
PROT SERPL-MCNC: 6.8 G/DL (ref 6–8.5)
PROT SERPL-MCNC: 6.9 G/DL (ref 6–8.5)
PROT SERPL-MCNC: 7 G/DL (ref 6–8.5)
PROT SERPL-MCNC: 7.2 G/DL (ref 6–8.5)
PROT UR STRIP-MCNC: NEGATIVE MG/DL
PROT UR STRIP-MCNC: NEGATIVE MG/DL
RBC # BLD AUTO: 4.35 10*6/MM3 (ref 4.14–5.8)
RBC # BLD AUTO: 4.67 10*6/MM3 (ref 4.14–5.8)
RBC # UR STRIP: NEGATIVE /UL
RBC # UR STRIP: NEGATIVE /UL
REF LAB TEST METHOD: NORMAL
SARS-COV-2 RNA RESP QL NAA+PROBE: NOT DETECTED
SERVICE CMNT-IMP: ABNORMAL
SODIUM SERPL-SCNC: 133 MMOL/L (ref 136–145)
SODIUM SERPL-SCNC: 135 MMOL/L (ref 136–145)
SODIUM SERPL-SCNC: 139 MMOL/L (ref 136–145)
SP GR UR: 1.01 (ref 1–1.03)
SP GR UR: 1.02 (ref 1–1.03)
STEATOSIS GRADE (REFERENCE): ABNORMAL
STEATOSIS GRADING (REFERENCE): ABNORMAL
STEATOSIS SCORE (REFERENCE): 0.95 (ref 0–0.3)
TRIGL SERPL-MCNC: 129 MG/DL (ref 0–149)
TRIGL SERPL-MCNC: 141 MG/DL (ref 0–150)
TRIGL SERPL-MCNC: 141 MG/DL (ref 0–150)
TSH SERPL DL<=0.005 MIU/L-ACNC: 17.3 UIU/ML (ref 0.27–4.2)
TSH SERPL DL<=0.005 MIU/L-ACNC: 19.6 UIU/ML (ref 0.27–4.2)
TSH SERPL DL<=0.005 MIU/L-ACNC: 19.8 UIU/ML (ref 0.27–4.2)
TSH SERPL DL<=0.005 MIU/L-ACNC: 30.1 UIU/ML (ref 0.27–4.2)
TTG IGA SER-ACNC: <2 U/ML (ref 0–3)
UROBILINOGEN UR QL: NORMAL
UROBILINOGEN UR QL: NORMAL
VIT B12 SERPL-MCNC: 505 PG/ML (ref 211–946)
VLDLC SERPL CALC-MCNC: 28.2 MG/DL
VLDLC SERPL CALC-MCNC: 28.2 MG/DL (ref 5–40)
WBC # BLD AUTO: 6.25 10*3/MM3 (ref 3.4–10.8)
WBC # BLD AUTO: 7.02 10*3/MM3 (ref 3.4–10.8)

## 2020-01-01 PROCEDURE — G0439 PPPS, SUBSEQ VISIT: HCPCS | Performed by: GENERAL PRACTICE

## 2020-01-01 PROCEDURE — 82565 ASSAY OF CREATININE: CPT

## 2020-01-01 PROCEDURE — 99214 OFFICE O/P EST MOD 30 MIN: CPT | Performed by: NURSE PRACTITIONER

## 2020-01-01 PROCEDURE — 99213 OFFICE O/P EST LOW 20 MIN: CPT | Performed by: INTERNAL MEDICINE

## 2020-01-01 PROCEDURE — 74177 CT ABD & PELVIS W/CONTRAST: CPT

## 2020-01-01 PROCEDURE — 99214 OFFICE O/P EST MOD 30 MIN: CPT | Performed by: PHYSICIAN ASSISTANT

## 2020-01-01 PROCEDURE — 36415 COLL VENOUS BLD VENIPUNCTURE: CPT | Performed by: PHYSICIAN ASSISTANT

## 2020-01-01 PROCEDURE — 36415 COLL VENOUS BLD VENIPUNCTURE: CPT | Performed by: GENERAL PRACTICE

## 2020-01-01 PROCEDURE — 72100 X-RAY EXAM L-S SPINE 2/3 VWS: CPT

## 2020-01-01 PROCEDURE — U0002 COVID-19 LAB TEST NON-CDC: HCPCS

## 2020-01-01 PROCEDURE — 81003 URINALYSIS AUTO W/O SCOPE: CPT | Performed by: NURSE PRACTITIONER

## 2020-01-01 PROCEDURE — G0009 ADMIN PNEUMOCOCCAL VACCINE: HCPCS | Performed by: GENERAL PRACTICE

## 2020-01-01 PROCEDURE — 90732 PPSV23 VACC 2 YRS+ SUBQ/IM: CPT | Performed by: GENERAL PRACTICE

## 2020-01-01 PROCEDURE — 99213 OFFICE O/P EST LOW 20 MIN: CPT | Performed by: PHYSICIAN ASSISTANT

## 2020-01-01 PROCEDURE — C9803 HOPD COVID-19 SPEC COLLECT: HCPCS

## 2020-01-01 PROCEDURE — 90686 IIV4 VACC NO PRSV 0.5 ML IM: CPT | Performed by: GENERAL PRACTICE

## 2020-01-01 PROCEDURE — G0008 ADMIN INFLUENZA VIRUS VAC: HCPCS | Performed by: GENERAL PRACTICE

## 2020-01-01 PROCEDURE — 81002 URINALYSIS NONAUTO W/O SCOPE: CPT | Performed by: NURSE PRACTITIONER

## 2020-01-01 PROCEDURE — 96160 PT-FOCUSED HLTH RISK ASSMT: CPT | Performed by: GENERAL PRACTICE

## 2020-01-01 PROCEDURE — 43239 EGD BIOPSY SINGLE/MULTIPLE: CPT | Performed by: INTERNAL MEDICINE

## 2020-01-01 PROCEDURE — U0004 COV-19 TEST NON-CDC HGH THRU: HCPCS

## 2020-01-01 PROCEDURE — 0 IOVERSOL 68 % SOLUTION: Performed by: PHYSICIAN ASSISTANT

## 2020-01-01 PROCEDURE — 25010000002 PROPOFOL 10 MG/ML EMULSION: Performed by: NURSE ANESTHETIST, CERTIFIED REGISTERED

## 2020-01-01 PROCEDURE — 99442 PR PHYS/QHP TELEPHONE EVALUATION 11-20 MIN: CPT | Performed by: GENERAL PRACTICE

## 2020-01-01 PROCEDURE — 99213 OFFICE O/P EST LOW 20 MIN: CPT | Performed by: NURSE PRACTITIONER

## 2020-01-01 PROCEDURE — 72100 X-RAY EXAM L-S SPINE 2/3 VWS: CPT | Performed by: RADIOLOGY

## 2020-01-01 PROCEDURE — 74177 CT ABD & PELVIS W/CONTRAST: CPT | Performed by: RADIOLOGY

## 2020-01-01 PROCEDURE — 88305 TISSUE EXAM BY PATHOLOGIST: CPT | Performed by: INTERNAL MEDICINE

## 2020-01-01 PROCEDURE — 82962 GLUCOSE BLOOD TEST: CPT

## 2020-01-01 RX ORDER — COLESEVELAM HYDROCHLORIDE 3.75 G/1
POWDER, FOR SUSPENSION ORAL
Qty: 90 EACH | Refills: 3 | OUTPATIENT
Start: 2020-01-01

## 2020-01-01 RX ORDER — FLUCONAZOLE 150 MG/1
150 TABLET ORAL DAILY PRN
Qty: 7 TABLET | Refills: 0 | Status: SHIPPED | OUTPATIENT
Start: 2020-01-01 | End: 2021-01-01

## 2020-01-01 RX ORDER — EMPAGLIFLOZIN, LINAGLIPTIN, METFORMIN HYDROCHLORIDE 25; 5; 1000 MG/1; MG/1; MG/1
0.5 TABLET, EXTENDED RELEASE ORAL EVERY MORNING
Qty: 90 TABLET | Refills: 3 | Status: ON HOLD
Start: 2020-01-01 | End: 2021-01-01

## 2020-01-01 RX ORDER — ROSUVASTATIN CALCIUM 20 MG/1
TABLET, COATED ORAL
Qty: 90 TABLET | Refills: 3 | Status: ON HOLD | OUTPATIENT
Start: 2020-01-01 | End: 2021-01-01

## 2020-01-01 RX ORDER — APIXABAN 5 MG/1
TABLET, FILM COATED ORAL
Qty: 180 TABLET | Refills: 3 | Status: ON HOLD | OUTPATIENT
Start: 2020-01-01 | End: 2021-01-01

## 2020-01-01 RX ORDER — AMIODARONE HYDROCHLORIDE 200 MG/1
200 TABLET ORAL DAILY
Qty: 90 TABLET | Refills: 3 | Status: ON HOLD | OUTPATIENT
Start: 2020-01-01 | End: 2021-01-01

## 2020-01-01 RX ORDER — SILDENAFIL 100 MG/1
100 TABLET, FILM COATED ORAL DAILY PRN
Qty: 30 TABLET | Refills: 2 | Status: ON HOLD | OUTPATIENT
Start: 2020-01-01 | End: 2021-01-01

## 2020-01-01 RX ORDER — LEVOTHYROXINE SODIUM 0.1 MG/1
100 TABLET ORAL DAILY
Qty: 30 TABLET | Refills: 5 | Status: SHIPPED | OUTPATIENT
Start: 2020-01-01 | End: 2020-01-01 | Stop reason: SDUPTHER

## 2020-01-01 RX ORDER — EMPAGLIFLOZIN, LINAGLIPTIN, METFORMIN HYDROCHLORIDE 25; 5; 1000 MG/1; MG/1; MG/1
1 TABLET, EXTENDED RELEASE ORAL EVERY MORNING
Qty: 90 TABLET | Refills: 3 | Status: SHIPPED | OUTPATIENT
Start: 2020-01-01 | End: 2020-01-01

## 2020-01-01 RX ORDER — SODIUM CHLORIDE 0.9 % (FLUSH) 0.9 %
10 SYRINGE (ML) INJECTION AS NEEDED
Status: DISCONTINUED | OUTPATIENT
Start: 2020-01-01 | End: 2020-01-01 | Stop reason: HOSPADM

## 2020-01-01 RX ORDER — CEFADROXIL 500 MG/1
CAPSULE ORAL
COMMUNITY
Start: 2020-01-01 | End: 2020-01-01

## 2020-01-01 RX ORDER — LIDOCAINE 50 MG/G
1 PATCH TOPICAL EVERY 24 HOURS
Qty: 90 PATCH | Refills: 1 | Status: SHIPPED | OUTPATIENT
Start: 2020-01-01 | End: 2021-01-01

## 2020-01-01 RX ORDER — FLUCONAZOLE 150 MG/1
150 TABLET ORAL DAILY
Qty: 3 TABLET | Refills: 1 | Status: SHIPPED | OUTPATIENT
Start: 2020-01-01 | End: 2020-01-01

## 2020-01-01 RX ORDER — METOPROLOL SUCCINATE 50 MG/1
TABLET, EXTENDED RELEASE ORAL
Qty: 90 TABLET | Refills: 3 | Status: ON HOLD | OUTPATIENT
Start: 2020-01-01 | End: 2021-01-01

## 2020-01-01 RX ORDER — LEVOTHYROXINE SODIUM 137 UG/1
137 TABLET ORAL DAILY
Qty: 30 TABLET | Refills: 5 | Status: SHIPPED | OUTPATIENT
Start: 2020-01-01 | End: 2020-01-01 | Stop reason: SDUPTHER

## 2020-01-01 RX ORDER — GLIPIZIDE 5 MG/1
TABLET, FILM COATED, EXTENDED RELEASE ORAL
Qty: 90 TABLET | Refills: 3 | OUTPATIENT
Start: 2020-01-01

## 2020-01-01 RX ORDER — LEVOTHYROXINE SODIUM 137 UG/1
137 TABLET ORAL DAILY
Qty: 30 TABLET | Refills: 5 | Status: ON HOLD | OUTPATIENT
Start: 2020-01-01 | End: 2021-01-01

## 2020-01-01 RX ORDER — MIDAZOLAM HYDROCHLORIDE 1 MG/ML
1 INJECTION INTRAMUSCULAR; INTRAVENOUS
Status: DISCONTINUED | OUTPATIENT
Start: 2020-01-01 | End: 2020-01-01 | Stop reason: HOSPADM

## 2020-01-01 RX ORDER — LIDOCAINE 50 MG/G
2 PATCH TOPICAL EVERY 24 HOURS
Qty: 60 PATCH | Refills: 1 | Status: SHIPPED | OUTPATIENT
Start: 2020-01-01 | End: 2020-01-01

## 2020-01-01 RX ORDER — FLUTICASONE PROPIONATE 0.05 %
CREAM (GRAM) TOPICAL
COMMUNITY
Start: 2020-01-01 | End: 2020-01-01 | Stop reason: SDUPTHER

## 2020-01-01 RX ORDER — SODIUM CHLORIDE 0.9 % (FLUSH) 0.9 %
10 SYRINGE (ML) INJECTION EVERY 12 HOURS SCHEDULED
Status: DISCONTINUED | OUTPATIENT
Start: 2020-01-01 | End: 2020-01-01 | Stop reason: HOSPADM

## 2020-01-01 RX ORDER — DOXAZOSIN 2 MG/1
TABLET ORAL
Qty: 90 TABLET | Refills: 3 | Status: SHIPPED | OUTPATIENT
Start: 2020-01-01 | End: 2021-01-01 | Stop reason: ALTCHOICE

## 2020-01-01 RX ORDER — METFORMIN HYDROCHLORIDE 500 MG/1
TABLET, EXTENDED RELEASE ORAL
Qty: 360 TABLET | Refills: 3 | Status: SHIPPED | OUTPATIENT
Start: 2020-01-01 | End: 2020-01-01

## 2020-01-01 RX ORDER — BENAZEPRIL HYDROCHLORIDE 40 MG/1
TABLET, FILM COATED ORAL
Qty: 90 TABLET | Refills: 3 | Status: ON HOLD | OUTPATIENT
Start: 2020-01-01 | End: 2021-01-01

## 2020-01-01 RX ORDER — LEVOTHYROXINE SODIUM 0.05 MG/1
50 TABLET ORAL DAILY
Qty: 90 TABLET | Refills: 3 | Status: SHIPPED | OUTPATIENT
Start: 2020-01-01 | End: 2020-01-01 | Stop reason: SDUPTHER

## 2020-01-01 RX ORDER — PROPOFOL 10 MG/ML
VIAL (ML) INTRAVENOUS AS NEEDED
Status: DISCONTINUED | OUTPATIENT
Start: 2020-01-01 | End: 2020-01-01 | Stop reason: SURG

## 2020-01-01 RX ORDER — LIDOCAINE HYDROCHLORIDE 20 MG/ML
INJECTION, SOLUTION EPIDURAL; INFILTRATION; INTRACAUDAL; PERINEURAL AS NEEDED
Status: DISCONTINUED | OUTPATIENT
Start: 2020-01-01 | End: 2020-01-01 | Stop reason: SURG

## 2020-01-01 RX ORDER — SODIUM CHLORIDE, SODIUM LACTATE, POTASSIUM CHLORIDE, CALCIUM CHLORIDE 600; 310; 30; 20 MG/100ML; MG/100ML; MG/100ML; MG/100ML
125 INJECTION, SOLUTION INTRAVENOUS CONTINUOUS
Status: DISCONTINUED | OUTPATIENT
Start: 2020-01-01 | End: 2020-01-01 | Stop reason: HOSPADM

## 2020-01-01 RX ORDER — ZOSTER VACCINE RECOMBINANT, ADJUVANTED 50 MCG/0.5
50 KIT INTRAMUSCULAR SEE ADMIN INSTRUCTIONS
Qty: 1 EACH | Refills: 1 | Status: ON HOLD | OUTPATIENT
Start: 2020-01-01 | End: 2021-01-01

## 2020-01-01 RX ORDER — CLOTRIMAZOLE AND BETAMETHASONE DIPROPIONATE 10; .64 MG/G; MG/G
CREAM TOPICAL 2 TIMES DAILY
Qty: 45 G | Refills: 0 | Status: SHIPPED | OUTPATIENT
Start: 2020-01-01 | End: 2021-01-01

## 2020-01-01 RX ORDER — GLYCOPYRROLATE 0.2 MG/ML
INJECTION INTRAMUSCULAR; INTRAVENOUS AS NEEDED
Status: DISCONTINUED | OUTPATIENT
Start: 2020-01-01 | End: 2020-01-01 | Stop reason: SURG

## 2020-01-01 RX ORDER — FLUTICASONE PROPIONATE 0.05 %
CREAM (GRAM) TOPICAL 2 TIMES DAILY
Qty: 30 G | Refills: 0 | Status: SHIPPED | OUTPATIENT
Start: 2020-01-01 | End: 2021-01-01

## 2020-01-01 RX ADMIN — LIDOCAINE HYDROCHLORIDE 60 MG: 20 INJECTION, SOLUTION EPIDURAL; INFILTRATION; INTRACAUDAL; PERINEURAL at 14:20

## 2020-01-01 RX ADMIN — PROPOFOL 75 MCG/KG/MIN: 10 INJECTION, EMULSION INTRAVENOUS at 14:20

## 2020-01-01 RX ADMIN — IOVERSOL 100 ML: 678 INJECTION INTRA-ARTERIAL; INTRAVENOUS at 09:08

## 2020-01-01 RX ADMIN — PROPOFOL 80 MG: 10 INJECTION, EMULSION INTRAVENOUS at 14:20

## 2020-01-01 RX ADMIN — GLYCOPYRROLATE 0.2 MG: 0.2 INJECTION, SOLUTION INTRAMUSCULAR; INTRAVENOUS at 14:20

## 2020-01-01 RX ADMIN — SODIUM CHLORIDE, POTASSIUM CHLORIDE, SODIUM LACTATE AND CALCIUM CHLORIDE: 600; 310; 30; 20 INJECTION, SOLUTION INTRAVENOUS at 14:17

## 2020-01-09 ENCOUNTER — TELEPHONE (OUTPATIENT)
Dept: CARDIOLOGY | Facility: CLINIC | Age: 75
End: 2020-01-09

## 2020-01-28 ENCOUNTER — OFFICE VISIT (OUTPATIENT)
Dept: FAMILY MEDICINE CLINIC | Facility: CLINIC | Age: 75
End: 2020-01-28

## 2020-01-28 DIAGNOSIS — L40.9 PSORIASIS: ICD-10-CM

## 2020-01-28 DIAGNOSIS — R79.89 ELEVATED TSH: ICD-10-CM

## 2020-01-28 DIAGNOSIS — E78.2 MIXED HYPERLIPIDEMIA: ICD-10-CM

## 2020-01-28 DIAGNOSIS — R74.8 ELEVATED LIVER ENZYMES: ICD-10-CM

## 2020-01-28 DIAGNOSIS — I10 ESSENTIAL HYPERTENSION: ICD-10-CM

## 2020-01-28 DIAGNOSIS — K21.9 GASTROESOPHAGEAL REFLUX DISEASE WITHOUT ESOPHAGITIS: ICD-10-CM

## 2020-01-28 DIAGNOSIS — R42 VERTIGO: ICD-10-CM

## 2020-01-28 DIAGNOSIS — I25.10 CORONARY ARTERY DISEASE INVOLVING NATIVE CORONARY ARTERY OF NATIVE HEART WITHOUT ANGINA PECTORIS: ICD-10-CM

## 2020-01-28 DIAGNOSIS — I48.0 PAROXYSMAL ATRIAL FIBRILLATION (HCC): Primary | ICD-10-CM

## 2020-01-28 DIAGNOSIS — Z00.00 HEALTHCARE MAINTENANCE: ICD-10-CM

## 2020-01-28 DIAGNOSIS — E11.42 TYPE 2 DIABETES MELLITUS WITH PERIPHERAL NEUROPATHY (HCC): ICD-10-CM

## 2020-01-28 DIAGNOSIS — E53.8 B12 DEFICIENCY: ICD-10-CM

## 2020-01-28 PROCEDURE — 99214 OFFICE O/P EST MOD 30 MIN: CPT | Performed by: GENERAL PRACTICE

## 2020-01-28 RX ORDER — MAGNESIUM CARB/ALUMINUM HYDROX 105-160MG
TABLET,CHEWABLE ORAL
COMMUNITY
Start: 2020-01-16 | End: 2021-01-01

## 2020-01-28 RX ORDER — PROPAFENONE HYDROCHLORIDE 225 MG/1
225 TABLET, FILM COATED ORAL EVERY 8 HOURS
Qty: 270 TABLET | Refills: 3 | Status: SHIPPED | OUTPATIENT
Start: 2020-01-28

## 2020-01-28 RX ORDER — BISACODYL 5 MG
TABLET, DELAYED RELEASE (ENTERIC COATED) ORAL
COMMUNITY
Start: 2020-01-16 | End: 2021-01-01

## 2020-01-28 NOTE — PROGRESS NOTES
Subjective   Aaron Delgado is a 74 y.o. male.     History of Present Illness     Dizziness  He gives an approximate 1 year history of intermittent dizziness.  This has been relatively stable since last here.  The dizziness is described as a sense of movement that occurs with changes in position and resolves promptly with rest.  To date he has had no associated symptoms and specifically denies any hearing loss, tinnitus, or difficulty with his eye hand coordination.  MRI of the brain performed on 2/18/2019 was unremarkable.  He underwent an ENT assessment on 12/17/19 and was scheduled for [facility] with a return on 2/18/20    Atrial Fibrillation  Patient has a history of atrial fibrillation. The patient denies palpitations, lightheadedness, diaphoresis or syncope. The patient has a past history of CAD, DM, HTN and hyperlipidemia.  He remains on apixaban. He denies  bleeding.  He continues to be followed by cardiology and underwent a reassessment on 12/17/19 with the addition of amlodipine 10 qd.    Coronary Artery Disease  S/P CABG.  He has noted a persistent improvement in his lower extremity edema since last here.  He continues to deny any chest pain or palpitations and there is no history of any lightheadedness, or shortness of breath.     Type 2 Diabetes Mellitus  He admits to mild numbness and tingling of both feet.  There is no history of any polyuria, polydipsia, skin breakdown, or hypoglycemia.  He remains on metformin, sitagliptin, and empagliflozin. His last diabetic eye exam was within 1 year.    Lab Results   Component Value Date    HGBA1C 6.90 (H) 12/13/2019     Hyperlipidemia  He remains on simvastatin, and ezetimibewith no apparent side effects.  Lab Results   Component Value Date    CHLPL 98 12/13/2019    TRIG 156 (H) 12/13/2019    HDL 28 (L) 12/13/2019    LDL 39 12/13/2019     Hypertension  He remains on benazepril, metoprololm amlodipine, and doxazosin.  His blood pressure has been much better since last  here.  Amlodipine was previously discontinued due to increased lower extremity edema. He has not noted any change in his lower extremity swelling this time as of yet. Chlorthalidone was discontinued due to hyponatremia  Lab Results   Component Value Date    GLUCOSE 156 (H) 12/10/2019    BUN 13 12/13/2019    CREATININE 1.27 12/13/2019    EGFRIFNONA 56 (L) 12/13/2019    EGFRIFAFRI 67 12/13/2019    BCR 10.2 12/13/2019    K 4.7 12/13/2019    CO2 24.9 12/13/2019    CALCIUM 9.5 12/13/2019    PROTENTOTREF 6.8 12/13/2019    ALBUMIN 3.90 12/13/2019    LABIL2 1.3 12/13/2019    AST 88 (H) 12/13/2019    ALT 62 (H) 12/13/2019     The following portions of the patient's history were reviewed and updated as appropriate: allergies, current medications, past medical history, past social history and problem list.    Review of Systems   Constitutional: Negative for chills, fatigue and fever.   HENT: Negative for congestion, ear pain, rhinorrhea, sneezing, sore throat and voice change.    Eyes: Negative for visual disturbance.   Respiratory: Negative for cough, shortness of breath and wheezing.    Cardiovascular: Positive for leg swelling. Negative for chest pain and palpitations.   Gastrointestinal: Negative for abdominal pain, blood in stool, constipation, diarrhea, nausea and vomiting.   Endocrine: Negative for polydipsia and polyuria.   Genitourinary: Negative for difficulty urinating, dysuria, frequency, hematuria and urgency.   Musculoskeletal: Positive for arthralgias. Negative for back pain, joint swelling, myalgias and neck pain.   Neurological: Positive for dizziness. Negative for weakness, numbness and headaches.   Psychiatric/Behavioral: Negative for dysphoric mood and sleep disturbance. The patient is not nervous/anxious.      Objective   Physical Exam   Constitutional: He is oriented to person, place, and time. He appears well-developed and well-nourished. He is cooperative. He does not have a sickly appearance. No  distress.   Bright and in good spirits. No apparent distress. No pallor, jaundice, diaphoresis, or cyanosis.   HENT:   Head: Atraumatic.   Right Ear: Tympanic membrane, external ear and ear canal normal.   Left Ear: Tympanic membrane, external ear and ear canal normal.   Mouth/Throat: Oropharynx is clear and moist.   Eyes: Pupils are equal, round, and reactive to light. EOM are normal. No scleral icterus.   Neck: No JVD present. Carotid bruit is not present. No tracheal deviation present. No thyromegaly present.   Cardiovascular: Normal rate, regular rhythm, S1 normal, S2 normal, normal heart sounds and intact distal pulses. Exam reveals no gallop.   No murmur heard.  Pulmonary/Chest: Breath sounds normal. He has no wheezes. He has no rales.   Musculoskeletal: He exhibits no deformity.   No peripheral joint redness or warmth.     Vascular Status -  His right foot exhibits no edema (compression hose). His left foot exhibits no edema (compression hose).  Lymphadenopathy:        Head (right side): No submandibular adenopathy present.        Head (left side): No submandibular adenopathy present.     He has no cervical adenopathy.   Neurological: He is alert and oriented to person, place, and time. No cranial nerve deficit. He exhibits normal muscle tone. Coordination normal.   Skin: Skin is warm and dry. No rash noted. He is not diaphoretic. No pallor. Nails show no clubbing.   Psychiatric: He has a normal mood and affect. His behavior is normal.     Assessment/Plan   Problems Addressed this Visit        Cardiovascular and Mediastinum    CAD (coronary artery disease)  Reminded regarding the importance of risk factor modification.  Continue current medication  Follow up with cardiology     Mixed hyperlipidemia  Encouraged to continue to work on his diet and exercise plan.  Continue current medication    Essential hypertension   Hypertension: improved. Evidence of target organ damage: coronary artery disease.   As  above  If doing well between now and his return will likely discontinue doxazosin. If he should develop increasing edema will discontinue amlodipine and titrate doxazosin    Paroxysmal atrial fibrillation (CMS/HCC)    Rate control is appropriate.. Patient is anticoagulated.   Avoid caffeine.  Avoid oral decongestants.  Continue current medication.    Relevant Medications    propafenone (RYTHMOL) 225 MG tablet       Digestive    Gastroesophageal reflux disease without esophagitis    B12 deficiency  Continue supplementation with monitoring.       Endocrine    Type 2 diabetes mellitus with peripheral neuropathy (CMS/HCC)  Diabetes mellitus Type II, under excellent control.   Encouraged to continue to pursue ADA diet  Encouraged aerobic exercise.  Continue current medication  Updated labs will be drawn at his return.       Musculoskeletal and Integument    Psoriasis       Other    Healthcare maintenance  Reminded to obtain hepatitis A#2    Elevated liver enzymes  Will continue to monitor    Vertigo  Follow up with ENT    Elevated TSH  Will continue to monitor

## 2020-01-29 VITALS
SYSTOLIC BLOOD PRESSURE: 140 MMHG | OXYGEN SATURATION: 96 % | RESPIRATION RATE: 14 BRPM | DIASTOLIC BLOOD PRESSURE: 76 MMHG | BODY MASS INDEX: 30.66 KG/M2 | WEIGHT: 207 LBS | HEIGHT: 69 IN | TEMPERATURE: 98.6 F | HEART RATE: 59 BPM

## 2020-03-12 RX ORDER — AMLODIPINE BESYLATE 10 MG/1
10 TABLET ORAL EVERY EVENING
Qty: 30 TABLET | Refills: 5 | Status: SHIPPED | OUTPATIENT
Start: 2020-03-12 | End: 2021-01-01

## 2020-05-22 PROBLEM — B37.9 CANDIDIASIS: Status: ACTIVE | Noted: 2020-01-01

## 2020-05-22 NOTE — PROGRESS NOTES
History of Present Illness   Aaron Delgado is a 74 y.o. male who presents to the office today c/o dysuria which started two days ago.       Dysuria   The current episode started in the past 7 days. The problem occurs every urination. The quality of the pain is described as burning. There has been no fever. He is sexually active. There is no history of pyelonephritis. Pertinent negatives include no chills, discharge, frequency, nausea or urgency. Aaron does have DM, type 2 which is treated with Glyxambi and Metformin. He has had a similar episode which was treated as candidiasis.     The following portions of the patient's history were reviewed and updated as appropriate: allergies, current medications, past family history, past medical history, past social history, past surgical history and problem list.    Current Outpatient Medications:   •  amiodarone (PACERONE) 200 MG tablet, Take 1 tablet by mouth Daily., Disp: 90 tablet, Rfl: 3  •  amLODIPine (NORVASC) 10 MG tablet, Take 1 tablet by mouth Every Evening., Disp: 30 tablet, Rfl: 5  •  apixaban (ELIQUIS) 5 MG tablet tablet, Take 1 tablet by mouth Every 12 (Twelve) Hours., Disp: 180 tablet, Rfl: 3  •  benazepril (LOTENSIN) 40 MG tablet, Take 1 tablet by mouth Daily., Disp: 90 tablet, Rfl: 3  •  BISACODYL 5 MG EC tablet, TK 4 TS PO FOR1 DOSE PRF CONSTIPATION, Disp: , Rfl:   •  doxazosin (CARDURA) 2 MG tablet, 1/2 po nightly for 1 week then 1 po nightly afterward, Disp: 90 tablet, Rfl: 3  •  Empagliflozin-linaGLIPtin (GLYXAMBI) 25-5 MG tablet, Take 1 tablet by mouth Every Morning., Disp: 90 tablet, Rfl: 3  •  fluticasone (CUTIVATE) 0.05 % cream, APPLY A PEA SIZE AMOUNT 2 TO 3 TIMES DAILY, Disp: , Rfl:   •  LORazepam (ATIVAN) 0.5 MG tablet, Take 1 tablet by mouth Every 8 (Eight) Hours As Needed (DIZZINESS)., Disp: 10 tablet, Rfl: 0  •  magnesium citrate 1.745 GM/30ML solution solution,  ML PO FOR 1 DOSE, Disp: , Rfl:   •  metFORMIN ER (GLUCOPHAGE-XR) 500 MG  24 hr tablet, Take 4 tablets by mouth Daily With Breakfast., Disp: 360 tablet, Rfl: 3  •  metoprolol succinate XL (TOPROL-XL) 50 MG 24 hr tablet, Take 1 tablet by mouth Every Night., Disp: 90 tablet, Rfl: 3  •  ondansetron (ZOFRAN) 4 MG tablet, Take 1 tablet by mouth Every 8 (Eight) Hours As Needed for Vomiting., Disp: 30 tablet, Rfl: 0  •  propafenone (RYTHMOL) 225 MG tablet, Take 1 tablet by mouth Every 8 (Eight) Hours., Disp: 270 tablet, Rfl: 3  •  rosuvastatin (CRESTOR) 20 MG tablet, Take 1 tablet by mouth Every Night., Disp: 90 tablet, Rfl: 3  •  sildenafil (VIAGRA) 100 MG tablet, Take 1 tablet by mouth Daily As Needed for erectile dysfunction., Disp: 30 tablet, Rfl: 0  •  vitamin B-12 (CYANOCOBALAMIN) 1000 MCG tablet, Take 1 tablet by mouth Daily., Disp: 30 tablet, Rfl: 5  •  clotrimazole-betamethasone (LOTRISONE) 1-0.05 % cream, Apply  topically to the appropriate area as directed 2 (Two) Times a Day., Disp: 45 g, Rfl: 0  •  fluconazole (DIFLUCAN) 150 MG tablet, Take 1 tablet by mouth Daily for 3 doses., Disp: 3 tablet, Rfl: 1    Current Facility-Administered Medications:   •  cyanocobalamin injection 1,000 mcg, 1,000 mcg, Intramuscular, Q28 Days, Dillan Nur MD, 1,000 mcg at 06/06/19 1359    No Known Allergies    Review of Systems   Constitutional: Negative for activity change, appetite change, chills, diaphoresis, fatigue and fever.   Eyes: Negative for visual disturbance.   Respiratory: Negative for cough, shortness of breath and wheezing.    Cardiovascular: Positive for leg swelling (Intermittent).   Gastrointestinal: Negative for abdominal pain, nausea and vomiting.   Endocrine: Negative for polydipsia, polyphagia and polyuria.   Genitourinary: Positive for dysuria and penile pain. Negative for decreased urine volume, difficulty urinating, discharge, flank pain, frequency, genital sores, hematuria, penile swelling, scrotal swelling, testicular pain and urgency.   Musculoskeletal: Positive  "for arthralgias (Chronic) and gait problem (Chronic).   Hematological: Negative for adenopathy.     Visit Vitals  /70 (BP Location: Right arm, Patient Position: Sitting, Cuff Size: Adult)   Pulse 64   Temp 99.9 °F (37.7 °C) (Temporal)   Resp 14   Ht 175.3 cm (69\")   Wt 92.5 kg (204 lb)   SpO2 95%   BMI 30.13 kg/m²     Physical Exam   Constitutional: He is oriented to person, place, and time. He appears well-developed and well-nourished. No distress.   HENT:   Head: Normocephalic.   Right Ear: Tympanic membrane and ear canal normal.   Left Ear: Tympanic membrane and ear canal normal.   Nose: Nose normal.   Eyes: Pupils are equal, round, and reactive to light. Conjunctivae and EOM are normal. Right eye exhibits no discharge. Left eye exhibits no discharge. No scleral icterus.   Neck: Neck supple. No tracheal tenderness present.   Cardiovascular: Normal rate, regular rhythm and normal heart sounds. Exam reveals no friction rub.   No murmur heard.  Pulmonary/Chest: Effort normal and breath sounds normal. No respiratory distress. He has no wheezes. He has no rales.   Abdominal: Soft. There is no tenderness. There is no guarding.   Musculoskeletal: He exhibits no edema.   Lymphadenopathy:     He has no cervical adenopathy.   Neurological: He is alert and oriented to person, place, and time.   Skin: Skin is warm and dry. Capillary refill takes less than 2 seconds. No rash noted. No erythema.   Psychiatric: He has a normal mood and affect. His behavior is normal. Judgment and thought content normal.   Nursing note and vitals reviewed.      Lab Results (last 24 hours)     Procedure Component Value Units Date/Time    POCT urinalysis dipstick, manual [[id number]]  (Abnormal) Collected:  05/22/20 1424    Specimen:  Urine Updated:  05/22/20 1426     Color Yellow     Clarity, UA Turbid     Glucose, UA 3+ mg/dL      Bilirubin Negative     Ketones, UA Negative     Specific Gravity  1.020     Blood, UA Negative     pH, Urine " 6.0     Protein, POC Negative mg/dL      Urobilinogen, UA Normal     Leukocytes Negative     Nitrite, UA Negative          Assessment/Plan   Diagnoses and all orders for this visit:    Dysuria  Comments:  Findings and recommendations discussed with Aaron. Reviewed his Urinalysis and treatment options.  Orders:  -     POCT urinalysis dipstick, manual    Candidiasis  Comments:  Will prescribe Diflucan and Lotrisone cream for him.   Orders:  -     fluconazole (DIFLUCAN) 150 MG tablet; Take 1 tablet by mouth Daily for 3 doses.  -     clotrimazole-betamethasone (LOTRISONE) 1-0.05 % cream; Apply  topically to the appropriate area as directed 2 (Two) Times a Day.    Type 2 diabetes mellitus with peripheral neuropathy (CMS/HCC)  Comments:  Reinforced the importance of glycemic control and monitoring of his symptoms.    Findings and recommendations discussed with Aaron. Reviewed his Urinalysis and treatment options.Will prescribe Diflucan and Lotrisone cream for him. Reinforced the importance of glycemic control and monitoring of his symptoms. S/S of concern reviewed and if occur to seek further medical evaluation or if symptoms do not improve.            This document has been electronically signed by AMARILIS Stewart, EDEN-BC, CDE  May 22, 2020 15:11

## 2020-05-22 NOTE — PATIENT INSTRUCTIONS
Type 2 Diabetes Mellitus, Self Care, Adult  When you have type 2 diabetes (type 2 diabetes mellitus), you must make sure your blood sugar (glucose) stays in a healthy range. You can do this with:  · Nutrition.  · Exercise.  · Lifestyle changes.  · Medicines or insulin, if needed.  · Support from your doctors and others.  How to stay aware of blood sugar    · Check your blood sugar level every day, as often as told.  · Have your A1c (hemoglobin A1c) level checked two or more times a year. Have it checked more often if your doctor tells you to.  Your doctor will set personal treatment goals for you. Generally, you should have these blood sugar levels:  · Before meals (preprandial):  mg/dL (4.4-7.2 mmol/L).  · After meals (postprandial): below 180 mg/dL (10 mmol/L).  · A1c level: less than 7%.  How to manage high and low blood sugar  Signs of high blood sugar  High blood sugar is called hyperglycemia. Know the signs of high blood sugar. Signs may include:  · Feeling:  ? Thirsty.  ? Hungry.  ? Very tired.  · Needing to pee (urinate) more than usual.  · Blurry vision.  Signs of low blood sugar  Low blood sugar is called hypoglycemia. This is when blood sugar is at or below 70 mg/dL (3.9 mmol/L). Signs may include:  · Feeling:  ? Hungry.  ? Worried or nervous (anxious).  ? Sweaty and clammy.  ? Confused.  ? Dizzy.  ? Sleepy.  ? Sick to your stomach (nauseous).  · Having:  ? A fast heartbeat.  ? A headache.  ? A change in your vision.  ? Jerky movements that you cannot control (seizure).  ? Tingling or no feeling (numbness) around your mouth, lips, or tongue.  · Having trouble with:  ? Moving (coordination).  ? Sleeping.  ? Passing out (fainting).  ? Getting upset easily (irritability).  Treating low blood sugar  To treat low blood sugar, eat or drink something sugary right away. If you can think clearly and swallow safely, follow the 15:15 rule:  · Take 15 grams of a fast-acting carb (carbohydrate). Talk with your  doctor about how much you should take.  · Some fast-acting carbs are:  ? Sugar tablets (glucose pills). Take 3-4 pills.  ? 6-8 pieces of hard candy.  ? 4-6 oz (120-150 mL) of fruit juice.  ? 4-6 oz (120-150 mL) of regular (not diet) soda.  ? 1 Tbsp (15 mL) honey or sugar.  · Check your blood sugar 15 minutes after you take the carb.  · If your blood sugar is still at or below 70 mg/dL (3.9 mmol/L), take 15 grams of a carb again.  · If your blood sugar does not go above 70 mg/dL (3.9 mmol/L) after 3 tries, get help right away.  · After your blood sugar goes back to normal, eat a meal or a snack within 1 hour.  Treating very low blood sugar  If your blood sugar is at or below 54 mg/dL (3 mmol/L), you have very low blood sugar (severe hypoglycemia). This is an emergency. Do not wait to see if the symptoms will go away. Get medical help right away. Call your local emergency services (911 in the [place]  If you have very low blood sugar and you cannot eat or drink, you may need a glucagon shot (injection). A family member or friend should learn how to check your blood sugar and how to give you a glucagon shot. Ask your doctor if you need to have a glucagon shot kit at home.  Follow these instructions at home:  Medicine  · Take insulin and diabetes medicines as told.  · If your doctor says you should take more or less insulin and medicines, do this exactly as told.  · Do not run out of insulin or medicines.  Having diabetes can raise your risk for other long-term conditions. These include heart disease and kidney disease. Your doctor may prescribe medicines to help you not have these problems.  Food    · Make healthy food choices. These include:  ? Chicken, fish, egg whites, and beans.  ? Oats, whole wheat, bulgur, brown rice, quinoa, and millet.  ? Fresh fruits and vegetables.  ? Low-fat dairy products.  ? Nuts, avocado, olive oil, and canola oil.  · Meet with a  (dietitian). He or she can help you make an  eating plan that is right for you.  · Follow instructions from your doctor about what you cannot eat or drink.  · Drink enough fluid to keep your pee (urine) pale yellow.  · Keep track of carbs that you eat. Do this by reading food labels and learning food serving sizes.  · Follow your sick day plan when you cannot eat or drink normally. Make this plan with your doctor so it is ready to use.  Activity  · Exercise 3 or more times a week.  · Do not go more than 2 days without exercising.  · Talk with your doctor before you start a new exercise. Your doctor may need to tell you to change:  ? How much insulin or medicines you take.  ? How much food you eat.  Lifestyle  · Do not use any tobacco products. These include cigarettes, chewing tobacco, and e-cigarettes. If you need help quitting, ask your doctor.  · Ask your doctor how much alcohol is safe for you.  · Learn to deal with stress. If you need help with this, ask your doctor.  Body care    · Stay up to date with your shots (immunizations).  · Have your eyes and feet checked by a doctor as often as told.  · Check your skin and feet every day. Check for cuts, bruises, redness, blisters, or sores.  · Brush your teeth and gums two times a day. Floss one or more times a day.  · Go to the dentist one or more times every 6 months.  · Stay at a healthy weight.  General instructions  · Take over-the-counter and prescription medicines only as told by your doctor.  · Share your diabetes care plan with:  ? Your work or school.  ? People you live with.  · Carry a card or wear jewelry that says you have diabetes.  · Keep all follow-up visits as told by your doctor. This is important.  Questions to ask your doctor  · Do I need to meet with a diabetes educator?  · Where can I find a support group for people with diabetes?  Where to find more information  To learn more about diabetes, visit:  · American Diabetes Association: www.diabetes.org  · American Association of Diabetes  Educators: www.diabeteseducator.org  Summary  · When you have type 2 diabetes, you must make sure your blood sugar (glucose) stays in a healthy range.  · Check your blood sugar every day, as often as told.  · Having diabetes can raise your risk for other conditions. Your doctor may prescribe medicines to help you not have these problems.  · Keep all follow-up visits as told by your doctor. This is important.  This information is not intended to replace advice given to you by your health care provider. Make sure you discuss any questions you have with your health care provider.  Document Released: 04/10/2017 Document Revised: 06/10/2019 Document Reviewed: 01/20/2017  Elsevier Patient Education © 2020 [employer]

## 2020-06-09 NOTE — TELEPHONE ENCOUNTER
Pt is aware of this information.         ----- Message from Dillan Nur MD sent at 6/9/2020 10:45 AM EDT -----  He could stop it for a week and then resume at 1/2 tablet daily    ----- Message -----  From: Cele Reed MA  Sent: 6/9/2020  10:38 AM EDT  To: Dillan Nur MD    Patient called and stated that his GLYXAMBI is giving him a terrible yeast infection. He got medicine to clear it up the first time and now he said its gave him another yeast infection. He wanted to know if you could change the medicine to something else that wont cause that.

## 2020-06-24 NOTE — PROGRESS NOTES
[facility] CARDIOLOGY  [address] [facility] [address]  [place] [address]  Phone: [phone]  Fax: [phone]    06/24/2020    Chief Complaint   Patient presents with   • Coronary Artery Disease        History:   Aaron Delgado is a 74 y.o. male seen in followup, He reports no new symptoms. He denies CP or dyspnea. He is walking well with no difficulty.   No further episodes of palpitation noted.    Past Medical History:   Diagnosis Date   • A-fib (CMS/HCC)    • CHF (congestive heart failure) (CMS/[facility])    • Coronary artery disease    • Hyperlipidemia    • Hypertension        Past Surgical History:   Procedure Laterality Date   • CORONARY ARTERY BYPASS GRAFT          Past Social History:  Social History     Socioeconomic History   • Marital status:      Spouse name: Not on file   • Number of children: Not on file   • Years of education: Not on file   • Highest education level: Not on file   Occupational History   • Occupation: Retired   Tobacco Use   • Smoking status: Never Smoker   • Smokeless tobacco: Never Used   Substance and Sexual Activity   • Alcohol use: No     Frequency: Never   • Drug use: No   • Sexual activity: Defer       Past Family History:  History reviewed. No pertinent family history.    Review of Systems:   Please see HPI  Constitution: No chills, no rigors, no unexplained weight loss or weight gain  Eyes:  No diplopia, no blurred vision, no loss of vision, conjunctiva is pink and sclera is anicteric  ENT:  No tinnitus, no otorrhea, no epistaxis, no sore throat   Respiratory: No cough, no hemoptysis  Cardiovascular: see HPI  Gastrointestinal: No nausea, no vomiting, no hematemesis, no diarrhea or constipation, no melena  Genitourinary: No frequency of dysuria no hematuria  Integument: No pruritis and  no skin rash  Hematologic / Lymphatic: No excessive bleeding, easy bruising, fatigue, lymphadenopathy and petechiae  Musculoskeletal: No joint pain, joint stiffness,  joint swelling, muscle pain, muscle weakness and neck pain  Neurological: No dizziness, headaches, light headedness, seizures and vertigo  Endocrine: No frequent urination and nocturia, temperature intolerance, weight gain, unintended and weight loss, unintended      Current Outpatient Medications   Medication Sig Dispense Refill   • amiodarone (PACERONE) 200 MG tablet Take 1 tablet by mouth Daily. 90 tablet 3   • amLODIPine (NORVASC) 10 MG tablet Take 1 tablet by mouth Every Evening. 30 tablet 5   • apixaban (ELIQUIS) 5 MG tablet tablet Take 1 tablet by mouth Every 12 (Twelve) Hours. 180 tablet 3   • benazepril (LOTENSIN) 40 MG tablet Take 1 tablet by mouth Daily. 90 tablet 3   • BISACODYL 5 MG EC tablet TK 4 TS PO FOR1 DOSE PRF CONSTIPATION     • cefadroxil (DURICEF) 500 MG capsule TK ONE C PO  BID FOR 10 DAYS     • clotrimazole-betamethasone (LOTRISONE) 1-0.05 % cream Apply  topically to the appropriate area as directed 2 (Two) Times a Day. 45 g 0   • doxazosin (CARDURA) 2 MG tablet 1/2 po nightly for 1 week then 1 po nightly afterward 90 tablet 3   • Empagliflozin-linaGLIPtin (GLYXAMBI) 25-5 MG tablet Take 1 tablet by mouth Every Morning. 90 tablet 3   • fluticasone (CUTIVATE) 0.05 % cream APPLY A PEA SIZE AMOUNT 2 TO 3 TIMES DAILY     • LORazepam (ATIVAN) 0.5 MG tablet Take 1 tablet by mouth Every 8 (Eight) Hours As Needed (DIZZINESS). 10 tablet 0   • magnesium citrate 1.745 GM/30ML solution solution  ML PO FOR 1 DOSE     • metFORMIN ER (GLUCOPHAGE-XR) 500 MG 24 hr tablet Take 4 tablets by mouth Daily With Breakfast. 360 tablet 3   • metoprolol succinate XL (TOPROL-XL) 50 MG 24 hr tablet Take 1 tablet by mouth Every Night. (Patient taking differently: Take 100 mg by mouth Every Night.) 90 tablet 3   • ondansetron (ZOFRAN) 4 MG tablet Take 1 tablet by mouth Every 8 (Eight) Hours As Needed for Vomiting. 30 tablet 0   • propafenone (RYTHMOL) 225 MG tablet Take 1 tablet by mouth Every 8 (Eight) Hours. 270  tablet 3   • rosuvastatin (CRESTOR) 20 MG tablet Take 1 tablet by mouth Every Night. 90 tablet 3   • sildenafil (VIAGRA) 100 MG tablet Take 1 tablet by mouth Daily As Needed for erectile dysfunction. 30 tablet 0   • vitamin B-12 (CYANOCOBALAMIN) 1000 MCG tablet Take 1 tablet by mouth Daily. 30 tablet 5     Current Facility-Administered Medications   Medication Dose Route Frequency Provider Last Rate Last Dose   • cyanocobalamin injection 1,000 mcg  1,000 mcg Intramuscular Q28 Days Dillan Nur MD   1,000 mcg at 06/06/19 1359        No Known Allergies    Objective:  Vitals:    06/24/20 1339   BP: 124/68   Pulse: 57   SpO2: 93%     Comfortable NAD  PERRL, conjunctiva clear  Neck supple, no JVD or thyromegaly appreciated  S1/S2 RRR, no m/r/g  Lungs CTA B, normal effort  Abdomen S/NT/ND (+) BS, no HSM appreciated  Extremities warm, no clubbing, cyanosis, or edema  Normal gait  No visible or palpable skin lesions  A/Ox4, mood and affect appropriate  Pulse exam:    Feet are warm bilateral  1+ edema bilateral wearing stockings  Capillary refill is normal  No evidence of ulceration or color change of the toes  PULSES  Right DP and PT are 2+ and Left DP and PT are 2+    DATA:      Results for orders placed during the hospital encounter of 10/01/19   Adult Transthoracic Echo Complete W/ Cont if Necessary Per Protocol    Narrative · Normal left ventricular cavity size and wall thickness noted. All left   ventricular wall segments contract normally.  · Left ventricular diastolic dysfunction (grade I) consistent with   impaired relaxation.  · Estimated EF appears to be in the range of 61 - 65%.  · The aortic valve is structurally normal. No aortic valve regurgitation   is present. No aortic valve stenosis is present.  · The mitral valve is abnormal in structure. Mild thickening and   calcification of the anterior and posterior mitral leaflets.. Mild mitral   valve regurgitation is present. No significant mitral valve  stenosis is   present.  · The tricuspid valve is normal. No evidence of tricuspid valve stenosis   is present. No tricuspid valve regurgitation is present.  · There is no evidence of pericardial effusion.              Procedures   Cardiovascular system:  BP Readings from Last 3 Encounters:   06/24/20 124/68   05/22/20 130/70   01/28/20 140/76     EKG  No orders to display     ECHO  Results for orders placed during the hospital encounter of 10/01/19   Adult Transthoracic Echo Complete W/ Cont if Necessary Per Protocol    Narrative · Normal left ventricular cavity size and wall thickness noted. All left   ventricular wall segments contract normally.  · Left ventricular diastolic dysfunction (grade I) consistent with   impaired relaxation.  · Estimated EF appears to be in the range of 61 - 65%.  · The aortic valve is structurally normal. No aortic valve regurgitation   is present. No aortic valve stenosis is present.  · The mitral valve is abnormal in structure. Mild thickening and   calcification of the anterior and posterior mitral leaflets.. Mild mitral   valve regurgitation is present. No significant mitral valve stenosis is   present.  · The tricuspid valve is normal. No evidence of tricuspid valve stenosis   is present. No tricuspid valve regurgitation is present.  · There is no evidence of pericardial effusion.        Last Venous Blood Gas  No results found for: PHVEN, YVS2YUN, PO2VEN, YPY1LJP, BEVEN, Y1QFEJVCX  Last Digoxin level      No results found for: DIGOXIN  Lipid panel   Triglycerides   Date Value Ref Range Status   12/13/2019 156 (H) 0 - 150 mg/dL Final     HDL Cholesterol   Date Value Ref Range Status   12/13/2019 28 (L) 40 - 60 mg/dL Final     LDL Cholesterol    Date Value Ref Range Status   12/13/2019 39 0 - 100 mg/dL Final         A/P:  PAF currently appears to be in sinus rhythm  Stable Essential HTN  Normal LV function  Stable CAD post CABG remote  DM2    Plan  Continue with amiodarone  therapy  Current regimen is adequate with no changes  FU in 6 months    Patient's Body mass index is 30.13 kg/m². BMI is above normal parameters. Recommendations include: nutrition counseling.       No diagnosis found.     Thank you for allowing me to participate in the care of Aaronjakob Delgado. Feel free to contact me directly with any further questions or concerns.        Director, Cardiac Cath Lab

## 2020-06-25 PROBLEM — B37.9 CANDIDIASIS: Status: RESOLVED | Noted: 2020-01-01 | Resolved: 2020-01-01

## 2020-06-25 NOTE — PROGRESS NOTES
Subjective   Aaron Delgado is a 74 y.o. male.     History of Present Illness     This visit has been rescheduled as a phone visit to comply with patient safety concerns in accordance with CDC recommendations. Total time of discussion was 11 minutes.    You have chosen to receive care through a telephone visit. Do you consent to use a telephone visit for your medical care today? Yes    Cellulitis  Started on cefadroxil while in [place] several days ago for increasing redness and pain about the posterior calf.  There has been a gradual improvement in his symptoms.  There is no history of any swelling and he denies any chest pain, shortness of breath, hemoptysis, fever or chills.  He has not experienced any side effects    Type 2 Diabetes Mellitus  He admits to mild numbness and tingling of both feet.  There is no history of any polyuria, polydipsia, skin breakdown, or hypoglycemia.  He remains on metformin, linagliptin, and empagliflozin (together as glyxambi).  He has resumed the latter at a dose of 25/5 one half daily and has not had a recurrence of his dysuria or penile pain even with the recent antibiotic.  His last diabetic eye exam was within 1 year.      Hyperlipidemia  He remains on simvastatin, and ezetimibewith no apparent side effects.    Hypertension  He remains on benazepril, metoprololm amlodipine, and doxazosin.  His blood pressure has been much better since last here.  Amlodipine was previously discontinued due to increased lower extremity edema. He has not noted any change in his lower extremity swelling this time as of yet. Chlorthalidone was discontinued due to hyponatremia    Atrial Fibrillation  Patient has a history of atrial fibrillation. The patient denies palpitations, lightheadedness, diaphoresis or syncope. The patient has a past history of CAD, DM, HTN and hyperlipidemia.  He remains on apixaban. He denies  bleeding.  He continues to be followed by cardiology and underwent a  reassessment yesterday with no apparent changes made in his management    Coronary Artery Disease  S/P CABG.  He has noted a continued mprovement in his lower extremity edema since last here.  He continues to deny any chest pain or palpitations and there is no history of any lightheadedness, or shortness of breath.     Dizziness  He gives a more than 1 year history of intermittent dizziness.  This has been relatively stable since last here.  The dizziness is described as a sense of movement that occurs with changes in position and resolves promptly with rest.  To date he has had no associated symptoms and specifically denies any hearing loss, tinnitus, or difficulty with his eye hand coordination.  MRI of the brain performed on 2/18/2019 was unremarkable.  He has undergone an ENT assessment     Labs  Liver enzymes performed on 6/20/2020 returned with an ALP of 402, ALT of 100, and an AST of 125.  Previous iron studies and hepatitis serology were unremarkable as well as an ultrasound of the abdomen.    The following portions of the patient's history were reviewed and updated as appropriate: allergies, current medications, past medical history, past social history and problem list.    Review of Systems   Constitutional: Negative for chills, fatigue and fever.   HENT: Negative for congestion, ear pain, rhinorrhea, sneezing, sore throat and voice change.    Eyes: Negative for visual disturbance.   Respiratory: Negative for cough, shortness of breath and wheezing.    Cardiovascular: Negative for chest pain, palpitations and leg swelling.   Gastrointestinal: Negative for abdominal pain, blood in stool, constipation, diarrhea, nausea and vomiting.   Endocrine: Negative for polydipsia and polyuria.   Genitourinary: Negative for dysuria, frequency, hematuria, penile pain, penile swelling and urgency.   Musculoskeletal: Positive for arthralgias. Negative for back pain and myalgias.   Skin: Positive for rash.   Neurological:  Positive for dizziness. Negative for weakness, numbness and headaches.     Objective   Physical Exam   Constitutional:   Alert and oriented.  Bright and in good spirits.  No apparent distress or shortness of breath.     Assessment/Plan   Problems Addressed this Visit        Cardiovascular and Mediastinum    CAD (coronary artery disease)  Reminded regarding the importance of risk factor modification.  Continue current medication  Follow up with cardiology     Essential hypertension  Encouraged to continue to work on his diet and exercise plan.  Continue current medication    Mixed hyperlipidemia  As above.   Continue current medication.    Relevant Orders    Lipid Panel    TSH    Paroxysmal atrial fibrillation (CMS/HCC)     Patient is anticoagulated.   Avoid caffeine.  Avoid oral decongestants.  Continue current medication.   Follow up with cardiology.    Relevant Orders    TSH       Digestive    B12 deficiency  Continue supplementation with monitoring.    Relevant Orders    Vitamin B12    Gastroesophageal reflux disease without esophagitis       Endocrine    Type 2 diabetes mellitus with peripheral neuropathy (CMS/HCC)  Diabetes mellitus Type II, under unknown control.   Encouraged to continue to pursue ADA diet  Encouraged aerobic exercise.  Continue current medication  Scheduled for updated labs    Relevant Orders    Hemoglobin A1c    MicroAlbumin, Urine, Random - Urine, Clean Catch       Nervous and Auditory    Chronic right shoulder pain       Other    Elevated liver enzymes  With ALP significantly higher than ALT and AST  Anti-mitochondrial and smooth muscle antibodies will be drawn with his upcoming labs  Referral to GI    Relevant Orders    Comprehensive Metabolic Panel    Mitochondrial Antibodies, M2    Anti-Smooth Muscle Antibody Titer    Ambulatory Referral to Gastroenterology    Elevated TSH  Clinically euthyroid.  Will continue to monitor    Relevant Orders    TSH    Healthcare maintenance  Reminded that  he is due for hepatitis A #2.  Prescription emailed to his pharmacy    Relevant Medications    hepatitis A (HAVRIX) 1440 EL U/ML vaccine    Vertigo  Chronic.  Stable.  Encouraged to report if this should change.

## 2020-06-29 NOTE — PROGRESS NOTES
Spoke with pt about the following per Dr. Nur. VH    -- Needs to start on levothyroxine (probably due to amiodarone)   I have emailed a prescription to [facility]

## 2020-07-09 NOTE — PROGRESS NOTES
History of Present Illness   Aaron Delgado is a 74 y.o. male who presents to the clinic today c/o low back pain which started three to four days ago when he tried to get out of bed. At that he had to request assistance from his wife. He does have HTN, Dyslipidemia, and DM, type 2.      Back Pain    This is a new problem. The current episode started in the past 7 days. The problem occurs intermittently. The problem has been waxing and waning since onset. The pain is present in the lumbar spine. The pain does not radiate. The symptoms are aggravated by sitting. Stiffness is present in the morning. Pertinent negatives include no bladder incontinence, bowel incontinence, chest pain, dysuria, fever, leg pain, numbness, paresis, paresthesias, perianal numbness or tingling. Risk factors include obesity, lack of exercise and sedentary lifestyle. He has tried ice for the symptoms. The treatment provided mild relief.      Hypertension    The current episode started more than 1 year ago. The problem is controlled. Pertinent negatives include no chest pain or shortness of breath. Risk factors for coronary artery disease include dyslipidemia, male gender and sedentary lifestyle. Current antihypertension treatment includes beta blockers, ACEI, and calcium channel blockers.   Lab Results   Component Value Date    BUN 11 07/09/2020    CREATININE 1.33 (H) 07/09/2020    EGFRIFNONA 53 (L) 07/09/2020    EGFRIFAFRI 64 07/09/2020    BCR 8.3 07/09/2020    K 4.5 07/09/2020    CO2 22.3 07/09/2020    CALCIUM 9.9 07/09/2020    PROTENTOTREF 7.0 07/09/2020    ALBUMIN 4.00 07/09/2020    LABIL2 1.3 07/09/2020     (H) 07/09/2020    ALT 80 (H) 07/09/2020      Dyslipidemia    The current episode started more than 1 year ago. Pertinent negatives include no chest pain, leg pain or shortness of breath. Current antihyperlipidemic treatment includes statins . Risk factors for coronary artery disease include diabetes mellitus, dyslipidemia,  hypertension and a sedentary lifestyle.   Lab Results   Component Value Date    CHLPL 90 06/26/2020    TRIG 141 06/26/2020    HDL 29 (L) 06/26/2020    LDL 33 06/26/2020      Diabetes    He has type 2 diabetes mellitus. Pertinent negatives for diabetes include no chest pain, no polydipsia, no polyphagia and no polyuria. Risk factors for coronary artery disease include diabetes mellitus, dyslipidemia, hypertension and sedentary lifestyle. Current diabetic treatment includes oral agent (triple therapy).   Lab Results   Component Value Date    HGBA1C 6.10 (H) 06/26/2020     The following portions of the patient's history were reviewed and updated as appropriate: allergies, current medications, past family history, past medical history, past social history, past surgical history and problem list.    Current Outpatient Medications:   •  amiodarone (PACERONE) 200 MG tablet, Take 1 tablet by mouth Daily., Disp: 90 tablet, Rfl: 3  •  amLODIPine (NORVASC) 10 MG tablet, Take 1 tablet by mouth Every Evening., Disp: 30 tablet, Rfl: 5  •  benazepril (LOTENSIN) 40 MG tablet, Take 1 tablet by mouth Daily., Disp: 90 tablet, Rfl: 3  •  BISACODYL 5 MG EC tablet, TK 4 TS PO FOR1 DOSE PRF CONSTIPATION, Disp: , Rfl:   •  clotrimazole-betamethasone (LOTRISONE) 1-0.05 % cream, Apply  topically to the appropriate area as directed 2 (Two) Times a Day., Disp: 45 g, Rfl: 0  •  doxazosin (CARDURA) 2 MG tablet, 1/2 po nightly for 1 week then 1 po nightly afterward, Disp: 90 tablet, Rfl: 3  •  ELIQUIS 5 MG tablet tablet, TAKE 1 TABLET EVERY 12 HOURS, Disp: 180 tablet, Rfl: 3  •  Empagliflozin-linaGLIPtin (GLYXAMBI) 25-5 MG tablet, Take 1 tablet by mouth Every Morning., Disp: 90 tablet, Rfl: 3  •  fluconazole (Diflucan) 150 MG tablet, Take 1 tablet by mouth Daily As Needed (yeast infection)., Disp: 7 tablet, Rfl: 0  •  fluticasone (CUTIVATE) 0.05 % cream, Apply  topically to the appropriate area as directed 2 (Two) Times a Day., Disp: 30 g, Rfl:  0  •  levothyroxine (SYNTHROID, LEVOTHROID) 50 MCG tablet, Take 1 tablet by mouth Daily., Disp: 90 tablet, Rfl: 3  •  LORazepam (ATIVAN) 0.5 MG tablet, Take 1 tablet by mouth Every 8 (Eight) Hours As Needed (DIZZINESS)., Disp: 10 tablet, Rfl: 0  •  magnesium citrate 1.745 GM/30ML solution solution,  ML PO FOR 1 DOSE, Disp: , Rfl:   •  metFORMIN ER (GLUCOPHAGE-XR) 500 MG 24 hr tablet, Take 4 tablets by mouth Daily With Breakfast., Disp: 360 tablet, Rfl: 3  •  metoprolol succinate XL (TOPROL-XL) 50 MG 24 hr tablet, Take 1 tablet by mouth Every Night. (Patient taking differently: Take 100 mg by mouth Every Night.), Disp: 90 tablet, Rfl: 3  •  ondansetron (ZOFRAN) 4 MG tablet, Take 1 tablet by mouth Every 8 (Eight) Hours As Needed for Vomiting., Disp: 30 tablet, Rfl: 0  •  propafenone (RYTHMOL) 225 MG tablet, Take 1 tablet by mouth Every 8 (Eight) Hours., Disp: 270 tablet, Rfl: 3  •  rosuvastatin (CRESTOR) 20 MG tablet, Take 1 tablet by mouth Every Night., Disp: 90 tablet, Rfl: 3  •  sildenafil (VIAGRA) 100 MG tablet, Take 1 tablet by mouth Daily As Needed for erectile dysfunction., Disp: 30 tablet, Rfl: 0  •  vitamin B-12 (CYANOCOBALAMIN) 1000 MCG tablet, Take 1 tablet by mouth Daily., Disp: 30 tablet, Rfl: 5  •  lidocaine (LIDODERM) 5 %, Place 2 patches on the skin as directed by provider Daily for 30 days. Remove & Discard patch within 12 hours, Disp: 60 patch, Rfl: 1    Current Facility-Administered Medications:   •  cyanocobalamin injection 1,000 mcg, 1,000 mcg, Intramuscular, Q28 Days, Dillan Nur MD, 1,000 mcg at 06/06/19 1359    No Known Allergies    Review of Systems   Constitutional: Positive for activity change and fatigue (Extreme). Negative for appetite change, chills and fever.   HENT: Negative for congestion, sinus pressure and sore throat.    Respiratory: Negative for cough, shortness of breath and wheezing.    Cardiovascular: Negative for chest pain. Leg swelling: Wears compression  "stockings.   Gastrointestinal: Negative for bowel incontinence, nausea and vomiting.   Endocrine: Negative for polydipsia, polyphagia and polyuria.   Genitourinary: Negative for bladder incontinence and dysuria.   Musculoskeletal: Positive for arthralgias and back pain.   Skin: Negative for color change and rash.   Neurological: Positive for dizziness (Chronic and stable) and numbness (Related to diabetes). Negative for tingling and paresthesias.   Hematological: Negative for adenopathy.     Visit Vitals  /66   Pulse 78   Temp 97.1 °F (36.2 °C) (Temporal)   Ht 175.3 cm (69\")   Wt 90.9 kg (200 lb 6.4 oz)   SpO2 96%   BMI 29.59 kg/m²     Physical Exam   Constitutional: He is oriented to person, place, and time. He appears well-developed and well-nourished. No distress.   Pleasant, Wearing appropriate face mask   HENT:   Head: Normocephalic.   Nose: Nose normal.   Eyes: Pupils are equal, round, and reactive to light. Conjunctivae are normal. Right eye exhibits no discharge. Left eye exhibits no discharge. No scleral icterus.   Neck: Normal range of motion. Neck supple. No tracheal tenderness present.   Cardiovascular: Normal rate, regular rhythm and normal heart sounds. Exam reveals no friction rub.   No murmur heard.  Pulmonary/Chest: Effort normal and breath sounds normal. No respiratory distress. He has no wheezes. He has no rales.   Musculoskeletal: He exhibits tenderness. He exhibits no edema.        Lumbar back: He exhibits tenderness and pain. He exhibits normal range of motion, no swelling, no edema and no deformity.   Lymphadenopathy:     He has no cervical adenopathy.   Neurological: He is alert and oriented to person, place, and time.   Skin: Skin is warm and dry. Capillary refill takes less than 2 seconds. No rash noted. No erythema.   Nursing note and vitals reviewed.    Results for orders placed or performed in visit on 07/09/20   Comprehensive Metabolic Panel   Result Value Ref Range    Glucose 146 " (H) 65 - 99 mg/dL    BUN 11 8 - 23 mg/dL    Creatinine 1.33 (H) 0.76 - 1.27 mg/dL    eGFR Non African Am 53 (L) >60 mL/min/1.73    eGFR African Am 64 >60 mL/min/1.73    BUN/Creatinine Ratio 8.3 7.0 - 25.0    Sodium 133 (L) 136 - 145 mmol/L    Potassium 4.5 3.5 - 5.2 mmol/L    Chloride 101 98 - 107 mmol/L    Total CO2 22.3 22.0 - 29.0 mmol/L    Calcium 9.9 8.6 - 10.5 mg/dL    Total Protein 7.0 6.0 - 8.5 g/dL    Albumin 4.00 3.50 - 5.20 g/dL    Globulin 3.0 gm/dL    A/G Ratio 1.3 g/dL    Total Bilirubin 0.5 0.0 - 1.2 mg/dL    Alkaline Phosphatase 421 (H) 39 - 117 U/L    AST (SGOT) 118 (H) 1 - 40 U/L    ALT (SGPT) 80 (H) 1 - 41 U/L   TSH   Result Value Ref Range    TSH 17.300 (H) 0.270 - 4.200 uIU/mL   Sedimentation Rate   Result Value Ref Range    Sed Rate 17 0 - 20 mm/hr   C-reactive Protein   Result Value Ref Range    C-Reactive Protein 1.14 (H) 0.00 - 0.50 mg/dL   POCT urinalysis dipstick, automated   Result Value Ref Range    Color Yellow Yellow, Straw, Dark Yellow, Marcy    Clarity, UA Clear Clear    Specific Gravity  1.015 1.005 - 1.030    pH, Urine 6.0 5.0 - 8.0    Leukocytes Negative Negative    Nitrite, UA Negative Negative    Protein, POC Negative Negative mg/dL    Glucose, UA 3+ (A) Negative, 1000 mg/dL (3+) mg/dL    Ketones, UA Negative Negative    Urobilinogen, UA Normal Normal    Bilirubin Negative Negative    Blood, UA Negative Negative   CBC & Differential   Result Value Ref Range    WBC 6.25 3.40 - 10.80 10*3/mm3    RBC 4.67 4.14 - 5.80 10*6/mm3    Hemoglobin 13.3 13.0 - 17.7 g/dL    Hematocrit 40.8 37.5 - 51.0 %    MCV 87.4 79.0 - 97.0 fL    MCH 28.5 26.6 - 33.0 pg    MCHC 32.6 31.5 - 35.7 g/dL    RDW 14.4 12.3 - 15.4 %    Platelets 214 140 - 450 10*3/mm3    Neutrophil Rel % 71.3 42.7 - 76.0 %    Lymphocyte Rel % 15.2 (L) 19.6 - 45.3 %    Monocyte Rel % 9.0 5.0 - 12.0 %    Eosinophil Rel % 3.2 0.3 - 6.2 %    Basophil Rel % 1.0 0.0 - 1.5 %    Neutrophils Absolute 4.46 1.70 - 7.00 10*3/mm3     Lymphocytes Absolute 0.95 0.70 - 3.10 10*3/mm3    Monocytes Absolute 0.56 0.10 - 0.90 10*3/mm3    Eosinophils Absolute 0.20 0.00 - 0.40 10*3/mm3    Basophils Absolute 0.06 0.00 - 0.20 10*3/mm3    Immature Granulocyte Rel % 0.3 0.0 - 0.5 %    Immature Grans Absolute 0.02 0.00 - 0.05 10*3/mm3    nRBC 0.0 0.0 - 0.2 /100 WBC       Assessment/Plan   Diagnoses and all orders for this visit:    Acute midline low back pain without sciatica  Comments:  Findings and recommendations discussed with Aaron. Treatment options reviewed.   Orders:  -     POCT urinalysis dipstick, automated  -     Sedimentation Rate  -     C-reactive Protein  -     XR Spine Lumbar 2 or 3 View; Future  -     Cancel: CBC & Differential  -     lidocaine (LIDODERM) 5 %; Place 2 patches on the skin as directed by provider Daily for 30 days. Remove & Discard patch within 12 hours    Type 2 diabetes mellitus with peripheral neuropathy (CMS/HCC)  Comments:  Continue Metformin and Glyxambi.     Essential hypertension  Comments:  Well controlled    Mixed hyperlipidemia  Comments:  Continue Crestor     Fatigue, unspecified type  Comments:  Labs ordered  Orders:  -     CBC & Differential  -     Comprehensive Metabolic Panel  -     TSH  -     Cancel: CBC & Differential    Findings and recommendations discussed with Aaron. Treatment options reviewed. Diagnostic tests and labs ordered which will be discussed by phone when available. Counseled regarding supportive care measures. S/S of concern reviewed and if occur to seek immediate medical evaluation or if symptoms worsen. DM, type 2 well controlled on Metformin and Glyxambi. Blood pressure is at target. Continue Cardiovascular risk reduction. He will f/u with me on 7/21/2020.     This document has been electronically signed by AMARILIS Stewart, EDEN-BRAULIO LEO  July 9, 2020 10:02

## 2020-07-16 PROBLEM — K76.0 NAFLD (NONALCOHOLIC FATTY LIVER DISEASE): Status: ACTIVE | Noted: 2020-01-01

## 2020-07-16 NOTE — PROGRESS NOTES
": 1945    Chief Complaint   Patient presents with   • Elevated Hepatic Enzymes   • Results     CT scan abd/pelv       Aaron Delgado is a 75 y.o. male who presents to the office today as a follow up appointment regarding Elevated Hepatic Enzymes and Results (CT scan abd/pelv).    History of Present Illness:  Patient reports that he was recently told he had elevated liver enzymes but does have a known history of NAFLD and reported cirrhosis. He would like to discuss recent CT scan results. He states he was diagnosed last year by Dr. Fine (GI) and does have a history of diabetes and elevated cholesterol. He has had recent labs (a few days ago) with PCP. He does not have his gallbladder. He denies any current alcohol use and no prior alcoholism. There is no known family history of liver disease. 6 months ago, he had colonoscopy with Dr. Fine in [place], had colon polyps removed at that time. Does report bloating abdominal pain located in epigastric region. Today, he reports that he feels \"sore\" in his epigastric region, this is constant and worse since last visit. He denies any heartburn, does not take any PPIs. He sees cardiologist, Dr. Bishop, taking Eliquis.     Review of Systems   Constitutional: Negative.    HENT: Negative for sore throat and trouble swallowing.    Eyes: Negative.    Respiratory: Negative for chest tightness.    Cardiovascular: Negative for chest pain.   Gastrointestinal: Positive for abdominal distention and abdominal pain. Negative for anal bleeding, blood in stool, constipation, diarrhea, nausea, rectal pain and vomiting.   Endocrine: Negative.    Genitourinary: Negative.    Musculoskeletal: Negative for back pain and neck pain.   Skin: Negative.  Negative for rash.   Allergic/Immunologic: Negative for environmental allergies and food allergies.   Neurological: Positive for dizziness. Negative for headaches.   Hematological: Bruises/bleeds easily.   Psychiatric/Behavioral: " Negative for agitation and sleep disturbance. The patient is not nervous/anxious.      I have reviewed and confirmed the accuracy of the ROS as documented by the MA/LPN/RN Selina Alston PA-C    Past Medical History:   Diagnosis Date   • A-fib (CMS/[facility])    • CHF (congestive heart failure) (CMS/[facility])    • Coronary artery disease    • Hyperlipidemia    • Hypertension        Past Surgical History:   Procedure Laterality Date   • CORONARY ARTERY BYPASS GRAFT         History reviewed. No pertinent family history.    Social History     Socioeconomic History   • Marital status:      Spouse name: Not on file   • Number of children: Not on file   • Years of education: Not on file   • Highest education level: Not on file   Occupational History   • Occupation: Retired   Social Needs   • Financial resource strain: Patient refused   • Food insecurity:     Worry: Patient refused     Inability: Patient refused   • Transportation needs:     Medical: Patient refused     Non-medical: Patient refused   Tobacco Use   • Smoking status: Never Smoker   • Smokeless tobacco: Never Used   Substance and Sexual Activity   • Alcohol use: No     Frequency: Never   • Drug use: No   • Sexual activity: Defer   Lifestyle   • Physical activity:     Days per week: Patient refused     Minutes per session: Patient refused   • Stress: Patient refused   Relationships   • Social connections:     Talks on phone: Patient refused     Gets together: Patient refused     Attends Mu-ism service: Patient refused     Active member of club or organization: Patient refused     Attends meetings of clubs or organizations: Patient refused     Relationship status: Patient refused       Current Outpatient Medications:   •  amiodarone (PACERONE) 200 MG tablet, Take 1 tablet by mouth Daily., Disp: 90 tablet, Rfl: 3  •  amLODIPine (NORVASC) 10 MG tablet, Take 1 tablet by mouth Every Evening., Disp: 30 tablet, Rfl: 5  •  benazepril (LOTENSIN) 40 MG tablet, Take 1  tablet by mouth Daily., Disp: 90 tablet, Rfl: 3  •  BISACODYL 5 MG EC tablet, TK 4 TS PO FOR1 DOSE PRF CONSTIPATION, Disp: , Rfl:   •  clotrimazole-betamethasone (LOTRISONE) 1-0.05 % cream, Apply  topically to the appropriate area as directed 2 (Two) Times a Day., Disp: 45 g, Rfl: 0  •  doxazosin (CARDURA) 2 MG tablet, 1/2 po nightly for 1 week then 1 po nightly afterward, Disp: 90 tablet, Rfl: 3  •  ELIQUIS 5 MG tablet tablet, TAKE 1 TABLET EVERY 12 HOURS, Disp: 180 tablet, Rfl: 3  •  Empagliflozin-linaGLIPtin (GLYXAMBI) 25-5 MG tablet, Take 1 tablet by mouth Every Morning., Disp: 90 tablet, Rfl: 3  •  fluconazole (Diflucan) 150 MG tablet, Take 1 tablet by mouth Daily As Needed (yeast infection)., Disp: 7 tablet, Rfl: 0  •  fluticasone (CUTIVATE) 0.05 % cream, Apply  topically to the appropriate area as directed 2 (Two) Times a Day., Disp: 30 g, Rfl: 0  •  levothyroxine (SYNTHROID, LEVOTHROID) 50 MCG tablet, Take 1 tablet by mouth Daily., Disp: 90 tablet, Rfl: 3  •  lidocaine (LIDODERM) 5 %, Place 2 patches on the skin as directed by provider Daily for 30 days. Remove & Discard patch within 12 hours, Disp: 60 patch, Rfl: 1  •  LORazepam (ATIVAN) 0.5 MG tablet, Take 1 tablet by mouth Every 8 (Eight) Hours As Needed (DIZZINESS)., Disp: 10 tablet, Rfl: 0  •  magnesium citrate 1.745 GM/30ML solution solution,  ML PO FOR 1 DOSE, Disp: , Rfl:   •  metFORMIN ER (GLUCOPHAGE-XR) 500 MG 24 hr tablet, Take 4 tablets by mouth Daily With Breakfast., Disp: 360 tablet, Rfl: 3  •  metoprolol succinate XL (TOPROL-XL) 50 MG 24 hr tablet, Take 1 tablet by mouth Every Night. (Patient taking differently: Take 100 mg by mouth Every Night.), Disp: 90 tablet, Rfl: 3  •  ondansetron (ZOFRAN) 4 MG tablet, Take 1 tablet by mouth Every 8 (Eight) Hours As Needed for Vomiting., Disp: 30 tablet, Rfl: 0  •  propafenone (RYTHMOL) 225 MG tablet, Take 1 tablet by mouth Every 8 (Eight) Hours., Disp: 270 tablet, Rfl: 3  •  rosuvastatin (CRESTOR)  "20 MG tablet, Take 1 tablet by mouth Every Night., Disp: 90 tablet, Rfl: 3  •  sildenafil (VIAGRA) 100 MG tablet, Take 1 tablet by mouth Daily As Needed for erectile dysfunction., Disp: 30 tablet, Rfl: 0  •  vitamin B-12 (CYANOCOBALAMIN) 1000 MCG tablet, Take 1 tablet by mouth Daily., Disp: 30 tablet, Rfl: 5    Current Facility-Administered Medications:   •  cyanocobalamin injection 1,000 mcg, 1,000 mcg, Intramuscular, Q28 Days, Dillan Nur MD, 1,000 mcg at 06/06/19 1359    Allergies:   Patient has no known allergies.    Vitals:  /68 (BP Location: Left arm, Patient Position: Sitting, Cuff Size: Adult)   Pulse 60   Temp 98.3 °F (36.8 °C)   Ht 175.3 cm (69\")   Wt 91.3 kg (201 lb 3.2 oz)   SpO2 96%   BMI 29.71 kg/m²     Physical Exam   Constitutional: He is oriented to person, place, and time. He appears well-developed and well-nourished. No distress.   HENT:   Head: Normocephalic and atraumatic.   Right Ear: External ear normal.   Left Ear: External ear normal.   Wearing a mask   Eyes: Conjunctivae are normal. Right eye exhibits no discharge. Left eye exhibits no discharge. No scleral icterus.   Neck: Normal range of motion. No JVD present.   Cardiovascular: Normal rate, regular rhythm and normal heart sounds. Exam reveals no gallop and no friction rub.   No murmur heard.  Pulmonary/Chest: Effort normal and breath sounds normal. No respiratory distress. He has no wheezes. He has no rales. He exhibits no tenderness.   Abdominal: Soft. Bowel sounds are normal. He exhibits distension (bloated). He exhibits no mass. There is tenderness (epigastric, moderate). There is guarding (voluntary). There is no rebound. No hernia.   Diastasis rectus noted   Musculoskeletal: Normal range of motion. He exhibits no edema or deformity.   Neurological: He is alert and oriented to person, place, and time. Coordination normal.   Skin: Skin is warm and dry. No rash noted. He is not diaphoretic. No erythema. "   Scattered bruising   Psychiatric: He has a normal mood and affect. His behavior is normal. Judgment and thought content normal.   Vitals reviewed.    Results Review:  Labs 2020: Antimitochondrial antibody normal, anti-smooth muscle antibody normal, TSH elevated, CBC with normal hematocrit and hemoglobin, normal platelets, iron panel previously normal.  Labs in 2019 showed acute hepatitis panel negative for hepatitis A, B, and C, GGT previously elevated at 173.  It is noted that TSH has been elevated all the way back through the entire year of 2019.     6/2019 US abd:  The liver is homogeneous. There is no intrahepatic ductal dilatation or  focal hepatic mass.  The imaged portion of the hepatic vessels and inferior vena cava are  patent.  Gallbladder has been surgically removed  The common bile duct is normal, measuring 4.4 mm.     Labs 6/26/2020: GFR 55, alk phos 422, , ALT 80, total bilirubin 0.5, creatinine 1.28, total cholesterol 90,   triglycerides 141, LDL cholesterol 33, TSH elevated 19.800, hemoglobin A1c 6.10.     AP, AST and ALT trend:  Results for KRIS STEELE (MRN [id number]) as of 7/1/2020 14:01    Ref. Range 6/6/2019 14:01 6/28/2019 14:04 7/5/2019 09:25 8/1/2019 11:00 9/20/2019 00:00 11/27/2019 16:23 12/10/2019 19:24 12/13/2019 16:00 6/26/2020 10:42   ALT (SGPT) Latest Ref Range: 1 - 41 U/L 73 (H) 56 (H) 37 58 (H) 45 (H) 68 (H) 70 (H) 62 (H) 80 (H)   AST (SGOT) Latest Ref Range: 1 - 40 U/L 71 (H) 53 (H) 40 65 (H) 63 (H) 120 (H) 106 (H) 88 (H) 144 (H)   Alkaline Phosphatase Latest Ref Range: 39 - 117 U/L 203 (H) 149 (H) 115 129 (H) 121 (H) 192 (H) 205 (H) 199 (H) 422 (H)      CT scan abd/pelv with contrast 7/13/2020:  1. There is trace free fluid in the right lower quadrant, but no  evidence of appendicitis otherwise.  2. Gastric wall is slightly thickened.  3. Sigmoid diverticuli.  4. Prostate enlargement.  Liver normal     Assessment:  1. Abnormal CT scan, stomach    2. Epigastric abdominal  pain    3. Bloating    4. History of adenomatous polyp of colon    5. Diverticulosis    6. Elevated alkaline phosphatase in     7. Abnormal AST and ALT    8. Abnormal finding of blood chemistry, unspecified     9. Hyperlipidemia, unspecified hyperlipidemia type       Plan:  Orders Placed This Encounter   Procedures   • Alpha - 1 - Antitrypsin   • Ceruloplasmin   • IgG, IgA, IgM   • AGUILERA Fibrosure   • Tissue Transglutaminase, IgA   • Follow Anesthesia Guidelines / Standing Orders   • Obtain Informed Consent     ESOPHAGOGASTRODUODENOSCOPY WITH BIOPSY CPT CODE: 58617 (N/A)  He will need an esophagogastroduodenoscopy performed with IV general sedation. All of the risks, benefits and alternatives of this procedure have been discussed with him, all of his questions have been answered and he has elected to proceed. He should follow up in the office after this procedure to discuss the results and further recommendations can be made at that time.    He should have repeat [facility] colonoscopy in 5 years due to previous finding of tubular adenomatous polyps x2 in 2020.     He has had several previous useful labs to evaluate elevated liver enzymes which have been reviewed. He will have labs today. Differential diagnoses for persistent elevated AST, ALT and Alkaline phosphatase are gastric pathology, thyroid disease (very abn TSH noted previous), liver diseases including fatty liver (although it is noted recent CT showed normal liver).  Alkaline phosphatase along with AST and ALT have been rising since early .        Return for follow up after procedure to discuss results.      Electronically signed 2020 12:54  Selina Alston PA-C, [facility]

## 2020-07-21 PROBLEM — M51.36 DDD (DEGENERATIVE DISC DISEASE), LUMBAR: Status: ACTIVE | Noted: 2020-01-01

## 2020-07-21 NOTE — PATIENT INSTRUCTIONS
Type 2 Diabetes Mellitus, Self Care, Adult  When you have type 2 diabetes (type 2 diabetes mellitus), you must make sure your blood sugar (glucose) stays in a healthy range. You can do this with:  · Nutrition.  · Exercise.  · Lifestyle changes.  · Medicines or insulin, if needed.  · Support from your doctors and others.  How to stay aware of blood sugar    · Check your blood sugar level every day, as often as told.  · Have your A1c (hemoglobin A1c) level checked two or more times a year. Have it checked more often if your doctor tells you to.  Your doctor will set personal treatment goals for you. Generally, you should have these blood sugar levels:  · Before meals (preprandial):  mg/dL (4.4-7.2 mmol/L).  · After meals (postprandial): below 180 mg/dL (10 mmol/L).  · A1c level: less than 7%.  How to manage high and low blood sugar  Signs of high blood sugar  High blood sugar is called hyperglycemia. Know the signs of high blood sugar. Signs may include:  · Feeling:  ? Thirsty.  ? Hungry.  ? Very tired.  · Needing to pee (urinate) more than usual.  · Blurry vision.  Signs of low blood sugar  Low blood sugar is called hypoglycemia. This is when blood sugar is at or below 70 mg/dL (3.9 mmol/L). Signs may include:  · Feeling:  ? Hungry.  ? Worried or nervous (anxious).  ? Sweaty and clammy.  ? Confused.  ? Dizzy.  ? Sleepy.  ? Sick to your stomach (nauseous).  · Having:  ? A fast heartbeat.  ? A headache.  ? A change in your vision.  ? Jerky movements that you cannot control (seizure).  ? Tingling or no feeling (numbness) around your mouth, lips, or tongue.  · Having trouble with:  ? Moving (coordination).  ? Sleeping.  ? Passing out (fainting).  ? Getting upset easily (irritability).  Treating low blood sugar  To treat low blood sugar, eat or drink something sugary right away. If you can think clearly and swallow safely, follow the 15:15 rule:  · Take 15 grams of a fast-acting carb (carbohydrate). Talk with your  doctor about how much you should take.  · Some fast-acting carbs are:  ? Sugar tablets (glucose pills). Take 3-4 pills.  ? 6-8 pieces of hard candy.  ? 4-6 oz (120-150 mL) of fruit juice.  ? 4-6 oz (120-150 mL) of regular (not diet) soda.  ? 1 Tbsp (15 mL) honey or sugar.  · Check your blood sugar 15 minutes after you take the carb.  · If your blood sugar is still at or below 70 mg/dL (3.9 mmol/L), take 15 grams of a carb again.  · If your blood sugar does not go above 70 mg/dL (3.9 mmol/L) after 3 tries, get help right away.  · After your blood sugar goes back to normal, eat a meal or a snack within 1 hour.  Treating very low blood sugar  If your blood sugar is at or below 54 mg/dL (3 mmol/L), you have very low blood sugar (severe hypoglycemia). This is an emergency. Do not wait to see if the symptoms will go away. Get medical help right away. Call your local emergency services (911 in the [place]  If you have very low blood sugar and you cannot eat or drink, you may need a glucagon shot (injection). A family member or friend should learn how to check your blood sugar and how to give you a glucagon shot. Ask your doctor if you need to have a glucagon shot kit at home.  Follow these instructions at home:  Medicine  · Take insulin and diabetes medicines as told.  · If your doctor says you should take more or less insulin and medicines, do this exactly as told.  · Do not run out of insulin or medicines.  Having diabetes can raise your risk for other long-term conditions. These include heart disease and kidney disease. Your doctor may prescribe medicines to help you not have these problems.  Food    · Make healthy food choices. These include:  ? Chicken, fish, egg whites, and beans.  ? Oats, whole wheat, bulgur, brown rice, quinoa, and millet.  ? Fresh fruits and vegetables.  ? Low-fat dairy products.  ? Nuts, avocado, olive oil, and canola oil.  · Meet with a  (dietitian). He or she can help you make an  eating plan that is right for you.  · Follow instructions from your doctor about what you cannot eat or drink.  · Drink enough fluid to keep your pee (urine) pale yellow.  · Keep track of carbs that you eat. Do this by reading food labels and learning food serving sizes.  · Follow your sick day plan when you cannot eat or drink normally. Make this plan with your doctor so it is ready to use.  Activity  · Exercise 3 or more times a week.  · Do not go more than 2 days without exercising.  · Talk with your doctor before you start a new exercise. Your doctor may need to tell you to change:  ? How much insulin or medicines you take.  ? How much food you eat.  Lifestyle  · Do not use any tobacco products. These include cigarettes, chewing tobacco, and e-cigarettes. If you need help quitting, ask your doctor.  · Ask your doctor how much alcohol is safe for you.  · Learn to deal with stress. If you need help with this, ask your doctor.  Body care    · Stay up to date with your shots (immunizations).  · Have your eyes and feet checked by a doctor as often as told.  · Check your skin and feet every day. Check for cuts, bruises, redness, blisters, or sores.  · Brush your teeth and gums two times a day. Floss one or more times a day.  · Go to the dentist one or more times every 6 months.  · Stay at a healthy weight.  General instructions  · Take over-the-counter and prescription medicines only as told by your doctor.  · Share your diabetes care plan with:  ? Your work or school.  ? People you live with.  · Carry a card or wear jewelry that says you have diabetes.  · Keep all follow-up visits as told by your doctor. This is important.  Questions to ask your doctor  · Do I need to meet with a diabetes educator?  · Where can I find a support group for people with diabetes?  Where to find more information  To learn more about diabetes, visit:  · American Diabetes Association: www.diabetes.org  · American Association of Diabetes  Educators: www.diabeteseducator.org  Summary  · When you have type 2 diabetes, you must make sure your blood sugar (glucose) stays in a healthy range.  · Check your blood sugar every day, as often as told.  · Having diabetes can raise your risk for other conditions. Your doctor may prescribe medicines to help you not have these problems.  · Keep all follow-up visits as told by your doctor. This is important.  This information is not intended to replace advice given to you by your health care provider. Make sure you discuss any questions you have with your health care provider.  Document Released: 04/10/2017 Document Revised: 06/10/2019 Document Reviewed: 01/20/2017  [employer] Patient Education © 2020 [employer]  Degenerative Disk Disease    Degenerative disk disease is a condition caused by changes that occur in the spinal disks as a person ages. Spinal disks are soft and compressible disks located between the bones of your spine (vertebrae). These disks act like shock absorbers.  Degenerative disk disease can affect the whole spine. However, the neck and lower back are most often affected. Many changes can occur in the spinal disks with aging, such as:  · The spinal disks may dry and shrink.  · Small tears may occur in the tough, outer covering of the disk (annulus).  · The disk space may become smaller due to loss of water.  · Abnormal growths in the bone (spurs) may occur. This can put pressure on the nerve roots exiting the spinal canal, causing pain.  · The spinal canal may become narrowed.  What are the causes?  This condition may be caused by:  · Normal degeneration with age.  · Injuries.  · Certain activities and sports that cause damage.  What increases the risk?  The following factors may make you more likely to develop this condition:  · Being overweight.  · Having a family history of degenerative disk disease.  · Smoking.  · Sudden injury.  · Doing work that requires heavy lifting.  What are the signs  or symptoms?  Symptoms of this condition include:  · Pain that varies in intensity. Some people have no pain, while others have severe pain. The location of the pain depends on the part of your backbone that is affected. You may have:  ? Pain in your neck or arm if a disk in your neck area is affected.  ? Pain in your back, buttocks, or legs if a disk in your lower back is affected.  · Pain that becomes worse while bending or reaching up, or with twisting movements.  · Pain that may start gradually and then get worse as time passes. It may also start after a major or minor injury.  · Numbness or tingling in the arms or legs.  How is this diagnosed?  This condition may be diagnosed based on:  · Your symptoms and medical history.  · A physical exam.  · Imaging tests, including:  ? An X-ray of the spine.  ? MRI.  How is this treated?  This condition may be treated with:  · Medicines.  · Rehabilitation exercises. These activities aim to strengthen muscles in your back and abdomen to better support your spine.  If treatments do not help to relieve your symptoms or you have severe pain, you may need surgery.  Follow these instructions at home:  Medicines  · Take over-the-counter and prescription medicines only as told by your health care provider.  · Do not drive or use heavy machinery while taking prescription pain medicine.  · If you are taking prescription pain medicine, take actions to prevent or treat constipation. Your health care provider may recommend that you:  ? Drink enough fluid to keep your urine pale yellow.  ? Eat foods that are high in fiber, such as fresh fruits and vegetables, whole grains, and beans.  ? Limit foods that are high in fat and processed sugars, such as fried or sweet foods.  ? Take an over-the-counter or prescription medicine for constipation.  Activity  · Rest as told by your health care provider.  · Ask your health care provider what activities are safe for you. Return to your normal  activities as directed.  · Avoid sitting for a long time without moving. Get up to take short walks every 1-2 hours. This is important to improve blood flow and breathing. Ask for help if you feel weak or unsteady.  · Perform relaxation exercises as told by your health care provider.  · Maintain good posture.  · Do not lift anything that is heavier than 10 lb (4.5 kg), or the limit that you are told, until your health care provider says that it is safe.  · Follow proper lifting and walking techniques as told by your health care provider.  Managing pain, stiffness, and swelling    · If directed, put ice on the painful area. Icing can help to relieve pain.  ? Put ice in a plastic bag.  ? Place a towel between your skin and the bag.  ? Leave the ice on for 20 minutes, 2-3 times a day.  · If directed, apply heat to the painful area as often as told by your health care provider. Heat can reduce the stiffness of your muscles. Use the heat source that your health care provider recommends, such as a moist heat pack or a heating pad.  ? Place a towel between your skin and the heat source.  ? Leave the heat on for 20-30 minutes.  ? Remove the heat if your skin turns bright red. This is especially important if you are unable to feel pain, heat, or cold. You may have a greater risk of getting burned.  General instructions  · Change your sitting, standing, and sleeping habits as told by your health care provider.  · Avoid sitting in the same position for long periods of time. Change positions frequently.  · Lose weight or maintain a healthy weight as told by your health care provider.  · Do not use any products that contain nicotine or tobacco, such as cigarettes and e-cigarettes. If you need help quitting, ask your health care provider.  · Wear supportive footwear.  · Keep all follow-up visits as told by your health care provider. This is important. This may include visits for physical therapy.  Contact a health care provider  if you:  · Have pain that does not go away within 1-4 weeks.  · Lose your appetite.  · Lose weight without trying.  Get help right away if you:  · Have severe pain.  · Notice weakness in your arms, hands, or legs.  · Begin to lose control of your bladder or bowel movements.  · Have fevers or night sweats.  Summary  · Degenerative disk disease is a condition caused by changes that occur in the spinal disks as a person ages.  · Degenerative disk disease can affect the whole spine. However, the neck and lower back are most often affected.  · Take over-the-counter and prescription medicines only as told by your health care provider.  This information is not intended to replace advice given to you by your health care provider. Make sure you discuss any questions you have with your health care provider.  Document Released: 10/14/2008 Document Revised: 12/13/2018 Document Reviewed: 12/13/2018  [employer] Patient Education © 2020 [employer]

## 2020-07-21 NOTE — PROGRESS NOTES
History of Present Illness    Aaron Delgado is a 75 y.o. male who presents to the clinic today for follow up and to review results of x rays and lab results which he had completed last week pertaining to his low back pain and hyponatremia. Solomon does have HTN, Dyslipidemia, and DM, type 2.    Back Pain   The current episode started in the past 10 days.  The pain is present in the lumbar spine. The pain does not radiate. The symptoms are aggravated by sitting. Stiffness is present in the morning. Pertinent negatives include no bladder incontinence, bowel incontinence, dysuria, fever, leg pain, numbness, paresis, paresthesias, perianal numbness or tingling. Risk factors include obesity, lack of exercise and sedentary lifestyle. He has tried ice and Icy Rub for the symptoms. The treatment provided mild relief. His LBP is improving.      Hypertension   The problem is controlled. Pertinent negatives include no chest pain or shortness of breath. Risk factors for coronary artery disease include dyslipidemia, male gender and sedentary lifestyle. Current antihypertension treatment includes beta blockers, ACEI, and calcium channel blockers.     Lab Results   Component Value Date    CREATININE 1.43 (H) 07/21/2020      Dyslipidemia    Current antihyperlipidemic treatment includes statins . Risk factors for coronary artery disease include diabetes mellitus, dyslipidemia, hypertension and a sedentary lifestyle.   Lab Results   Component Value Date    CHLPL 90 06/26/2020    TRIG 141 06/26/2020    HDL 29 (L) 06/26/2020    LDL 33 06/26/2020      Diabetes    He has type 2 diabetes mellitus. Pertinent negatives for diabetes include no chest pain, no polydipsia, no polyphagia and no polyuria. Risk factors for coronary artery disease include diabetes mellitus, dyslipidemia, hypertension and sedentary lifestyle. Current diabetic treatment includes oral agent (triple therapy).   Lab Results   Component Value Date    HGBA1C 6.10 (H)  "06/26/2020     Vitals:    07/21/20 1203   BP: 116/58   BP Location: Right arm   Patient Position: Sitting   Pulse: 61   Resp: 16   Temp: 97.9 °F (36.6 °C)   TempSrc: Temporal   SpO2: 96%   Weight: 89.4 kg (197 lb)   Height: 175.3 cm (69\")        The following portions of the patient's history were reviewed and updated as appropriate: allergies, current medications, past family history, past medical history, past social history, past surgical history and problem list.    Review of Systems   Constitutional: Positive for activity change (Improving). Negative for appetite change, chills, fatigue, fever and unexpected weight change.   HENT: Negative.    Eyes: Negative for visual disturbance.   Respiratory: Negative for cough, chest tightness, shortness of breath and wheezing.    Cardiovascular: Negative for chest pain, palpitations and leg swelling.   Gastrointestinal: Negative for abdominal pain, constipation, diarrhea, nausea and vomiting.   Endocrine: Negative for cold intolerance, heat intolerance, polydipsia, polyphagia and polyuria.   Musculoskeletal: Positive for arthralgias and back pain (Improving).   Skin: Negative for color change and rash.   Neurological: Positive for dizziness (Chronic and stable). Negative for tremors, speech difficulty, weakness, light-headedness and headaches.   Hematological: Negative for adenopathy.   Psychiatric/Behavioral: Negative for confusion and decreased concentration. The patient is not nervous/anxious.    All other systems reviewed and are negative.    Physical Exam   Constitutional: He is oriented to person, place, and time. He appears well-developed and well-nourished. No distress.   Pleasant; ambulating well. No difficulty getting upon exam table   HENT:   Head: Normocephalic.   Right Ear: Tympanic membrane and ear canal normal.   Left Ear: Tympanic membrane and ear canal normal.   Nose: Nose normal.   Mouth/Throat: No oropharyngeal exudate.   Eyes: Pupils are equal, round, " and reactive to light. Conjunctivae are normal. Right eye exhibits no discharge. Left eye exhibits no discharge.   Neck: Normal range of motion. Neck supple. No tracheal tenderness present.   Cardiovascular: Normal rate, regular rhythm and normal heart sounds. Exam reveals no friction rub.   No murmur heard.  Pulmonary/Chest: Effort normal and breath sounds normal. No respiratory distress. He has no wheezes. He has no rales.   Musculoskeletal: He exhibits tenderness. He exhibits no edema.        Lumbar back: He exhibits tenderness.   Lymphadenopathy:     He has no cervical adenopathy.   Neurological: He is alert and oriented to person, place, and time.   Skin: Skin is warm and dry. Capillary refill takes less than 2 seconds. No rash noted. No erythema.   Psychiatric: He has a normal mood and affect. His behavior is normal. Judgment normal.   Nursing note and vitals reviewed.    Assessment/Plan     Patient's Body mass index is 29.09 kg/m². BMI is above normal parameters. Recommendations include: exercise counseling and nutrition counseling.   (Normal BMI:  18.5-24.9, OW 25-29.9, Obesity 30 or greater)    Problems Addressed this Visit        Cardiovascular and Mediastinum    Mixed hyperlipidemia    Essential hypertension       Endocrine    Type 2 diabetes mellitus with peripheral neuropathy (CMS/HCC)       Musculoskeletal and Integument    DDD (degenerative disc disease), lumbar - Primary    Relevant Medications    lidocaine (LIDODERM) 5 %      Other Visit Diagnoses     Hyponatremia        ;Repeat CMP       Findings and recommendations discussed with Aaron. Reviewed his recent Lumbar Spine X rays with the following impression:    1. Advanced multilevel degenerative changes.  2. Aortic calcifications.  3. No acute fracture or malalignment radiographically evident.  His previous CMP revealed hyponatremia; Repeat CMP showed an improved. He will continue Metformin, and Glyxambi for glycemic management. Blood pressure is  well controlled. He will continue Crestor for cardiovascular risk reduction. Lifestyle modifications including nutrition and exercise recommendations reinforced. He will f/u with Dr Nur in September; sooner if problems/concerns occur.     This document has been electronically signed by AMARILIS Stewart, EDEN-BC, ARUNAE  July 21, 2020 12:24

## 2020-08-11 PROBLEM — R93.3 ABNORMAL CT SCAN, STOMACH: Status: ACTIVE | Noted: 2020-01-01

## 2020-08-11 PROBLEM — R74.8 ELEVATED ALKALINE PHOSPHATASE IN NEWBORN: Status: ACTIVE | Noted: 2020-01-01

## 2020-08-11 PROBLEM — R14.0 BLOATING: Status: ACTIVE | Noted: 2020-01-01

## 2020-08-11 PROBLEM — R10.13 EPIGASTRIC ABDOMINAL PAIN: Status: ACTIVE | Noted: 2020-01-01

## 2020-08-17 NOTE — H&P
Chief complaint epigastric pain    Subjective     Patient is a 75 y.o. male who presents today for an EGD.  The patient has a history of non-alcoholic cirrhosis.  He reports epigastric pain that has been going on for some time.  He also reports significant acid reflux following meals.  Patient denies other GI symptoms at this time.  His liver enzymes have been increasing since 2019.  Recent CT scan of abdomen revealed thickening of the gastric wall.  Liver appeared normal.  Recent labs revealed an alkaline phosphatase of 467, ALT of 64, AST of 113, GGT of 841, alpha 2-Macroglobulins of 327, ceruloplasmin of 31.7, fibrosis score of 0.89, and AGUILERA score of 0.50.  He denies family history of GI disease.  Medical, surgical, social, and family histories were reviewed and are listed below.      Review of Systems  Review of Systems - General ROS: negative for - weight loss  Psychological ROS: negative for - behavioral disorder  Ophthalmic ROS: negative for - dry eyes  ENT ROS: negative for - vertigo or vocal changes  Hematological and Lymphatic ROS: negative for - jaundice or swollen lymph nodes  Respiratory ROS: negative for - sputum changes or stridor  Cardiovascular ROS: negative for - irregular heartbeat or murmur  Gastrointestinal ROS: positive for-epigastric pain, acid reflux;  negative for - blood in stools or change in stools  Genito-Urinary ROS: negative for - hematuria or incontinence  Musculoskeletal ROS: negative for - gait disturbance      History  Past Medical History:   Diagnosis Date   • A-fib (CMS/HCC)    • Arthritis    • CHF (congestive heart failure) (CMS/HCC)    • Coronary artery disease    • Diabetes mellitus (CMS/HCC)    • Elevated cholesterol    • GERD (gastroesophageal reflux disease)    • Hyperlipidemia    • Hypertension      Past Surgical History:   Procedure Laterality Date   • CHOLECYSTECTOMY     • COLONOSCOPY  01/2020    Dr. Fine- tubular adenomatous colon polyps x2   • CORONARY ARTERY  BYPASS GRAFT       History reviewed. No pertinent family history.  Social History     Tobacco Use   • Smoking status: Never Smoker   • Smokeless tobacco: Never Used   Substance Use Topics   • Alcohol use: No     Frequency: Never   • Drug use: No     Facility-Administered Medications Prior to Admission   Medication Dose Route Frequency Provider Last Rate Last Dose   • cyanocobalamin injection 1,000 mcg  1,000 mcg Intramuscular Q28 Days Dillan Nur MD   1,000 mcg at 06/06/19 1359     Medications Prior to Admission   Medication Sig Dispense Refill Last Dose   • amiodarone (PACERONE) 200 MG tablet Take 1 tablet by mouth Daily. 90 tablet 3 Taking   • amLODIPine (NORVASC) 10 MG tablet Take 1 tablet by mouth Every Evening. 30 tablet 5 Taking   • benazepril (LOTENSIN) 40 MG tablet Take 1 tablet by mouth Daily. 90 tablet 3 Taking   • BISACODYL 5 MG EC tablet TK 4 TS PO FOR1 DOSE PRF CONSTIPATION   Taking   • clotrimazole-betamethasone (LOTRISONE) 1-0.05 % cream Apply  topically to the appropriate area as directed 2 (Two) Times a Day. 45 g 0 Taking   • doxazosin (CARDURA) 2 MG tablet 1/2 po nightly for 1 week then 1 po nightly afterward 90 tablet 3 Taking   • ELIQUIS 5 MG tablet tablet TAKE 1 TABLET EVERY 12 HOURS 180 tablet 3 Taking   • Empagliflozin-linaGLIPtin (GLYXAMBI) 25-5 MG tablet Take 1 tablet by mouth Every Morning. 90 tablet 3 Taking   • fluconazole (Diflucan) 150 MG tablet Take 1 tablet by mouth Daily As Needed (yeast infection). 7 tablet 0 Taking   • fluticasone (CUTIVATE) 0.05 % cream Apply  topically to the appropriate area as directed 2 (Two) Times a Day. 30 g 0 Taking   • levothyroxine (SYNTHROID, LEVOTHROID) 50 MCG tablet Take 1 tablet by mouth Daily. 90 tablet 3 Taking   • lidocaine (LIDODERM) 5 % Place 1 patch on the skin as directed by provider Daily. Remove & Discard patch within 12 hours or as directed by MD Serrato patch 1    • LORazepam (ATIVAN) 0.5 MG tablet Take 1 tablet by mouth Every 8  (Eight) Hours As Needed (DIZZINESS). 10 tablet 0 Taking   • magnesium citrate 1.745 GM/30ML solution solution  ML PO FOR 1 DOSE   Taking   • metFORMIN ER (GLUCOPHAGE-XR) 500 MG 24 hr tablet TAKE 4 TABLETS DAILY WITH BREAKFAST 360 tablet 3 Taking   • metoprolol succinate XL (TOPROL-XL) 50 MG 24 hr tablet Take 1 tablet by mouth Every Night. (Patient taking differently: Take 100 mg by mouth Every Night.) 90 tablet 3 Taking   • ondansetron (ZOFRAN) 4 MG tablet Take 1 tablet by mouth Every 8 (Eight) Hours As Needed for Vomiting. 30 tablet 0 Taking   • propafenone (RYTHMOL) 225 MG tablet Take 1 tablet by mouth Every 8 (Eight) Hours. 270 tablet 3 Taking   • rosuvastatin (CRESTOR) 20 MG tablet Take 1 tablet by mouth Every Night. 90 tablet 3 Taking   • sildenafil (VIAGRA) 100 MG tablet Take 1 tablet by mouth Daily As Needed for Erectile Dysfunction. 30 tablet 2    • vitamin B-12 (CYANOCOBALAMIN) 1000 MCG tablet Take 1 tablet by mouth Daily. 30 tablet 5 Taking     Allergies:  Patient has no known allergies.    Objective     Vital Signs       Physical Exam  General:  This is a pleasant gentleman in no acute distress  Vital signs stable, afebrile  HEENT exam:  WNL. Sclera are anicteric.  EOMI  Neck:  supple, FROM.  No JVD.  Trachea midline  Lungs:  Respiratory effort normal. Auscultation: Clear, without wheezes, rhonchi, rales  Heart:  Regular rate and rhythm, without murmur, gallop, rub.  No pedal edema  Abdomen: tenderness in epigastric region  Musculoskeletal:  muscle strength/tone is normal.    Psyc:  alert, oriented x 3.  Mood and affect are appropriate  skin:  Warm with good turgor.  Without rash or lesion  extremities:  Edema of lower extremities; Examination of the extremities revealed no cyanosis, clubbing     Results Review:                     Invalid input(s): PROTEstimated Creatinine Clearance: 50.1 mL/min (A) (by C-G formula based on SCr of 1.43 mg/dL (H)).  No results found for: AMMONIA      No results  found for: BLOODCX  No results found for: URINECX  No results found for: WOUNDCX  No results found for: STOOLCX    Imaging:  Imaging Results (Last 24 Hours)     ** No results found for the last 24 hours. **                Impression:  Patient Active Problem List   Diagnosis Code   • CAD (coronary artery disease) I25.10   • Type 2 diabetes mellitus with peripheral neuropathy (CMS/HCC) E11.42   • Mixed hyperlipidemia E78.2   • Essential hypertension I10   • Psoriasis L40.9   • History of nephrolithiasis Z87.442   • Gastroesophageal reflux disease without esophagitis K21.9   • Healthcare maintenance Z00.00   • History of adenomatous polyp of colon Z86.010   • Erectile dysfunction N52.9   • B12 deficiency E53.8   • Paroxysmal atrial fibrillation (CMS/HCC) I48.0   • Ptosis of both eyelids H02.403   • Chronic right shoulder pain M25.511, G89.29   • Elevated liver enzymes R74.8   • Vertigo R42   • Elevated TSH R79.89   • NAFLD (nonalcoholic fatty liver disease) K76.0   • DDD (degenerative disc disease), lumbar M51.36   • Abnormal CT scan, stomach R93.3   • Epigastric abdominal pain R10.13   • Bloating R14.0   • Elevated alkaline phosphatase in  P09, R74.8       Assessment:  1.  Gastric wall thickening per CT scan  2.  Non-alcoholic cirrhosis of the liver  3.  Increased liver enzymes  4.  Acid reflux    Plan:  The patient will undergo an EGD.  The procedure was explained to the patient who voiced understanding and agreement.    MAILE Che  20  13:21

## 2020-08-17 NOTE — OP NOTE
ESOPHAGOGASTRODUODENOSCOPY PROCEDURE REPORT    Aaron Delgado  8/17/2020    GASTROENTEROLOGIST:  Andrew Barnett MD    PRE-PROCEDURE DIAGNOSIS:  Abdominal pain  Abnormal imaging study.  Thickened stomach on CAT scan    POST-PROCEDURE DIAGNOSIS:  1.-  Normal esophagus.  Esophageal varices not identified  2.-  Normal stomach.  Antral biopsies taken to rule out H. pylori  3.-  Normal duodenum    INDICATION:  Abnormal thickening of the stomach on CAT scan.  Abdominal pain    Procedure(s):  ESOPHAGOGASTRODUODENOSCOPY WITH BIOPSY CPT CODE: 54951    ANESTHESIA:  Propofol administered by anesthesia.  See anesthesia notes for ASA classification    STAFF:  Circulator: Claudine Martini RN  Scrub Person: Maribell Leon    FINDINGS:  As noted in the post procedure diagnosis    OPERATIVE PROCEDURE:  After proper informed consent was obtained, patient was transferred to the OR/endoscopy suite.  Patient was then placed in left lateral decubitus position. The Olympus 180 series video gastroscope was inserted orally under direct visualization.  Esophagus, stomach, and duodenum were inspected.  The endoscope was passed to the third portion of the duodenum.  Scope was retroflexed for visualization of the cardia and incisuraThe endoscope was then withdrawn. Patient tolerated the procedure well. There were no immediate complications.    ESTIMATED BLOOD LOSS:  None    SPECIMENS:  Antral gastric biopsies               COMPLICATIONS;  None    RECOMMENDATIONS/ PLAN:  Await pathology report  Return to GI clinic for further work-up/evaluation    Andrew Barnett MD     08/17/20 2:24 PM

## 2020-08-17 NOTE — ANESTHESIA PREPROCEDURE EVALUATION
Anesthesia Evaluation     Patient summary reviewed and Nursing notes reviewed                Airway   Mallampati: II  TM distance: >3 FB  Neck ROM: full  No difficulty expected  Dental - normal exam     Pulmonary - negative pulmonary ROS and normal exam    breath sounds clear to auscultation  Cardiovascular - normal exam  Exercise tolerance: good (4-7 METS)    NYHA Classification: II  ECG reviewed  PT is on anticoagulation therapy  Patient on routine beta blocker and Beta blocker given within 24 hours of surgery  Rhythm: regular  Rate: normal    (+) hypertension, CAD, CABG, dysrhythmias Atrial Fib, CHF , hyperlipidemia,       Neuro/Psych  (+) dizziness/light headedness, numbness,     GI/Hepatic/Renal/Endo    (+)  GERD well controlled,  liver disease, diabetes mellitus well controlled,     Musculoskeletal     Abdominal  - normal exam    Bowel sounds: normal.   Substance History - negative use     OB/GYN negative ob/gyn ROS         Other   arthritis,                      Anesthesia Plan    ASA 3     general     intravenous induction     Anesthetic plan, all risks, benefits, and alternatives have been provided, discussed and informed consent has been obtained with: patient.  Use of blood products discussed with patient .

## 2020-08-21 NOTE — ANESTHESIA POSTPROCEDURE EVALUATION
Patient: Aaron Delgado    Procedure Summary     Date:  20 Room / Location:   [facility]    Anesthesia Start:   Anesthesia Stop:      Procedure:  ESOPHAGOGASTRODUODENOSCOPY WITH BIOPSY CPT CODE: 74853 (N/A Esophagus) Diagnosis:       Abnormal CT scan, stomach      Epigastric abdominal pain      Bloating      Elevated alkaline phosphatase in       (Abnormal CT scan, stomach [R93.3])      (Epigastric abdominal pain [R10.13])      (Bloating [R14.0])      (Elevated alkaline phosphatase in  [P09, R74.8])    Surgeon:  Andrew Barnett MD Provider:  Mala Rosenthal CRNA    Anesthesia Type:  general ASA Status:  3          Anesthesia Type: general    Vitals  Vitals Value Taken Time   /67 2020  2:55 PM   Temp 97.2 °F (36.2 °C) 2020  2:30 PM   Pulse 61 2020  2:55 PM   Resp 20 2020  2:55 PM   SpO2 94 % 2020  2:55 PM           Post Anesthesia Care and Evaluation    Patient location during evaluation: PHASE II  Patient participation: complete - patient participated  Level of consciousness: awake and alert  Pain score: 1  Pain management: adequate  Airway patency: patent  Anesthetic complications: No anesthetic complications  PONV Status: controlled  Cardiovascular status: acceptable  Respiratory status: acceptable  Hydration status: acceptable

## 2020-09-17 NOTE — PROGRESS NOTES
: 1945    Chief Complaint   Patient presents with   • Elevated Hepatic Enzymes   • Results     EGD and labs       Aaron Delgado is a 75 y.o. male who presents to the office today as a follow up appointment regarding Elevated Hepatic Enzymes and Results (EGD).    History of Present Illness:  He would like to discuss recent results of EGD and labs. He had EGD due to previous abnormal CT scan of the stomach, epigastric abdominal pain and bloating. He is feeling well today without complaints of abdominal pain. His liver enzymes have been elevated for several months now, mostly has elevated alkaline phosphatase. He completed liver workup last visit and other labs at previous GI visits. He has no history of cirrhosis but admits past history of fatty liver disease. He does not drink alcohol. He denies any prior alcoholism. He states he was diagnosed with NAFLD last year by Dr. Fine (GI) and does have a history of diabetes and elevated cholesterol. He has had recent labs (a few days ago) with PCP. He does not have his gallbladder.  There is no known family history of liver disease. 2020, he had colonoscopy with Dr. Fine in [place], had 2 adenomatous colon polyps removed at that time. He denies any heartburn, does not take any PPIs.     Review of Systems   Constitutional: Negative.    HENT: Negative for sore throat and trouble swallowing.    Eyes: Negative.    Respiratory: Negative for chest tightness.    Cardiovascular: Negative for chest pain.   Gastrointestinal: Negative for abdominal distention, abdominal pain, anal bleeding, blood in stool, constipation, diarrhea, nausea, rectal pain and vomiting.   Endocrine: Negative.    Genitourinary: Negative.    Musculoskeletal: Negative for back pain and neck pain.   Skin: Negative.  Negative for rash.   Allergic/Immunologic: Negative for environmental allergies and food allergies.   Neurological: Positive for dizziness. Negative for headaches.   Hematological:  Bruises/bleeds easily.   Psychiatric/Behavioral: Negative for agitation and sleep disturbance. The patient is not nervous/anxious.      I have reviewed and confirmed the accuracy of the ROS as documented by the MA/LPN/RN Selina Alston PA-C    Past Medical History:   Diagnosis Date   • A-fib (CMS/HCC)    • Arthritis    • CHF (congestive heart failure) (CMS/HCC)    • Coronary artery disease    • Diabetes mellitus (CMS/HCC)    • Elevated cholesterol    • GERD (gastroesophageal reflux disease)    • Hyperlipidemia    • Hypertension    • Non-alcoholic cirrhosis (CMS/HCC)        Past Surgical History:   Procedure Laterality Date   • CHOLECYSTECTOMY     • COLONOSCOPY  01/2020    Dr. Fine- tubular adenomatous colon polyps x2   • CORONARY ARTERY BYPASS GRAFT     • ENDOSCOPY     • ENDOSCOPY N/A 8/17/2020    Procedure: ESOPHAGOGASTRODUODENOSCOPY WITH BIOPSY CPT CODE: 53622;  Surgeon: Andrew Barnett MD;  Location: [facility];  Service: Gastroenterology;  Laterality: N/A;       History reviewed. No pertinent family history.    Social History     Socioeconomic History   • Marital status:      Spouse name: Not on file   • Number of children: Not on file   • Years of education: Not on file   • Highest education level: Not on file   Occupational History   • Occupation: Retired   Social Needs   • Financial resource strain: Patient refused   • Food insecurity     Worry: Patient refused     Inability: Patient refused   • Transportation needs     Medical: Patient refused     Non-medical: Patient refused   Tobacco Use   • Smoking status: Never Smoker   • Smokeless tobacco: Never Used   Substance and Sexual Activity   • Alcohol use: No     Frequency: Never   • Drug use: No   • Sexual activity: Defer   Lifestyle   • Physical activity     Days per week: Patient refused     Minutes per session: Patient refused   • Stress: Patient refused   Relationships   • Social connections     Talks on phone: Patient refused     Gets  together: Patient refused     Attends Mandae[employer] service: Patient refused     Active member of club or organization: Patient refused     Attends meetings of clubs or organizations: Patient refused     Relationship status: Patient refused       Current Outpatient Medications:   •  amiodarone (PACERONE) 200 MG tablet, Take 1 tablet by mouth Daily., Disp: 90 tablet, Rfl: 3  •  amLODIPine (NORVASC) 10 MG tablet, Take 1 tablet by mouth Every Evening., Disp: 30 tablet, Rfl: 5  •  benazepril (LOTENSIN) 40 MG tablet, Take 1 tablet by mouth Daily., Disp: 90 tablet, Rfl: 3  •  ELIQUIS 5 MG tablet tablet, TAKE 1 TABLET EVERY 12 HOURS, Disp: 180 tablet, Rfl: 3  •  fluticasone (CUTIVATE) 0.05 % cream, Apply  topically to the appropriate area as directed 2 (Two) Times a Day., Disp: 30 g, Rfl: 0  •  levothyroxine (SYNTHROID, LEVOTHROID) 50 MCG tablet, Take 1 tablet by mouth Daily., Disp: 90 tablet, Rfl: 3  •  metFORMIN ER (GLUCOPHAGE-XR) 500 MG 24 hr tablet, TAKE 4 TABLETS DAILY WITH BREAKFAST, Disp: 360 tablet, Rfl: 3  •  metoprolol succinate XL (TOPROL-XL) 50 MG 24 hr tablet, TAKE 1 TABLET EVERY NIGHT, Disp: 90 tablet, Rfl: 3  •  propafenone (RYTHMOL) 225 MG tablet, Take 1 tablet by mouth Every 8 (Eight) Hours., Disp: 270 tablet, Rfl: 3  •  rosuvastatin (CRESTOR) 20 MG tablet, TAKE 1 TABLET EVERY NIGHT, Disp: 90 tablet, Rfl: 3  •  BISACODYL 5 MG EC tablet, TK 4 TS PO FOR1 DOSE PRF CONSTIPATION, Disp: , Rfl:   •  clotrimazole-betamethasone (LOTRISONE) 1-0.05 % cream, Apply  topically to the appropriate area as directed 2 (Two) Times a Day., Disp: 45 g, Rfl: 0  •  doxazosin (CARDURA) 2 MG tablet, 1/2 po nightly for 1 week then 1 po nightly afterward, Disp: 90 tablet, Rfl: 3  •  Empagliflozin-linaGLIPtin (GLYXAMBI) 25-5 MG tablet, Take 1 tablet by mouth Every Morning., Disp: 90 tablet, Rfl: 3  •  fluconazole (Diflucan) 150 MG tablet, Take 1 tablet by mouth Daily As Needed (yeast infection)., Disp: 7 tablet, Rfl: 0  •  lidocaine  "(LIDODERM) 5 %, Place 1 patch on the skin as directed by provider Daily. Remove & Discard patch within 12 hours or as directed by MD, Disp: 90 patch, Rfl: 1  •  LORazepam (ATIVAN) 0.5 MG tablet, Take 1 tablet by mouth Every 8 (Eight) Hours As Needed (DIZZINESS)., Disp: 10 tablet, Rfl: 0  •  magnesium citrate 1.745 GM/30ML solution solution,  ML PO FOR 1 DOSE, Disp: , Rfl:   •  ondansetron (ZOFRAN) 4 MG tablet, Take 1 tablet by mouth Every 8 (Eight) Hours As Needed for Vomiting., Disp: 30 tablet, Rfl: 0  •  sildenafil (VIAGRA) 100 MG tablet, Take 1 tablet by mouth Daily As Needed for Erectile Dysfunction., Disp: 30 tablet, Rfl: 2  •  vitamin B-12 (CYANOCOBALAMIN) 1000 MCG tablet, Take 1 tablet by mouth Daily., Disp: 30 tablet, Rfl: 5    Allergies:   Patient has no known allergies.    Vitals:  /63   Pulse 61   Temp 97.5 °F (36.4 °C)   Ht 175.3 cm (69\")   Wt 86.1 kg (189 lb 12.8 oz)   SpO2 99%   BMI 28.03 kg/m²     Physical Exam  Constitutional:       General: He is not in acute distress.     Appearance: Normal appearance. He is well-developed. He is not diaphoretic.   HENT:      Head: Normocephalic and atraumatic.      Right Ear: External ear normal.      Left Ear: External ear normal.      Nose: Nose normal.      Mouth/Throat:      Comments: Wearing a mask  Eyes:      General: No scleral icterus.        Right eye: No discharge.         Left eye: No discharge.      Conjunctiva/sclera: Conjunctivae normal.   Neck:      Musculoskeletal: Normal range of motion.      Trachea: No tracheal deviation.   Pulmonary:      Effort: Pulmonary effort is normal. No respiratory distress.   Musculoskeletal: Normal range of motion.   Skin:     Coloration: Skin is not pale.      Findings: No erythema or rash.   Neurological:      Mental Status: He is alert and oriented to person, place, and time.      Coordination: Coordination normal.   Psychiatric:         Mood and Affect: Mood normal.         Behavior: Behavior " normal.         Thought Content: Thought content normal.         Judgment: Judgment normal.     Results Review:  EGD completed on 8/17/2020 by Dr. Martels.  Findings were normal esophagus, normal stomach, normal duodenum.  Labs showed mild chronic gastritis without H. pylori.    CT scan abd/pelv with contrast 7/13/2020:  1. There is trace free fluid in the right lower quadrant, but no  evidence of appendicitis otherwise.  2. Gastric wall is slightly thickened.  3. Sigmoid diverticuli.  4. Prostate enlargement.  Liver normal     Labs 7/16/2020: 4 1 antitrypsin normal, ceruloplasmin 31.7, IgG IgA and IgM normal, tissue transglutaminase normal, AGUILERA fibro-sure fibrosis stage F4, inflammation S3 which is compatible with marked or severe steatosis, Borderline or probable AGUILERA, GGT elevated 841, , , cholesterol total 96, glucose 170, triglycerides 129, alkaline phosphatase 467.  GFR 59.    Labs 2020: Antimitochondrial antibody normal, anti-smooth muscle antibody normal, TSH elevated, CBC with normal hematocrit and hemoglobin, normal platelets, iron panel previously normal.  Labs in 2019 showed acute hepatitis panel negative for hepatitis A, B, and C, GGT previously elevated at 173.  It is noted that TSH has been elevated all the way back through the entire year of 2019.      Labs 6/26/2020: GFR 55, alk phos 422, , ALT 80, total bilirubin 0.5, creatinine 1.28, total cholesterol 90,   triglycerides 141, LDL cholesterol 33, TSH elevated 19.800, hemoglobin A1c 6.10.     AP, AST and ALT trend:  Results for KRIS STEELE (MRN [id number]) as of 7/1/2020 14:01    Ref. Range 6/6/2019 14:01 6/28/2019 14:04 7/5/2019 09:25 8/1/2019 11:00 9/20/2019 00:00 11/27/2019 16:23 12/10/2019 19:24 12/13/2019 16:00 6/26/2020 10:42   ALT (SGPT) Latest Ref Range: 1 - 41 U/L 73 (H) 56 (H) 37 58 (H) 45 (H) 68 (H) 70 (H) 62 (H) 80 (H)   AST (SGOT) Latest Ref Range: 1 - 40 U/L 71 (H) 53 (H) 40 65 (H) 63 (H) 120 (H) 106 (H)  88 (H) 144 (H)   Alkaline Phosphatase Latest Ref Range: 39 - 117 U/L 203 (H) 149 (H) 115 129 (H) 121 (H) 192 (H) 205 (H) 199 (H) 422 (H)        Assessment:  1. Elevated alkaline phosphatase level    2. Abnormal AST and ALT    3. Elevated TSH    4. NAFLD (nonalcoholic fatty liver disease)      Plan:  Discussed all of his recent labs with him in detail. He has fatty liver and possible AGUILERA. His cholesterol and glucose have been ok. He recently started a new medication for treatment of hypothyroidism. He has not had repeat labs yet. All liver workup benign except for finding of fatty liver with likely early cirrhosis of the liver. I reviewed imaging again and looks like there is mild nodularity of the liver but it is still normal size and labs indicate normal liver function. Elevated alkaline phosphatase is concerning. If AST and ALT continue to be elevated along with AP on next labs with normal TSH, he will consider having a liver biopsy for further evaluation.         Return in about 2 months (around 11/17/2020) for recheck liver enzymes.      Electronically signed 9/18/2020 12:58 EDT  Selina Alston PA-C  [facility]

## 2020-09-18 PROBLEM — R14.0 BLOATING: Status: RESOLVED | Noted: 2020-01-01 | Resolved: 2020-01-01

## 2020-09-18 PROBLEM — R74.8 ELEVATED ALKALINE PHOSPHATASE IN NEWBORN: Status: RESOLVED | Noted: 2020-01-01 | Resolved: 2020-01-01

## 2020-09-18 PROBLEM — R93.3 ABNORMAL CT SCAN, STOMACH: Status: RESOLVED | Noted: 2020-01-01 | Resolved: 2020-01-01

## 2020-09-25 PROBLEM — E06.3 HYPOTHYROIDISM DUE TO HASHIMOTO'S THYROIDITIS: Status: ACTIVE | Noted: 2020-01-01

## 2020-09-25 PROBLEM — R79.89 ELEVATED TSH: Status: RESOLVED | Noted: 2019-07-18 | Resolved: 2020-01-01

## 2020-09-25 PROBLEM — E03.8 HYPOTHYROIDISM DUE TO HASHIMOTO'S THYROIDITIS: Status: ACTIVE | Noted: 2020-01-01

## 2020-09-25 PROBLEM — Z23 ENCOUNTER FOR IMMUNIZATION: Status: ACTIVE | Noted: 2020-01-01

## 2020-09-25 PROBLEM — R74.8 ELEVATED LIVER ENZYMES: Status: RESOLVED | Noted: 2019-06-08 | Resolved: 2020-01-01

## 2020-09-25 NOTE — PROGRESS NOTES
The ABCs of the Annual Wellness Visit  Subsequent Medicare Wellness Visit    Subjective   History of Present Illness:  Aaron Delgado is a 75 y.o. male who presents for a Subsequent Medicare Wellness Visit.    NAFLD  He has a history of elevated hepatic transaminases. He denies any difficulty swallowing, nausea, vomiting, abdominal pain, or change in his bowel habits and there is no history of any hematochezia or melena.  Ultrasound of the abdomen performed on 6/17/2019 was unremarkable.  Contrast CT of the abdomen performed on 7/13/2020 was reported as showing slight thickening of the gastric wall, sigmoid diverticuli, and prostate enlargement.  He underwent an EGD by Dr. Barnett on 8/17/2020 with no abnormalities found.  Biopsies at the antrum revealed mild gastritis.  He continues to be followed by gastroenterology and will undergo reassessment on 11/17/2020    Type 2 Diabetes Mellitus  He admits to mild numbness and tingling of both feet.  There is no history of any polyuria, polydipsia, skin breakdown, or hypoglycemia.  He remains on metformin, linagliptin, and empagliflozin (together as glyxambi). His last diabetic eye exam was within 1 year.    Lab Results   Component Value Date    HGBA1C 6.00 (H) 09/25/2020     Lab Results   Component Value Date    MICROALBUR 11.9 06/26/2020     Hyperlipidemia  He remains on simvastatin, and ezetimibewith no apparent side effects.  Lab Results   Component Value Date    CHLPL 77 09/25/2020    TRIG 141 09/25/2020    HDL 24 (L) 09/25/2020    LDL 25 09/25/2020     Hypertension  He remains on benazepril, metoprololm amlodipine, and doxazosin.  His blood pressure has consistently been at goal.    Lab Results   Component Value Date    GLUCOSE 156 (H) 12/10/2019    BUN 11 09/25/2020    CREATININE 1.53 (H) 09/25/2020    EGFRIFNONA 45 (L) 09/25/2020    EGFRIFAFRI 54 (L) 09/25/2020    BCR 7.2 09/25/2020    K 4.7 09/25/2020    CO2 21.1 (L) 09/25/2020    CALCIUM 9.0 09/25/2020     PROTENTOTREF 6.3 09/25/2020    ALBUMIN 3.30 (L) 09/25/2020    LABIL2 1.1 09/25/2020     (H) 09/25/2020    ALT 56 (H) 09/25/2020     Hypothyroidism  He was started on levothyroxine 50 mcg daily following his most recent labs.  He denies any apparent side effects  TSH   Date Value Ref Range Status   09/25/2020 30.100 (H) 0.270 - 4.200 uIU/mL Final   12/10/2019 8.270 (H) 0.270 - 4.200 uIU/mL Final     Atrial Fibrillation  Patient has a history of atrial fibrillation. The patient denies palpitations, lightheadedness, diaphoresis or syncope. The patient has a past history of CAD, DM, HTN and hyperlipidemia.  He remains on apixaban. He denies  bleeding.  He continues to be followed by cardiology   Lab Results   Component Value Date    WBC 7.02 09/25/2020    HGB 12.7 (L) 09/25/2020    HCT 38.3 09/25/2020    MCV 88.0 09/25/2020     09/25/2020     Coronary Artery Disease  S/P CABG.  He has noted a continued mprovement in his lower extremity edema since last here.  He continues to deny any chest pain or palpitations and there is no history of any lightheadedness, or shortness of breath.     Dizziness  He gives a more than 1 year history of intermittent dizziness.  This has been relatively stable since last here.  The dizziness is described as a sense of movement that occurs with changes in position and resolves promptly with rest.  To date he has had no associated symptoms and specifically denies any hearing loss, tinnitus, or difficulty with his eye hand coordination.  MRI of the brain performed on 2/18/2019 was unremarkable.  He has undergone an ENT assessment     HEALTH RISK ASSESSMENT    Recent Hospitalizations:  No hospitalization(s) within the last year.    Current Medical Providers:  Patient Care Team:  Dillan Nur MD as PCP - General (Family Medicine)    Smoking Status:  Social History     Tobacco Use   Smoking Status Never Smoker   Smokeless Tobacco Never Used       Alcohol  Consumption:  Social History     Substance and Sexual Activity   Alcohol Use No   • Frequency: Never       Depression Screen:   PHQ-2/PHQ-9 Depression Screening 9/25/2020   Little interest or pleasure in doing things 0   Feeling down, depressed, or hopeless 0   Total Score 0       Fall Risk Screen:  KURT Fall Risk Assessment was completed, and patient is at MODERATE risk for falls. Assessment completed on:1/28/2020    Health Habits and Functional and Cognitive Screening:  Functional & Cognitive Status 9/25/2020   Do you have difficulty preparing food and eating? No   Do you have difficulty bathing yourself, getting dressed or grooming yourself? No   Do you have difficulty using the toilet? No   Do you have difficulty moving around from place to place? No   Do you have trouble with steps or getting out of a bed or a chair? No   Current Diet Well Balanced Diet   Dental Exam Up to date   Eye Exam Up to date   Exercise (times per week) 7 times per week   Current Exercise Activities Include Walking   Do you need help using the phone?  No   Are you deaf or do you have serious difficulty hearing?  No   Do you need help with transportation? No   Do you need help shopping? No   Do you need help preparing meals?  No   Do you need help with housework?  No   Do you need help with laundry? No   Do you need help taking your medications? No   Do you need help managing money? No   Do you ever drive or ride in a car without wearing a seat belt? No   Have you felt unusual stress, anger or loneliness in the last month? No   Who do you live with? Spouse   If you need help, do you have trouble finding someone available to you? No   Have you been bothered in the last four weeks by sexual problems? No   Do you have difficulty concentrating, remembering or making decisions? No     Does the patient have evidence of cognitive impairment? No    Asprin use counseling: he is not on ASA due to chronic anticoagulation.  He was encouraged to  discuss this with his cardiologist again at his reassessment.    Age-appropriate Screening Schedule:  Refer to the list below for future screening recommendations based on patient's age, sex and/or medical conditions. Orders for these recommended tests are listed in the plan section. The patient has been provided with a written plan.    Health Maintenance   Topic Date Due   • DIABETIC FOOT EXAM  05/24/2019   • DIABETIC EYE EXAM  08/05/2020   • HEMOGLOBIN A1C  12/26/2020   • LIPID PANEL  06/26/2021   • URINE MICROALBUMIN  06/26/2021   • COLONOSCOPY  01/23/2025   • TDAP/TD VACCINES (2 - Td) 01/01/2026   • INFLUENZA VACCINE  Completed   • ZOSTER VACCINE  Discontinued          The following portions of the patient's history were reviewed and updated as appropriate: allergies, current medications, past family history, past medical history, past social history, past surgical history and problem list.    Outpatient Medications Prior to Visit   Medication Sig Dispense Refill   • amiodarone (PACERONE) 200 MG tablet Take 1 tablet by mouth Daily. 90 tablet 3   • amLODIPine (NORVASC) 10 MG tablet Take 1 tablet by mouth Every Evening. 30 tablet 5   • benazepril (LOTENSIN) 40 MG tablet Take 1 tablet by mouth Daily. 90 tablet 3   • clotrimazole-betamethasone (LOTRISONE) 1-0.05 % cream Apply  topically to the appropriate area as directed 2 (Two) Times a Day. 45 g 0   • doxazosin (CARDURA) 2 MG tablet 1/2 po nightly for 1 week then 1 po nightly afterward 90 tablet 3   • ELIQUIS 5 MG tablet tablet TAKE 1 TABLET EVERY 12 HOURS 180 tablet 3   • Empagliflozin-linaGLIPtin (GLYXAMBI) 25-5 MG tablet Take 1 tablet by mouth Every Morning. 90 tablet 3   • fluticasone (CUTIVATE) 0.05 % cream Apply  topically to the appropriate area as directed 2 (Two) Times a Day. 30 g 0   • lidocaine (LIDODERM) 5 % Place 1 patch on the skin as directed by provider Daily. Remove & Discard patch within 12 hours or as directed by MD Serrato patch 1   • LORazepam  (ATIVAN) 0.5 MG tablet Take 1 tablet by mouth Every 8 (Eight) Hours As Needed (DIZZINESS). 10 tablet 0   • metFORMIN ER (GLUCOPHAGE-XR) 500 MG 24 hr tablet TAKE 4 TABLETS DAILY WITH BREAKFAST 360 tablet 3   • metoprolol succinate XL (TOPROL-XL) 50 MG 24 hr tablet TAKE 1 TABLET EVERY NIGHT 90 tablet 3   • propafenone (RYTHMOL) 225 MG tablet Take 1 tablet by mouth Every 8 (Eight) Hours. 270 tablet 3   • rosuvastatin (CRESTOR) 20 MG tablet TAKE 1 TABLET EVERY NIGHT 90 tablet 3   • sildenafil (VIAGRA) 100 MG tablet Take 1 tablet by mouth Daily As Needed for Erectile Dysfunction. 30 tablet 2   • vitamin B-12 (CYANOCOBALAMIN) 1000 MCG tablet Take 1 tablet by mouth Daily. 30 tablet 5   • levothyroxine (SYNTHROID, LEVOTHROID) 50 MCG tablet Take 1 tablet by mouth Daily. 90 tablet 3   • BISACODYL 5 MG EC tablet TK 4 TS PO FOR1 DOSE PRF CONSTIPATION     • fluconazole (Diflucan) 150 MG tablet Take 1 tablet by mouth Daily As Needed (yeast infection). 7 tablet 0   • magnesium citrate 1.745 GM/30ML solution solution  ML PO FOR 1 DOSE     • ondansetron (ZOFRAN) 4 MG tablet Take 1 tablet by mouth Every 8 (Eight) Hours As Needed for Vomiting. 30 tablet 0     Facility-Administered Medications Prior to Visit   Medication Dose Route Frequency Provider Last Rate Last Dose   • cyanocobalamin injection 1,000 mcg  1,000 mcg Intramuscular Q28 Days Dillan Nur MD   1,000 mcg at 06/06/19 1359       Patient Active Problem List   Diagnosis   • CAD (coronary artery disease)   • Type 2 diabetes mellitus with peripheral neuropathy (CMS/HCC)   • Mixed hyperlipidemia   • Essential hypertension   • Psoriasis   • History of nephrolithiasis   • Gastroesophageal reflux disease without esophagitis   • Healthcare maintenance   • History of adenomatous polyp of colon   • Erectile dysfunction   • B12 deficiency   • Paroxysmal atrial fibrillation (CMS/HCC)   • Ptosis of both eyelids   • Chronic right shoulder pain   • Vertigo   • NAFLD  "(nonalcoholic fatty liver disease)   • DDD (degenerative disc disease), lumbar   • Hypothyroidism due to Hashimoto's thyroiditis   • Encounter for immunization       Advanced Care Planning:  ACP discussion was held with the patient during this visit. Patient does not have an advance directive, information provided.    Review of Systems   Constitutional: Negative for chills, fatigue and fever.   HENT: Negative for congestion, ear pain, rhinorrhea, sneezing, sore throat and voice change.    Eyes: Negative for visual disturbance.   Respiratory: Negative for cough, shortness of breath and wheezing.    Cardiovascular: Negative for chest pain, palpitations and leg swelling.   Gastrointestinal: Negative for abdominal pain, blood in stool, constipation, diarrhea, nausea and vomiting.   Endocrine: Negative for polydipsia and polyuria.   Genitourinary: Negative for dysuria, frequency, hematuria and urgency.   Musculoskeletal: Positive for arthralgias. Negative for back pain and myalgias.   Skin: Negative for rash.   Neurological: Positive for dizziness. Negative for weakness, numbness and headaches.   Psychiatric/Behavioral: Negative for dysphoric mood and sleep disturbance. The patient is not nervous/anxious.      Compared to one year ago, the patient feels his physical health is the same.  Compared to one year ago, the patient feels his mental health is the same.    Reviewed chart for potential of high risk medication in the elderly: yes  Reviewed chart for potential of harmful drug interactions in the elderly:yes    Objective      Vitals:    09/25/20 1426   Pulse: 64   Temp: 97.3 °F (36.3 °C)   TempSrc: Temporal   SpO2: 96%   Weight: 86.6 kg (191 lb)   Height: 175.3 cm (69.02\")       Body mass index is 28.19 kg/m².  Discussed the patient's BMI with him. The BMI is above average; BMI management plan is completed.    Physical Exam  Constitutional:       General: He is not in acute distress.     Appearance: Normal appearance. " He is well-developed. He is not ill-appearing or diaphoretic.   HENT:      Head: Atraumatic.      Right Ear: Tympanic membrane, ear canal and external ear normal.      Left Ear: Tympanic membrane, ear canal and external ear normal.   Eyes:      Conjunctiva/sclera: Conjunctivae normal.   Neck:      Thyroid: No thyroid mass or thyromegaly.      Vascular: No carotid bruit or JVD.      Trachea: Trachea normal. No tracheal deviation.   Cardiovascular:      Rate and Rhythm: Normal rate and regular rhythm.      Heart sounds: Normal heart sounds, S1 normal and S2 normal. No murmur. No gallop. No S3 or S4 sounds.       Comments: Bilateral graded compression hose  Pulmonary:      Effort: Pulmonary effort is normal.      Breath sounds: Normal breath sounds.   Abdominal:      General: Bowel sounds are normal. There is no distension.      Palpations: Abdomen is soft. There is no hepatomegaly, splenomegaly or mass.      Tenderness: There is no abdominal tenderness.      Hernia: No hernia is present.   Musculoskeletal:      Right lower leg: No edema.      Left lower leg: No edema.   Lymphadenopathy:      Head:      Right side of head: No submental, submandibular, tonsillar, preauricular, posterior auricular or occipital adenopathy.      Left side of head: No submental, submandibular, tonsillar, preauricular, posterior auricular or occipital adenopathy.      Cervical: No cervical adenopathy.      Upper Body:      Right upper body: No supraclavicular adenopathy.      Left upper body: No supraclavicular adenopathy.   Skin:     General: Skin is warm and dry.      Coloration: Skin is not cyanotic, jaundiced or pale.      Findings: No rash.      Nails: There is no clubbing.     Neurological:      Mental Status: He is alert and oriented to person, place, and time.      Cranial Nerves: No cranial nerve deficit.      Sensory: Sensory deficit (decreased vibration sense both feet) present.      Motor: No tremor.      Coordination:  Coordination normal.      Gait: Gait normal.   Psychiatric:         Attention and Perception: Attention normal.         Mood and Affect: Mood normal.         Speech: Speech normal.         Behavior: Behavior normal.        Assessment/Plan   Medicare Risks and Personalized Health Plan  CMS Preventative Services Quick Reference  Advance Directive Discussion  Cardiovascular risk  Fall Risk  Immunizations Discussed/Encouraged (specific immunizations; Hepatitis A Vaccine/Series, Influenza, Pneumococcal 23 and Shingrix )  Inactivity/Sedentary  Obesity/Overweight   Osteoprorosis Risk    The above risks/problems have been discussed with the patient.  Pertinent information has been shared with the patient in the After Visit Summary.  Follow up plans and orders are seen below in the Assessment/Plan Section.    Diagnoses and all orders for this visit:    1. Coronary artery disease involving native coronary artery of native heart without angina pectoris   Reminded regarding the importance of risk factor modification.  Continue current medication  Follow up with cardiology     2. Paroxysmal atrial fibrillation (CMS/[facility])   Rate control is appropriate.. Patient is anticoagulated.   Continue current medication.  Follow up with cardiology     3. Mixed hyperlipidemia  Encouraged to continue to work on his diet and exercise plan.  Continue current medication  -     Lipid Panel    4. Essential hypertension  As above.   Continue current medication.  -     CBC & Differential  -     Comprehensive Metabolic Panel    5. Type 2 diabetes mellitus with peripheral neuropathy (CMS/[facility])  Diabetes mellitus Type II, under excellent control.   Encouraged to continue to pursue ADA diet  Encouraged aerobic exercise.  If his insurance agrees will replace glyxambi and metformin with trijardy xr  Updated labs drawn.  -     Hemoglobin A1c    6. NAFLD (nonalcoholic fatty liver disease)  Follow up with GI     7. Gastroesophageal reflux disease without  esophagitis    8. B12 deficiency  Continue supplementation with monitoring.    9. Vertigo  Encouraged to report if any worse or if any new symptoms or concerns.    10. Erectile dysfunction due to arterial insufficiency    11. DDD (degenerative disc disease), lumbar    12. Chronic right shoulder pain    13. Psoriasis    14. History of nephrolithiasis    15. History of adenomatous polyp of colon    16. Hypothyroidism due to Hashimoto's thyroiditis  Continue current medication  Will continue to monitor  -     TSH    17. Healthcare maintenance  Recommended a flu shot and Pneumovax  Reminded that he is due for hepatitis A #2 and Shingrix.  Prescriptions emailed to his pharmacy  -     Fluarix/Fluzone/Afluria Quad>6 Months  -     Pneumococcal Polysaccharide Vaccine 23-Valent Greater Than or Equal To 3yo Subcutaneous / IM  -     Zoster Vac Recomb Adjuvanted (Shingrix) 50 MCG/0.5ML reconstituted suspension; Inject 0.5 mL into the appropriate muscle as directed by prescriber See Admin Instructions. Repeat in 2-6 months  Dispense: 1 each; Refill: 1  -     hepatitis A (HAVRIX) 1440 EL U/ML vaccine; Inject 1 mL into the appropriate muscle as directed by prescriber 1 (One) Time for 1 dose.  Dispense: 1 mL; Refill: 0      Follow Up:  Return in about 4 months (around 1/14/2021).     An After Visit Summary and PPPS were given to the patient.

## 2020-09-25 NOTE — PATIENT INSTRUCTIONS
Advance Directive    Advance directives are legal documents that let you make choices ahead of time about your health care and medical treatment in case you become unable to communicate for yourself. Advance directives are a way for you to communicate your wishes to family, friends, and health care providers. This can help convey your decisions about end-of-life care if you become unable to communicate.  Discussing and writing advance directives should happen over time rather than all at once. Advance directives can be changed depending on your situation and what you want, even after you have signed the advance directives.  If you do not have an advance directive, some states assign family decision makers to act on your behalf based on how closely you are related to them. Each state has its own laws regarding advance directives. You may want to check with your health care provider, , or state representative about the laws in your state. There are different types of advance directives, such as:  · Medical power of .  · Living will.  · Do not resuscitate (DNR) or do not attempt resuscitation (DNAR) order.  Health care proxy and medical power of   A health care proxy, also called a health care agent, is a person who is appointed to make medical decisions for you in cases in which you are unable to make the decisions yourself. Generally, people choose someone they know well and trust to represent their preferences. Make sure to ask this person for an agreement to act as your proxy. A proxy may have to exercise judgment in the event of a medical decision for which your wishes are not known.  A medical power of  is a legal document that names your health care proxy. Depending on the laws in your state, after the document is written, it may also need to be:  · Signed.  · Notarized.  · Dated.  · Copied.  · Witnessed.  · Incorporated into your medical record.  You may also want to appoint  someone to manage your financial affairs in a situation in which you are unable to do so. This is called a durable power of  for finances. It is a separate legal document from the durable power of  for health care. You may choose the same person or someone different from your health care proxy to act as your agent in financial matters.  If you do not appoint a proxy, or if there is a concern that the proxy is not acting in your best interests, a court-appointed guardian may be designated to act on your behalf.  Living will  A living will is a set of instructions documenting your wishes about medical care when you cannot express them yourself. Health care providers should keep a copy of your living will in your medical record. You may want to give a copy to family members or friends. To alert caregivers in case of an emergency, you can place a card in your wallet to let them know that you have a living will and where they can find it. A living will is used if you become:  · Terminally ill.  · Incapacitated.  · Unable to communicate or make decisions.  Items to consider in your living will include:  · The use or non-use of life-sustaining equipment, such as dialysis machines and breathing machines (ventilators).  · A DNR or DNAR order, which is the instruction not to use cardiopulmonary resuscitation (CPR) if breathing or heartbeat stops.  · The use or non-use of tube feeding.  · Withholding of food and fluids.  · Comfort (palliative) care when the goal becomes comfort rather than a cure.  · Organ and tissue donation.  A living will does not give instructions for distributing your money and property if you should pass away. It is recommended that you seek the advice of a  when writing a will. Decisions about taxes, beneficiaries, and asset distribution will be legally binding. This process can relieve your family and friends of any concerns surrounding disputes or questions that may come up about  the distribution of your assets.  DNR or DNAR  A DNR or DNAR order is a request not to have CPR in the event that your heart stops beating or you stop breathing. If a DNR or DNAR order has not been made and shared, a health care provider will try to help any patient whose heart has stopped or who has stopped breathing. If you plan to have surgery, talk with your health care provider about how your DNR or DNAR order will be followed if problems occur.  Summary  · Advance directives are the legal documents that allow you to make choices ahead of time about your health care and medical treatment in case you become unable to communicate for yourself.  · The process of discussing and writing advance directives should happen over time. You can change the advance directives, even after you have signed them.  · Advance directives include DNR or DNAR orders, living delatorre, and designating an agent as your medical power of .  This information is not intended to replace advice given to you by your health care provider. Make sure you discuss any questions you have with your health care provider.  Document Released: 03/26/2009 Document Revised: 01/22/2020 Document Reviewed: 11/06/2017  Elsevier Patient Education © 2020 [employer]      Fall Prevention in the Home, Adult  Falls can cause injuries. They can happen to people of all ages. There are many things you can do to make your home safe and to help prevent falls. Ask for help when making these changes, if needed.  What actions can I take to prevent falls?  General Instructions  · Use good lighting in all rooms. Replace any light bulbs that burn out.  · Turn on the lights when you go into a dark area. Use night-lights.  · Keep items that you use often in easy-to-reach places. Lower the shelves around your home if necessary.  · Set up your furniture so you have a clear path. Avoid moving your furniture around.  · Do not have throw rugs and other things on the floor  that can make you trip.  · Avoid walking on wet floors.  · If any of your floors are uneven, fix them.  · Add color or contrast paint or tape to clearly carla and help you see:  ? Any grab bars or handrails.  ? First and last steps of stairways.  ? Where the edge of each step is.  · If you use a stepladder:  ? Make sure that it is fully opened. Do not climb a closed stepladder.  ? Make sure that both sides of the stepladder are locked into place.  ? Ask someone to hold the stepladder for you while you use it.  · If there are any pets around you, be aware of where they are.  What can I do in the bathroom?         · Keep the floor dry. Clean up any water that spills onto the floor as soon as it happens.  · Remove soap buildup in the tub or shower regularly.  · Use non-skid mats or decals on the floor of the tub or shower.  · Attach bath mats securely with double-sided, non-slip rug tape.  · If you need to sit down in the shower, use a plastic, non-slip stool.  · Install grab bars by the toilet and in the tub and shower. Do not use towel bars as grab bars.  What can I do in the bedroom?  · Make sure that you have a light by your bed that is easy to reach.  · Do not use any sheets or blankets that are too big for your bed. They should not hang down onto the floor.  · Have a firm chair that has side arms. You can use this for support while you get dressed.  What can I do in the kitchen?  · Clean up any spills right away.  · If you need to reach something above you, use a strong step stool that has a grab bar.  · Keep electrical cords out of the way.  · Do not use floor polish or wax that makes floors slippery. If you must use wax, use non-skid floor wax.  What can I do with my stairs?  · Do not leave any items on the stairs.  · Make sure that you have a light switch at the top of the stairs and the bottom of the stairs. If you do not have them, ask someone to add them for you.  · Make sure that there are handrails on both  sides of the stairs, and use them. Fix handrails that are broken or loose. Make sure that handrails are as long as the stairways.  · Install non-slip stair treads on all stairs in your home.  · Avoid having throw rugs at the top or bottom of the stairs. If you do have throw rugs, attach them to the floor with carpet tape.  · Choose a carpet that does not hide the edge of the steps on the stairway.  · Check any carpeting to make sure that it is firmly attached to the stairs. Fix any carpet that is loose or worn.  What can I do on the outside of my home?  · Use bright outdoor lighting.  · Regularly fix the edges of walkways and driveways and fix any cracks.  · Remove anything that might make you trip as you walk through a door, such as a raised step or threshold.  · Trim any bushes or trees on the path to your home.  · Regularly check to see if handrails are loose or broken. Make sure that both sides of any steps have handrails.  · Install guardrails along the edges of any raised decks and porches.  · Clear walking paths of anything that might make someone trip, such as tools or rocks.  · Have any leaves, snow, or ice cleared regularly.  · Use sand or salt on walking paths during winter.  · Clean up any spills in your garage right away. This includes grease or oil spills.  What other actions can I take?  · Wear shoes that:  ? Have a low heel. Do not wear high heels.  ? Have rubber bottoms.  ? Are comfortable and fit you well.  ? Are closed at the toe. Do not wear open-toe sandals.  · Use tools that help you move around (mobility aids) if they are needed. These include:  ? Canes.  ? Walkers.  ? Scooters.  ? Crutches.  · Review your medicines with your doctor. Some medicines can make you feel dizzy. This can increase your chance of falling.  Ask your doctor what other things you can do to help prevent falls.  Where to find more information  · Centers for Disease Control and PreventionKURT:  https://cdc.gov  · [place]: https://va1adgb.samson.nih.gov  Contact a doctor if:  · You are afraid of falling at home.  · You feel weak, drowsy, or dizzy at home.  · You fall at home.  Summary  · There are many simple things that you can do to make your home safe and to help prevent falls.  · Ways to make your home safe include removing tripping hazards and installing grab bars in the bathroom.  · Ask for help when making these changes in your home.  This information is not intended to replace advice given to you by your health care provider. Make sure you discuss any questions you have with your health care provider.  Document Released: 10/14/2010 Document Revised: 04/09/2020 Document Reviewed: 08/02/2018  Elsevier Patient Education © 2020 [employer]      Heart Disease Prevention  Heart disease is the leading cause of death in the world. Coronary artery disease is the most common cause of heart disease. This condition results when cholesterol and other substances (plaque) build up inside the walls of the blood vessels that supply your heart muscle (arteries). This buildup in arteries is called atherosclerosis. You can take actions to lower your risk of heart disease.  How can heart disease affect me?  Heart disease can cause many unpleasant symptoms and complications, such as:  · Chest pain (angina).  · Reduced or blocked blood flow to your heart. This can cause:  ? Irregular heartbeats (arrhythmias).  ? Heart attack.  ? Heart failure.  What can increase my risk?  The following factors may make you more likely to develop this condition:  · High blood pressure (hypertension).  · High cholesterol.  · Smoking.  · A diet high in saturated fats or trans fats.  · Lack of physical activity.  · Obesity.  · Drinking too much alcohol.  · Diabetes.  · Having a family history of heart disease.  What actions can I take to prevent heart disease?  Nutrition    · Eat a heart-healthy eating plan as told by your  health care provider. Examples include the DASH (Dietary Approaches to Stop Hypertension) eating plan or the Mediterranean diet.  · Generally, it is recommended that you:  ? Eat less salt (sodium). Ask your health care provider how much sodium is safe for you. Most people should have less than 2,300 mg each day.  ? Limit unhealthy fats, such as saturated and trans fats, in your diet. You can do this by eating low-fat dairy products, eating less red meat, and avoiding processed foods.  ? Eat healthy fats (omega-3 fatty acids). These are found in fish, such as mackerel or salmon.  ? Eat more fruits and vegetables. You should try to fill one-half of your plate with fruits and vegetables at each meal.  ? Eat more whole grains.  ? Avoid foods and drinks that have added sugars.  Lifestyle    · Get regular exercise. This is one of the most important things you can do for your health. Generally, it is recommended that you:  ? Exercise for at least 30 minutes on most days of the week (150 minutes each week). The exercise should increase your heart rate and make you sweat (aerobic exercise).  ? Add strength exercises on at least 2 days each week.  · Do not use any products that contain nicotine or tobacco, such as cigarettes and e-cigarettes. These can damage your heart and blood vessels. If you need help quitting, ask your health care provider.  Alcohol use  · Do not drink alcohol if:  ? Your health care provider tells you not to drink.  ? You are pregnant, may be pregnant, or are planning to become pregnant.  · If you drink alcohol, limit how much you have:  ? 0-1 drink a day for women.  ? 0-2 drinks a day for men.  · Be aware of how much alcohol is in your drink. In the [place] one drink equals one typical bottle of beer (12 oz), one-half glass of wine (5 oz), or one shot of hard liquor (1½ oz).  Medicines  · Take over-the-counter and prescription medicines only as told by your health care provider.  · Ask your health care  provider whether you should take an aspirin every day. Taking aspirin may help reduce your risk of heart disease and stroke.  · Depending on your risk factors, your health care provider may prescribe medicines to lower your risk of heart disease or to control related conditions. You may take medicine to:  ? Lower cholesterol.  ? Control blood pressure.  ? Control diabetes.  General information  · Keep your blood pressure under control, as recommended by your health care provider. For most healthy people, the upper number of your blood pressure (systolic) should be no higher than 120, and the lower number (diastolic) no higher than 80. Treatment may be needed if your blood pressure is higher than 130/80.  · Have your blood pressure checked at least every two years. Your health care provider may check your blood pressure more often if you have high blood pressure.  · After age 20, have your cholesterol checked every 4-6 years. If you have risk factors for heart disease, you may need to have it checked more frequently. Treatment may be needed if your cholesterol is high.  · Have your body mass index (BMI) checked every year. Your health care provider can calculate your BMI from your height and weight.  · Work with your health care provider to lose weight, if needed, or to maintain a healthy weight.  Where to find more information:  · Centers for Disease Control and Prevention: www.cdc.gov/heartdisease  · American Heart Association: www.heart.org  ? Take a free online heart disease risk quiz to better understand your personal risk factors.  Summary  · Heart disease is the leading cause of death in the world.  · Heart disease can cause chest pain, abnormal heart rhythms, heart attack, and heart failure.  · High blood pressure, high cholesterol, and smoking are the main risk factors for heart disease, although other factors also contribute.  · You can take actions to lower your chances of developing heart disease. Work  with your health care provider to reduce your risk by following a heart-healthy diet, being physically active, and controlling your weight, blood pressure, and cholesterol level.  This information is not intended to replace advice given to you by your health care provider. Make sure you discuss any questions you have with your health care provider.  Document Released: 08/01/2005 Document Revised: 01/02/2019 Document Reviewed: 01/02/2019  Elsevier Patient Education © 2020 [employer].      Osteoporosis    Osteoporosis is thinning and loss of density in your bones. Osteoporosis makes bones more brittle and fragile and more likely to break (fracture). Over time, osteoporosis can cause your bones to become so weak that they fracture after a minor fall. Bones in the hip, wrist, and spine are most likely to fracture due to osteoporosis.  What are the causes?  The exact cause of this condition is not known.  What increases the risk?  You may be at greater risk for osteoporosis if you:  · Have a family history of the condition.  · Have poor nutrition.  · Use steroid medicines, such as prednisone.  · Are female.  · Are age 50 or older.  · Smoke or have a history of smoking.  · Are not physically active (are sedentary).  · Are white () or of  descent.  · Have a small body frame.  · Take certain medicines, such as antiseizure medicines.  What are the signs or symptoms?  A fracture might be the first sign of osteoporosis, especially if the fracture results from a fall or injury that usually would not cause a bone to break. Other signs and symptoms include:  · Pain in the neck or low back.  · Stooped posture.  · Loss of height.  How is this diagnosed?  This condition may be diagnosed based on:  · Your medical history.  · A physical exam.  · A bone mineral density test, also called a DXA or DEXA test (dual-energy X-ray absorptiometry test). This test uses X-rays to measure the amount of minerals in your bones.  How  is this treated?  The goal of treatment is to strengthen your bones and lower your risk for a fracture. Treatment may involve:  · Making lifestyle changes, such as:  ? Including foods with more calcium and vitamin D in your diet.  ? Doing weight-bearing and muscle-strengthening exercises.  ? Stopping tobacco use.  ? Limiting alcohol intake.  · Taking medicine to slow the process of bone loss or to increase bone density.  · Taking daily supplements of calcium and vitamin D.  · Taking hormone replacement medicines, such as estrogen for women and testosterone for men.  · Monitoring your levels of calcium and vitamin D.  Follow these instructions at home:    Activity  · Exercise as told by your health care provider. Ask your health care provider what exercises and activities are safe for you. You should do:  ? Exercises that make you work against gravity (weight-bearing exercises), such as mackenzie chi, yoga, or walking.  ? Exercises to strengthen muscles, such as lifting weights.  Lifestyle  · Limit alcohol intake to no more than 1 drink a day for nonpregnant women and 2 drinks a day for men. One drink equals 12 oz of beer, 5 oz of wine, or 1½ oz of hard liquor.  · Do not use any products that contain nicotine or tobacco, such as cigarettes and e-cigarettes. If you need help quitting, ask your health care provider.  Preventing falls  · Use devices to help you move around (mobility aids) as needed, such as canes, walkers, scooters, or crutches.  · Keep rooms well-lit and clutter-free.  · Remove tripping hazards from walkways, including cords and throw rugs.  · Install grab bars in bathrooms and safety rails on stairs.  · Use rubber mats in the bathroom and other areas that are often wet or slippery.  · Wear closed-toe shoes that fit well and support your feet. Wear shoes that have rubber soles or low heels.  · Review your medicines with your health care provider. Some medicines can cause dizziness or changes in blood  pressure, which can increase your risk of falling.  General instructions  · Include calcium and vitamin D in your diet. Calcium is important for bone health, and vitamin D helps your body to absorb calcium. Good sources of calcium and vitamin D include:  ? Certain fatty fish, such as salmon and tuna.  ? Products that have calcium and vitamin D added to them (fortified products), such as fortified cereals.  ? Egg yolks.  ? Cheese.  ? Liver.  · Take over-the-counter and prescription medicines only as told by your health care provider.  · Keep all follow-up visits as told by your health care provider. This is important.  Contact a health care provider if:  · You have never been screened for osteoporosis and you are:  ? A woman who is age 65 or older.  ? A man who is age 70 or older.  Get help right away if:  · You fall or injure yourself.  Summary  · Osteoporosis is thinning and loss of density in your bones. This makes bones more brittle and fragile and more likely to break (fracture),even with minor falls.  · The goal of treatment is to strengthen your bones and reduce your risk for a fracture.  · Include calcium and vitamin D in your diet. Calcium is important for bone health, and vitamin D helps your body to absorb calcium.  · Talk with your health care provider about screening for osteoporosis if you are a woman who is age 65 or older, or a man who is age 70 or older.  This information is not intended to replace advice given to you by your health care provider. Make sure you discuss any questions you have with your health care provider.  Document Released: 09/27/2006 Document Revised: 11/30/2018 Document Reviewed: 10/12/2018  Elsevier Patient Education © 2020 [employer] Inc.

## 2020-09-26 PROBLEM — R10.13 EPIGASTRIC ABDOMINAL PAIN: Status: RESOLVED | Noted: 2020-01-01 | Resolved: 2020-01-01

## 2020-10-14 NOTE — TELEPHONE ENCOUNTER
LVM    ----- Message from Dillan Nur MD sent at 10/10/2020  9:57 AM EDT -----  Cool  Please let patient know that I have emailed a prescription for trijardy xr to [facility] and when he starts on it that it will take the place of both glyxambi and metformin  ----- Message -----  From: Kailyn Brennan MA  Sent: 10/8/2020   3:08 PM EDT  To: Dillan Nur MD    Got it figured out. No PA required    ----- Message -----  From: Dillan Nur MD  Sent: 10/1/2020   2:12 PM EDT  To: Kailyn Brennan MA    He has some type of prescription coverage - he doesn't pay much for any of his meds  ----- Message -----  From: Kailyn Brennan MA  Sent: 10/1/2020   1:25 PM EDT  To: Dillan Nur MD    Denied. Apparently he does not have drug coverage.     ----- Message -----  From: Dillan Nur MD  Sent: 9/26/2020   2:25 PM EDT  To: Kailyn Brennan MA    Please try to PA trijardy xr  Would take the place of glyxambi and metformin ER

## 2020-10-27 NOTE — PROGRESS NOTES
History of Present Illness   Aaron Delgado is a 75 y.o. male who presents to the clinic today pertaining to his DM, type 2. In addition, he has  Hypertension, Dyslipidemia, and Atrial Fib.      Diabetes    He has type 2 diabetes mellitus. Pertinent negatives for diabetes include no chest pain, no polydipsia, no polyphagia and no polyuria. Risk factors for coronary artery disease include diabetes mellitus, dyslipidemia, hypertension and sedentary lifestyle. Current diabetes treatment includes oral agent (triple therapy).   Lab Results   Component Value Date    HGBA1C 6.10 (H) 06/26/2020     Lab Results   Component Value Date    HGBA1C 6.00 (H) 09/25/2020     Hypertension   The problem is controlled. Pertinent negatives include no chest pain or shortness of breath. Risk factors for coronary artery disease include dyslipidemia, male gender and sedentary lifestyle. Current antihypertension treatment includes beta blockers, ACEI, and calcium channel blockers.     Lab Results   Component Value Date    CREATININE 1.43 (H) 07/21/2020     Lab Results   Component Value Date    CREATININE 1.53 (H) 09/25/2020      Dyslipidemia    Current antihyperlipidemic treatment includes statins . Risk factors for coronary artery disease include diabetes mellitus, dyslipidemia, hypertension and a sedentary lifestyle.     Lab Results   Component Value Date    CHLPL 77 09/25/2020    TRIG 141 09/25/2020    HDL 24 (L) 09/25/2020    LDL 25 09/25/2020     Atrial Fibrillation  Symptoms include dizziness and hypertension. Symptoms are negative for chest pain, palpitations, shortness of breath, tachycardia and weakness. Past medical history includes atrial fibrillation.  Medications include Amiodarone, Rhymol and Eliquis.      The following portions of the patient's history were reviewed and updated as appropriate: allergies, current medications, past family history, past medical history, past social history, past surgical history and problem  list.    Current Outpatient Medications:   •  amiodarone (PACERONE) 200 MG tablet, Take 1 tablet by mouth Daily., Disp: 90 tablet, Rfl: 3  •  amLODIPine (NORVASC) 10 MG tablet, Take 1 tablet by mouth Every Evening., Disp: 30 tablet, Rfl: 5  •  benazepril (LOTENSIN) 40 MG tablet, Take 1 tablet by mouth Daily., Disp: 90 tablet, Rfl: 3  •  BISACODYL 5 MG EC tablet, TK 4 TS PO FOR1 DOSE PRF CONSTIPATION, Disp: , Rfl:   •  clotrimazole-betamethasone (LOTRISONE) 1-0.05 % cream, Apply  topically to the appropriate area as directed 2 (Two) Times a Day., Disp: 45 g, Rfl: 0  •  doxazosin (CARDURA) 2 MG tablet, 1/2 po nightly for 1 week then 1 po nightly afterward, Disp: 90 tablet, Rfl: 3  •  ELIQUIS 5 MG tablet tablet, TAKE 1 TABLET EVERY 12 HOURS, Disp: 180 tablet, Rfl: 3  •  Empagliflozin-Linaglip-Metform (Trijardy XR) 25-5-1000 MG tablet sustained-release 24 hour, Take 0.5 tablets by mouth Every Morning., Disp: 90 tablet, Rfl: 3  •  fluconazole (Diflucan) 150 MG tablet, Take 1 tablet by mouth Daily As Needed (yeast infection)., Disp: 7 tablet, Rfl: 0  •  fluticasone (CUTIVATE) 0.05 % cream, Apply  topically to the appropriate area as directed 2 (Two) Times a Day., Disp: 30 g, Rfl: 0  •  levothyroxine (SYNTHROID, LEVOTHROID) 100 MCG tablet, Take 1 tablet by mouth Daily., Disp: 30 tablet, Rfl: 5  •  lidocaine (LIDODERM) 5 %, Place 1 patch on the skin as directed by provider Daily. Remove & Discard patch within 12 hours or as directed by MD, Disp: 90 patch, Rfl: 1  •  LORazepam (ATIVAN) 0.5 MG tablet, Take 1 tablet by mouth Every 8 (Eight) Hours As Needed (DIZZINESS)., Disp: 10 tablet, Rfl: 0  •  magnesium citrate 1.745 GM/30ML solution solution,  ML PO FOR 1 DOSE, Disp: , Rfl:   •  metoprolol succinate XL (TOPROL-XL) 50 MG 24 hr tablet, TAKE 1 TABLET EVERY NIGHT, Disp: 90 tablet, Rfl: 3  •  ondansetron (ZOFRAN) 4 MG tablet, Take 1 tablet by mouth Every 8 (Eight) Hours As Needed for Vomiting., Disp: 30 tablet, Rfl: 0  •   propafenone (RYTHMOL) 225 MG tablet, Take 1 tablet by mouth Every 8 (Eight) Hours., Disp: 270 tablet, Rfl: 3  •  rosuvastatin (CRESTOR) 20 MG tablet, TAKE 1 TABLET EVERY NIGHT, Disp: 90 tablet, Rfl: 3  •  sildenafil (VIAGRA) 100 MG tablet, Take 1 tablet by mouth Daily As Needed for Erectile Dysfunction., Disp: 30 tablet, Rfl: 2  •  vitamin B-12 (CYANOCOBALAMIN) 1000 MCG tablet, Take 1 tablet by mouth Daily., Disp: 30 tablet, Rfl: 5  •  Zoster Vac Recomb Adjuvanted (Shingrix) 50 MCG/0.5ML reconstituted suspension, Inject 0.5 mL into the appropriate muscle as directed by prescriber See Admin Instructions. Repeat in 2-6 months, Disp: 1 each, Rfl: 1  •  metFORMIN (GLUCOPHAGE) 500 MG tablet, Take 1 tablet by mouth Daily With Breakfast., Disp: 60 tablet, Rfl: 5    Current Facility-Administered Medications:   •  cyanocobalamin injection 1,000 mcg, 1,000 mcg, Intramuscular, Q28 Days, Dillan Nur MD, 1,000 mcg at 06/06/19 1359    No Known Allergies    Review of Systems   Constitutional: Negative for activity change, appetite change, chills, fatigue, fever and unexpected weight change.   HENT: Negative.    Eyes: Negative for visual disturbance.   Respiratory: Negative for cough, chest tightness, shortness of breath and wheezing.    Cardiovascular: Negative for chest pain, palpitations and leg swelling.   Gastrointestinal: Negative for abdominal pain, constipation, diarrhea, nausea and vomiting.   Endocrine: Negative for cold intolerance, heat intolerance, polydipsia, polyphagia and polyuria.   Musculoskeletal: Positive for arthralgias and back pain.   Skin: Negative for color change and rash.   Neurological: Positive for dizziness. Negative for tremors, speech difficulty, weakness, light-headedness and headaches.   Hematological: Negative for adenopathy.   Psychiatric/Behavioral: Negative for confusion, decreased concentration and suicidal ideas. The patient is not nervous/anxious.    All other systems reviewed  "and are negative.    Visit Vitals  /60 (BP Location: Left arm, Patient Position: Sitting, Cuff Size: Adult)   Pulse 59   Temp 96.9 °F (36.1 °C) (Temporal)   Ht 175.3 cm (69.02\")   Wt 84.4 kg (186 lb)   SpO2 96%   BMI 27.45 kg/m²     Physical Exam  Vitals signs reviewed.   Constitutional:       General: He is not in acute distress.     Appearance: He is well-developed.      Comments: Pleasant;; in good spirits. Wearing appropriate face covering   HENT:      Head: Normocephalic.      Nose: Nose normal.   Eyes:      General: No scleral icterus.     Conjunctiva/sclera: Conjunctivae normal.      Pupils: Pupils are equal, round, and reactive to light.   Neck:      Musculoskeletal: Neck supple.      Thyroid: No thyromegaly.      Vascular: No JVD.   Cardiovascular:      Rate and Rhythm: Normal rate and regular rhythm.      Heart sounds: Normal heart sounds. No murmur.   Pulmonary:      Effort: Pulmonary effort is normal. No respiratory distress.      Breath sounds: Normal breath sounds. No wheezing or rales.   Abdominal:      General: Bowel sounds are normal.      Palpations: Abdomen is soft.      Tenderness: There is no abdominal tenderness. There is no guarding.   Musculoskeletal:      Comments: Bilateral compression stockings   Lymphadenopathy:      Cervical: No cervical adenopathy.   Skin:     General: Skin is warm and dry.      Capillary Refill: Capillary refill takes less than 2 seconds.      Findings: No erythema or rash.   Neurological:      Mental Status: He is alert and oriented to person, place, and time.   Psychiatric:         Mood and Affect: Mood normal.         Speech: Speech normal.         Behavior: Behavior is cooperative.         Thought Content: Thought content normal.         Judgment: Judgment normal.       Assessment/Plan   Diagnoses and all orders for this visit:    1. Type 2 diabetes mellitus with peripheral neuropathy (CMS/HCC) (Primary)  Comments:  Discussed options with Aaron and Dr" Sebastián. Aaron will take 1/2 tablet daily in the morning  and add one Metformin 500 mg daily with dinner. Monitor his bs  Orders:  -     Empagliflozin-Linaglip-Metform (Trijardy XR) 25-5-1000 MG tablet sustained-release 24 hour; Take 0.5 tablets by mouth Every Morning.  Dispense: 90 tablet; Refill: 3  -     metFORMIN (GLUCOPHAGE) 500 MG tablet; Take 1 tablet by mouth Daily With Breakfast.  Dispense: 60 tablet; Refill: 5    2. Essential hypertension  Comments:  Well controlled    3. Paroxysmal atrial fibrillation (CMS/HCC)  Comments:  Stable    4. Mixed hyperlipidemia  Comments:  Continue cardiovascular risk factor reductions and Crestor.    Findings and recommendations discussed with Aaron. He reports he was unable to tolerate the Empagliflozin 25 mg previous due to Myocotic infections. Discussed options with Aaron and Dr Lowery. Aaron will take 1/2 tablet daily in the morning  and add one Metformin 500 mg daily with dinner. Monitor his blood sugars and report to me if his glycemic control worsens. Blood pressure is well controlled.  Continue cardiovascular risk factor reductions and Crestor.Atrial Fib is well controlled. He will f/u with Dr Nur in January; sooner if problems/concerns occur.     This document has been electronically signed by AMARILIS Stewart, EDEN-BC, BRAULIO  October 27, 2020 15:29 EDT

## 2020-11-17 NOTE — PROGRESS NOTES
: 1945    Chief Complaint   Patient presents with   • Elevated Hepatic Enzymes       Aaron Delgado is a 75 y.o. male who presents to the office today as a follow up appointment regarding Elevated Hepatic Enzymes.    History of Present Illness:  Has not had any labs since 2020. Planning to have labs ordered by PCP soon to re-check liver enzymes and TSH. He has had a change in his thyroid medications. He has not had any changes in his health since last visit.     Previous history:  His liver enzymes have been elevated for several months now, mostly has elevated alkaline phosphatase. He completed liver workup with labs at previous GI visits. He had no history of cirrhosis but admits past history of fatty liver disease. Last imaging showed likely early cirrhosis with nodularity of the liver. He does not drink alcohol. He denies any prior alcoholism. He states he was diagnosed with NAFLD in 2019 by Dr. Fine (GI) and does have a history of diabetes and elevated cholesterol. He does not have his gallbladder.  There is no known family history of liver disease. 2020, he had colonoscopy with Dr. Fine in [place], had 2 adenomatous colon polyps removed at that time. He denies any heartburn, does not take any PPIs.     Review of Systems   Constitutional: Negative.    HENT: Negative for sore throat and trouble swallowing.    Eyes: Negative.    Respiratory: Negative for chest tightness.    Cardiovascular: Negative for chest pain.   Gastrointestinal: Negative for abdominal distention, abdominal pain, anal bleeding, blood in stool, constipation, diarrhea, nausea, rectal pain and vomiting.   Endocrine: Negative.    Genitourinary: Negative.    Musculoskeletal: Negative for back pain and neck pain.   Skin: Negative.  Negative for rash.   Allergic/Immunologic: Negative for environmental allergies and food allergies.   Neurological: Positive for dizziness. Negative for headaches.   Hematological: Bruises/bleeds  easily.   Psychiatric/Behavioral: Negative for agitation and sleep disturbance. The patient is not nervous/anxious.      I have reviewed and confirmed the accuracy of the ROS as documented by the MA/LPN/RN Selina Alston PA-C    Past Medical History:   Diagnosis Date   • Arthritis    • CHF (congestive heart failure) (CMS/HCC)    • Coronary artery disease    • Diabetes mellitus (CMS/HCC)    • GERD (gastroesophageal reflux disease)    • Hypothyroidism due to Hashimoto's thyroiditis 9/25/2020   • Non-alcoholic cirrhosis (CMS/HCC)        Past Surgical History:   Procedure Laterality Date   • CHOLECYSTECTOMY     • COLONOSCOPY  01/2020    Dr. Fine- tubular adenomatous colon polyps x2   • CORONARY ARTERY BYPASS GRAFT     • ENDOSCOPY     • ENDOSCOPY N/A 8/17/2020    Procedure: ESOPHAGOGASTRODUODENOSCOPY WITH BIOPSY CPT CODE: 69311;  Surgeon: Andrew Barnett MD;  Location: [facility];  Service: Gastroenterology;  Laterality: N/A;       History reviewed. No pertinent family history.    Social History     Socioeconomic History   • Marital status:      Spouse name: Not on file   • Number of children: Not on file   • Years of education: Not on file   • Highest education level: Not on file   Occupational History   • Occupation: Retired   Social Needs   • Financial resource strain: Patient refused   • Food insecurity     Worry: Patient refused     Inability: Patient refused   • Transportation needs     Medical: Patient refused     Non-medical: Patient refused   Tobacco Use   • Smoking status: Never Smoker   • Smokeless tobacco: Never Used   Substance and Sexual Activity   • Alcohol use: No     Frequency: Never   • Drug use: No   • Sexual activity: Defer   Lifestyle   • Physical activity     Days per week: Patient refused     Minutes per session: Patient refused   • Stress: Patient refused   Relationships   • Social connections     Talks on phone: Patient refused     Gets together: Patient refused     Attends  Faith service: Patient refused     Active member of club or organization: Patient refused     Attends meetings of clubs or organizations: Patient refused     Relationship status: Patient refused       Current Outpatient Medications:   •  amiodarone (PACERONE) 200 MG tablet, Take 1 tablet by mouth Daily., Disp: 90 tablet, Rfl: 3  •  amLODIPine (NORVASC) 10 MG tablet, Take 1 tablet by mouth Every Evening., Disp: 30 tablet, Rfl: 5  •  benazepril (LOTENSIN) 40 MG tablet, Take 1 tablet by mouth Daily., Disp: 90 tablet, Rfl: 3  •  BISACODYL 5 MG EC tablet, TK 4 TS PO FOR1 DOSE PRF CONSTIPATION, Disp: , Rfl:   •  clotrimazole-betamethasone (LOTRISONE) 1-0.05 % cream, Apply  topically to the appropriate area as directed 2 (Two) Times a Day., Disp: 45 g, Rfl: 0  •  doxazosin (CARDURA) 2 MG tablet, 1/2 po nightly for 1 week then 1 po nightly afterward, Disp: 90 tablet, Rfl: 3  •  ELIQUIS 5 MG tablet tablet, TAKE 1 TABLET EVERY 12 HOURS, Disp: 180 tablet, Rfl: 3  •  Empagliflozin-Linaglip-Metform (Trijardy XR) 25-5-1000 MG tablet sustained-release 24 hour, Take 0.5 tablets by mouth Every Morning., Disp: 90 tablet, Rfl: 3  •  fluconazole (Diflucan) 150 MG tablet, Take 1 tablet by mouth Daily As Needed (yeast infection)., Disp: 7 tablet, Rfl: 0  •  fluticasone (CUTIVATE) 0.05 % cream, Apply  topically to the appropriate area as directed 2 (Two) Times a Day., Disp: 30 g, Rfl: 0  •  levothyroxine (SYNTHROID, LEVOTHROID) 100 MCG tablet, Take 1 tablet by mouth Daily., Disp: 30 tablet, Rfl: 5  •  lidocaine (LIDODERM) 5 %, Place 1 patch on the skin as directed by provider Daily. Remove & Discard patch within 12 hours or as directed by MD, Disp: 90 patch, Rfl: 1  •  LORazepam (ATIVAN) 0.5 MG tablet, Take 1 tablet by mouth Every 8 (Eight) Hours As Needed (DIZZINESS)., Disp: 10 tablet, Rfl: 0  •  magnesium citrate 1.745 GM/30ML solution solution,  ML PO FOR 1 DOSE, Disp: , Rfl:   •  metFORMIN (GLUCOPHAGE) 500 MG tablet, Take 1  "tablet by mouth Daily With Breakfast., Disp: 60 tablet, Rfl: 5  •  metoprolol succinate XL (TOPROL-XL) 50 MG 24 hr tablet, TAKE 1 TABLET EVERY NIGHT, Disp: 90 tablet, Rfl: 3  •  ondansetron (ZOFRAN) 4 MG tablet, Take 1 tablet by mouth Every 8 (Eight) Hours As Needed for Vomiting., Disp: 30 tablet, Rfl: 0  •  propafenone (RYTHMOL) 225 MG tablet, Take 1 tablet by mouth Every 8 (Eight) Hours., Disp: 270 tablet, Rfl: 3  •  rosuvastatin (CRESTOR) 20 MG tablet, TAKE 1 TABLET EVERY NIGHT, Disp: 90 tablet, Rfl: 3  •  sildenafil (VIAGRA) 100 MG tablet, Take 1 tablet by mouth Daily As Needed for Erectile Dysfunction., Disp: 30 tablet, Rfl: 2  •  vitamin B-12 (CYANOCOBALAMIN) 1000 MCG tablet, Take 1 tablet by mouth Daily., Disp: 30 tablet, Rfl: 5  •  Zoster Vac Recomb Adjuvanted (Shingrix) 50 MCG/0.5ML reconstituted suspension, Inject 0.5 mL into the appropriate muscle as directed by prescriber See Admin Instructions. Repeat in 2-6 months, Disp: 1 each, Rfl: 1    Current Facility-Administered Medications:   •  cyanocobalamin injection 1,000 mcg, 1,000 mcg, Intramuscular, Q28 Days, Dillan Nur MD, 1,000 mcg at 06/06/19 1359    Allergies:   Patient has no known allergies.    Vitals:  /70 (BP Location: Left arm, Patient Position: Sitting, Cuff Size: Adult)   Pulse 56   Temp 97.3 °F (36.3 °C)   Ht 175.3 cm (69\")   Wt 84.8 kg (187 lb)   SpO2 95%   BMI 27.62 kg/m²     Physical Exam  Vitals signs reviewed.   Constitutional:       General: He is not in acute distress.     Appearance: Normal appearance. He is well-developed. He is not ill-appearing or diaphoretic.   HENT:      Head: Normocephalic and atraumatic.      Right Ear: External ear normal.      Left Ear: External ear normal.      Nose: Nose normal.      Mouth/Throat:      Comments: Wearing a mask  Eyes:      General: No scleral icterus.        Right eye: No discharge.         Left eye: No discharge.      Conjunctiva/sclera: Conjunctivae normal.   Neck: "      Musculoskeletal: Normal range of motion.      Vascular: No JVD.   Cardiovascular:      Rate and Rhythm: Normal rate and regular rhythm.      Heart sounds: Normal heart sounds. No murmur. No friction rub. No gallop.    Pulmonary:      Effort: Pulmonary effort is normal. No respiratory distress.      Breath sounds: Normal breath sounds. No wheezing or rales.   Chest:      Chest wall: No tenderness.   Abdominal:      General: Bowel sounds are normal. There is distension (bloated).      Palpations: Abdomen is soft. There is no mass.      Tenderness: There is no abdominal tenderness. There is no guarding.   Musculoskeletal: Normal range of motion.         General: No deformity.   Skin:     General: Skin is warm and dry.      Findings: No erythema or rash.   Neurological:      Mental Status: He is alert and oriented to person, place, and time.      Coordination: Coordination normal.   Psychiatric:         Mood and Affect: Mood normal.         Behavior: Behavior normal.         Thought Content: Thought content normal.         Judgment: Judgment normal.     Results Review:  EGD completed on 8/17/2020 by Dr. Martels.  Findings were normal esophagus, normal stomach, normal duodenum.  Labs showed mild chronic gastritis without H. pylori.     CT scan abd/pelv with contrast 7/13/2020:  1. There is trace free fluid in the right lower quadrant, but no  evidence of appendicitis otherwise.  2. Gastric wall is slightly thickened.  3. Sigmoid diverticuli.  4. Prostate enlargement.  Liver normal      Labs 7/16/2020: 4 1 antitrypsin normal, ceruloplasmin 31.7, IgG IgA and IgM normal, tissue transglutaminase normal, AGUILERA fibro-sure fibrosis stage F4, inflammation S3 which is compatible with marked or severe steatosis, Borderline or probable AGUILERA, GGT elevated 841, , , cholesterol total 96, glucose 170, triglycerides 129, alkaline phosphatase 467.  GFR 59.     Labs 2020: Antimitochondrial antibody normal,  anti-smooth muscle antibody normal, TSH elevated, CBC with normal hematocrit and hemoglobin, normal platelets, iron panel previously normal.  Labs in 2019 showed acute hepatitis panel negative for hepatitis A, B, and C, GGT previously elevated at 173.  It is noted that TSH has been elevated all the way back through the entire year of 2019.      Labs 6/26/2020: GFR 55, alk phos 422, , ALT 80, total bilirubin 0.5, creatinine 1.28, total cholesterol 90,   triglycerides 141, LDL cholesterol 33, TSH elevated 19.800, hemoglobin A1c 6.10.     AP, AST and ALT trend:  Results for KRIS STEELE (MRN [id number]) as of 7/1/2020 14:01    Ref. Range 6/6/2019 14:01 6/28/2019 14:04 7/5/2019 09:25 8/1/2019 11:00 9/20/2019 00:00 11/27/2019 16:23 12/10/2019 19:24 12/13/2019 16:00 6/26/2020 10:42   ALT (SGPT) Latest Ref Range: 1 - 41 U/L 73 (H) 56 (H) 37 58 (H) 45 (H) 68 (H) 70 (H) 62 (H) 80 (H)   AST (SGOT) Latest Ref Range: 1 - 40 U/L 71 (H) 53 (H) 40 65 (H) 63 (H) 120 (H) 106 (H) 88 (H) 144 (H)   Alkaline Phosphatase Latest Ref Range: 39 - 117 U/L 203 (H) 149 (H) 115 129 (H) 121 (H) 192 (H) 205 (H) 199 (H) 422 (H)        Assessment:  1. Abnormal AST and ALT    2. Elevated alkaline phosphatase level    3. NAFLD (nonalcoholic fatty liver disease)    4. Hypothyroidism due to Hashimoto's thyroiditis      Plan:  He will have labs today as ordered by PCP for re-evaluation of thyroid and liver enzymes. If still elevated or worsened, he will consider liver biopsy for further evaluation. Does have nodularity of liver on previous imaging indicating early/mild cirrhosis in setting of fatty liver disease. Will work on achieving healthy BMI and better control of glucose and cholesterol. Avoid alcohol.       Follow up will be determined after review of lab results.      Electronically signed 11/17/2020 14:44 NANCY PaezC  [facility]

## 2020-11-18 NOTE — TELEPHONE ENCOUNTER
Reviewed pt's labs, TSH is still extremely abnormal. His PCP is adjusting his medication. Liver enzymes still very elevated but minimally improved from last. Will wait on liver biopsy for now and re-ck labs in approx 6 weeks. Please let him know.

## 2020-11-19 NOTE — PROGRESS NOTES
[facility]?     -- Needs to increase his levothyroxine from 100 mcg to 137 mcg daily   I have emailed a prescription for the new dose to [facility]

## 2021-01-01 ENCOUNTER — TELEPHONE (OUTPATIENT)
Dept: FAMILY MEDICINE CLINIC | Facility: CLINIC | Age: 76
End: 2021-01-01

## 2021-01-01 ENCOUNTER — APPOINTMENT (OUTPATIENT)
Dept: ULTRASOUND IMAGING | Facility: HOSPITAL | Age: 76
End: 2021-01-01

## 2021-01-01 ENCOUNTER — OFFICE VISIT (OUTPATIENT)
Dept: FAMILY MEDICINE CLINIC | Facility: CLINIC | Age: 76
End: 2021-01-01

## 2021-01-01 ENCOUNTER — OFFICE VISIT (OUTPATIENT)
Dept: CARDIOLOGY | Facility: CLINIC | Age: 76
End: 2021-01-01

## 2021-01-01 ENCOUNTER — HOSPITAL ENCOUNTER (OUTPATIENT)
Dept: ULTRASOUND IMAGING | Facility: HOSPITAL | Age: 76
Discharge: HOME | End: 2021-03-01

## 2021-01-01 ENCOUNTER — TELEPHONE (OUTPATIENT)
Dept: UROLOGY | Facility: CLINIC | Age: 76
End: 2021-01-01

## 2021-01-01 ENCOUNTER — OFFICE VISIT (OUTPATIENT)
Dept: GASTROENTEROLOGY | Facility: CLINIC | Age: 76
End: 2021-01-01

## 2021-01-01 ENCOUNTER — HOSPITAL ENCOUNTER (OUTPATIENT)
Dept: ULTRASOUND IMAGING | Facility: HOSPITAL | Age: 76
Discharge: HOME | End: 2021-02-05
Admitting: GENERAL PRACTICE

## 2021-01-01 ENCOUNTER — OFFICE VISIT (OUTPATIENT)
Dept: UROLOGY | Facility: CLINIC | Age: 76
End: 2021-01-01

## 2021-01-01 ENCOUNTER — IMMUNIZATION (OUTPATIENT)
Dept: VACCINE CLINIC | Facility: HOSPITAL | Age: 76
End: 2021-01-01

## 2021-01-01 ENCOUNTER — HOSPITAL ENCOUNTER (INPATIENT)
Facility: HOSPITAL | Age: 76
LOS: 2 days | Discharge: ANOTHER HOSPITAL | End: 2021-03-13
Attending: EMERGENCY MEDICINE | Admitting: INTERNAL MEDICINE

## 2021-01-01 ENCOUNTER — APPOINTMENT (OUTPATIENT)
Dept: CARDIOLOGY | Facility: HOSPITAL | Age: 76
End: 2021-01-01

## 2021-01-01 ENCOUNTER — PREP FOR SURGERY (OUTPATIENT)
Dept: OTHER | Facility: HOSPITAL | Age: 76
End: 2021-01-01

## 2021-01-01 ENCOUNTER — HOSPITAL ENCOUNTER (OUTPATIENT)
Dept: INFUSION THERAPY | Facility: HOSPITAL | Age: 76
Discharge: HOME | End: 2021-03-01

## 2021-01-01 ENCOUNTER — TRANSCRIBE ORDERS (OUTPATIENT)
Dept: ADMINISTRATIVE | Facility: HOSPITAL | Age: 76
End: 2021-01-01

## 2021-01-01 ENCOUNTER — APPOINTMENT (OUTPATIENT)
Dept: CT IMAGING | Facility: HOSPITAL | Age: 76
End: 2021-01-01

## 2021-01-01 ENCOUNTER — LAB (OUTPATIENT)
Dept: FAMILY MEDICINE CLINIC | Facility: CLINIC | Age: 76
End: 2021-01-01

## 2021-01-01 ENCOUNTER — APPOINTMENT (OUTPATIENT)
Dept: GENERAL RADIOLOGY | Facility: HOSPITAL | Age: 76
End: 2021-01-01

## 2021-01-01 ENCOUNTER — PROCEDURE VISIT (OUTPATIENT)
Dept: UROLOGY | Facility: CLINIC | Age: 76
End: 2021-01-01

## 2021-01-01 ENCOUNTER — TRANSCRIBE ORDERS (OUTPATIENT)
Dept: INFUSION THERAPY | Facility: HOSPITAL | Age: 76
End: 2021-01-01

## 2021-01-01 ENCOUNTER — HOSPITAL ENCOUNTER (INPATIENT)
Facility: HOSPITAL | Age: 76
LOS: 5 days | Discharge: HOSPICE | End: 2021-03-18
Attending: INTERNAL MEDICINE | Admitting: HOSPITALIST

## 2021-01-01 VITALS
OXYGEN SATURATION: 93 % | RESPIRATION RATE: 14 BRPM | BODY MASS INDEX: 27.7 KG/M2 | HEART RATE: 69 BPM | HEIGHT: 69 IN | SYSTOLIC BLOOD PRESSURE: 118 MMHG | TEMPERATURE: 96.6 F | DIASTOLIC BLOOD PRESSURE: 64 MMHG | WEIGHT: 187 LBS

## 2021-01-01 VITALS — BODY MASS INDEX: 28.14 KG/M2 | TEMPERATURE: 96.8 F | HEIGHT: 69 IN | WEIGHT: 190 LBS

## 2021-01-01 VITALS
HEART RATE: 95 BPM | HEIGHT: 69 IN | OXYGEN SATURATION: 91 % | BODY MASS INDEX: 29.02 KG/M2 | WEIGHT: 195.9 LBS | RESPIRATION RATE: 16 BRPM | DIASTOLIC BLOOD PRESSURE: 51 MMHG | TEMPERATURE: 99.4 F | SYSTOLIC BLOOD PRESSURE: 82 MMHG

## 2021-01-01 VITALS
BODY MASS INDEX: 28.18 KG/M2 | SYSTOLIC BLOOD PRESSURE: 91 MMHG | HEART RATE: 58 BPM | RESPIRATION RATE: 16 BRPM | DIASTOLIC BLOOD PRESSURE: 50 MMHG | TEMPERATURE: 97.3 F | WEIGHT: 190.8 LBS

## 2021-01-01 VITALS
HEART RATE: 64 BPM | BODY MASS INDEX: 27.7 KG/M2 | OXYGEN SATURATION: 96 % | DIASTOLIC BLOOD PRESSURE: 70 MMHG | SYSTOLIC BLOOD PRESSURE: 110 MMHG | HEIGHT: 69 IN | WEIGHT: 187 LBS | TEMPERATURE: 97 F

## 2021-01-01 VITALS
HEART RATE: 74 BPM | SYSTOLIC BLOOD PRESSURE: 116 MMHG | HEIGHT: 69 IN | WEIGHT: 183 LBS | BODY MASS INDEX: 27.11 KG/M2 | DIASTOLIC BLOOD PRESSURE: 70 MMHG | TEMPERATURE: 97.1 F | OXYGEN SATURATION: 95 %

## 2021-01-01 VITALS — BODY MASS INDEX: 27.85 KG/M2 | WEIGHT: 188 LBS | TEMPERATURE: 94.6 F | HEIGHT: 69 IN

## 2021-01-01 VITALS
HEIGHT: 69 IN | SYSTOLIC BLOOD PRESSURE: 99 MMHG | TEMPERATURE: 96.8 F | WEIGHT: 177 LBS | HEART RATE: 62 BPM | OXYGEN SATURATION: 94 % | BODY MASS INDEX: 26.22 KG/M2 | DIASTOLIC BLOOD PRESSURE: 58 MMHG

## 2021-01-01 VITALS
SYSTOLIC BLOOD PRESSURE: 118 MMHG | HEART RATE: 62 BPM | OXYGEN SATURATION: 97 % | DIASTOLIC BLOOD PRESSURE: 63 MMHG | RESPIRATION RATE: 16 BRPM

## 2021-01-01 VITALS
TEMPERATURE: 96.8 F | OXYGEN SATURATION: 95 % | HEART RATE: 94 BPM | DIASTOLIC BLOOD PRESSURE: 60 MMHG | BODY MASS INDEX: 27.4 KG/M2 | WEIGHT: 185 LBS | HEIGHT: 69 IN | SYSTOLIC BLOOD PRESSURE: 110 MMHG

## 2021-01-01 VITALS
HEIGHT: 69 IN | HEART RATE: 61 BPM | OXYGEN SATURATION: 96 % | WEIGHT: 184 LBS | BODY MASS INDEX: 27.25 KG/M2 | SYSTOLIC BLOOD PRESSURE: 108 MMHG | DIASTOLIC BLOOD PRESSURE: 65 MMHG

## 2021-01-01 VITALS
DIASTOLIC BLOOD PRESSURE: 64 MMHG | SYSTOLIC BLOOD PRESSURE: 124 MMHG | OXYGEN SATURATION: 94 % | RESPIRATION RATE: 18 BRPM | WEIGHT: 182.3 LBS | BODY MASS INDEX: 27 KG/M2 | HEIGHT: 69 IN | TEMPERATURE: 97.5 F | HEART RATE: 70 BPM

## 2021-01-01 VITALS — HEIGHT: 69 IN | BODY MASS INDEX: 26.14 KG/M2

## 2021-01-01 DIAGNOSIS — N40.1 BPH WITH OBSTRUCTION/LOWER URINARY TRACT SYMPTOMS: ICD-10-CM

## 2021-01-01 DIAGNOSIS — K76.0 NAFLD (NONALCOHOLIC FATTY LIVER DISEASE): ICD-10-CM

## 2021-01-01 DIAGNOSIS — N13.8 BPH WITH OBSTRUCTION/LOWER URINARY TRACT SYMPTOMS: Primary | ICD-10-CM

## 2021-01-01 DIAGNOSIS — R53.1 WEAKNESS: ICD-10-CM

## 2021-01-01 DIAGNOSIS — E87.20 METABOLIC ACIDOSIS: ICD-10-CM

## 2021-01-01 DIAGNOSIS — I10 ESSENTIAL HYPERTENSION: Primary | ICD-10-CM

## 2021-01-01 DIAGNOSIS — N18.9 ACUTE RENAL FAILURE SUPERIMPOSED ON CHRONIC KIDNEY DISEASE, UNSPECIFIED CKD STAGE, UNSPECIFIED ACUTE RENAL FAILURE TYPE (HCC): ICD-10-CM

## 2021-01-01 DIAGNOSIS — G89.29 CHRONIC RIGHT SHOULDER PAIN: ICD-10-CM

## 2021-01-01 DIAGNOSIS — E87.1 HYPONATREMIA: ICD-10-CM

## 2021-01-01 DIAGNOSIS — R79.9 ABNORMAL BLOOD CHEMISTRY: ICD-10-CM

## 2021-01-01 DIAGNOSIS — E11.42 TYPE 2 DIABETES MELLITUS WITH PERIPHERAL NEUROPATHY (HCC): ICD-10-CM

## 2021-01-01 DIAGNOSIS — K76.7 HEPATORENAL SYNDROME (HCC): Primary | ICD-10-CM

## 2021-01-01 DIAGNOSIS — Z87.442 HISTORY OF NEPHROLITHIASIS: ICD-10-CM

## 2021-01-01 DIAGNOSIS — E78.2 MIXED HYPERLIPIDEMIA: ICD-10-CM

## 2021-01-01 DIAGNOSIS — E53.8 B12 DEFICIENCY: ICD-10-CM

## 2021-01-01 DIAGNOSIS — K21.9 GASTROESOPHAGEAL REFLUX DISEASE WITHOUT ESOPHAGITIS: ICD-10-CM

## 2021-01-01 DIAGNOSIS — E06.3 HYPOTHYROIDISM DUE TO HASHIMOTO'S THYROIDITIS: ICD-10-CM

## 2021-01-01 DIAGNOSIS — N13.8 BPH WITH OBSTRUCTION/LOWER URINARY TRACT SYMPTOMS: ICD-10-CM

## 2021-01-01 DIAGNOSIS — I48.0 PAROXYSMAL ATRIAL FIBRILLATION (HCC): Primary | ICD-10-CM

## 2021-01-01 DIAGNOSIS — R18.8 ASCITES: Primary | ICD-10-CM

## 2021-01-01 DIAGNOSIS — R33.9 INCOMPLETE BLADDER EMPTYING: ICD-10-CM

## 2021-01-01 DIAGNOSIS — H02.403 PTOSIS OF BOTH EYELIDS: ICD-10-CM

## 2021-01-01 DIAGNOSIS — Z23 ENCOUNTER FOR IMMUNIZATION: ICD-10-CM

## 2021-01-01 DIAGNOSIS — R33.8 ACUTE URINARY RETENTION: ICD-10-CM

## 2021-01-01 DIAGNOSIS — K74.60 CIRRHOSIS OF LIVER WITH ASCITES, UNSPECIFIED HEPATIC CIRRHOSIS TYPE (HCC): ICD-10-CM

## 2021-01-01 DIAGNOSIS — N40.1 BPH WITH OBSTRUCTION/LOWER URINARY TRACT SYMPTOMS: Primary | ICD-10-CM

## 2021-01-01 DIAGNOSIS — N52.01 ERECTILE DYSFUNCTION DUE TO ARTERIAL INSUFFICIENCY: ICD-10-CM

## 2021-01-01 DIAGNOSIS — E78.2 MIXED HYPERLIPIDEMIA: Chronic | ICD-10-CM

## 2021-01-01 DIAGNOSIS — M51.36 DDD (DEGENERATIVE DISC DISEASE), LUMBAR: ICD-10-CM

## 2021-01-01 DIAGNOSIS — R42 VERTIGO: ICD-10-CM

## 2021-01-01 DIAGNOSIS — R30.0 DYSURIA: ICD-10-CM

## 2021-01-01 DIAGNOSIS — R18.8 OTHER ASCITES: ICD-10-CM

## 2021-01-01 DIAGNOSIS — Z00.00 HEALTHCARE MAINTENANCE: ICD-10-CM

## 2021-01-01 DIAGNOSIS — R79.89 ELEVATED TSH: ICD-10-CM

## 2021-01-01 DIAGNOSIS — M54.50 ACUTE MIDLINE LOW BACK PAIN WITHOUT SCIATICA: ICD-10-CM

## 2021-01-01 DIAGNOSIS — R53.83 FATIGUE, UNSPECIFIED TYPE: ICD-10-CM

## 2021-01-01 DIAGNOSIS — I25.10 CORONARY ARTERY DISEASE INVOLVING NATIVE CORONARY ARTERY OF NATIVE HEART WITHOUT ANGINA PECTORIS: ICD-10-CM

## 2021-01-01 DIAGNOSIS — R18.8 CIRRHOSIS OF LIVER WITH ASCITES, UNSPECIFIED HEPATIC CIRRHOSIS TYPE (HCC): ICD-10-CM

## 2021-01-01 DIAGNOSIS — L40.9 PSORIASIS: ICD-10-CM

## 2021-01-01 DIAGNOSIS — K75.81 LIVER CIRRHOSIS SECONDARY TO NASH (HCC): ICD-10-CM

## 2021-01-01 DIAGNOSIS — B37.49 GENITAL CANDIDIASIS IN MALE: Primary | ICD-10-CM

## 2021-01-01 DIAGNOSIS — E78.5 DYSLIPIDEMIA: ICD-10-CM

## 2021-01-01 DIAGNOSIS — R26.81 GAIT INSTABILITY: ICD-10-CM

## 2021-01-01 DIAGNOSIS — E03.8 HYPOTHYROIDISM DUE TO HASHIMOTO'S THYROIDITIS: ICD-10-CM

## 2021-01-01 DIAGNOSIS — N50.89 SWELLING OF MALE GENITAL STRUCTURE: Primary | ICD-10-CM

## 2021-01-01 DIAGNOSIS — I10 ESSENTIAL HYPERTENSION: ICD-10-CM

## 2021-01-01 DIAGNOSIS — R42 DIZZINESS: ICD-10-CM

## 2021-01-01 DIAGNOSIS — M25.511 CHRONIC RIGHT SHOULDER PAIN: ICD-10-CM

## 2021-01-01 DIAGNOSIS — B37.9 CANDIDIASIS: ICD-10-CM

## 2021-01-01 DIAGNOSIS — Z86.010 HISTORY OF ADENOMATOUS POLYP OF COLON: ICD-10-CM

## 2021-01-01 DIAGNOSIS — B37.9 CANDIDIASIS: Primary | ICD-10-CM

## 2021-01-01 DIAGNOSIS — N50.89 SWELLING OF MALE GENITAL STRUCTURE: ICD-10-CM

## 2021-01-01 DIAGNOSIS — N17.9 ACUTE RENAL FAILURE, UNSPECIFIED ACUTE RENAL FAILURE TYPE (HCC): Primary | ICD-10-CM

## 2021-01-01 DIAGNOSIS — I25.810 CORONARY ARTERY DISEASE INVOLVING CORONARY BYPASS GRAFT OF NATIVE HEART WITHOUT ANGINA PECTORIS: ICD-10-CM

## 2021-01-01 DIAGNOSIS — K74.60 LIVER CIRRHOSIS SECONDARY TO NASH (HCC): ICD-10-CM

## 2021-01-01 DIAGNOSIS — R35.0 FREQUENCY OF MICTURITION: Primary | ICD-10-CM

## 2021-01-01 DIAGNOSIS — R29.6 FREQUENT FALLS: ICD-10-CM

## 2021-01-01 DIAGNOSIS — K74.69 OTHER CIRRHOSIS OF LIVER (HCC): Primary | ICD-10-CM

## 2021-01-01 DIAGNOSIS — N48.89 PENILE EDEMA: ICD-10-CM

## 2021-01-01 DIAGNOSIS — R26.81 UNSTEADY GAIT WHEN WALKING: ICD-10-CM

## 2021-01-01 DIAGNOSIS — R74.8 ELEVATED LIVER ENZYMES: ICD-10-CM

## 2021-01-01 DIAGNOSIS — R18.8 ASCITES: ICD-10-CM

## 2021-01-01 DIAGNOSIS — I95.9 HYPOTENSION, UNSPECIFIED HYPOTENSION TYPE: ICD-10-CM

## 2021-01-01 DIAGNOSIS — E55.9 VITAMIN D DEFICIENCY: ICD-10-CM

## 2021-01-01 DIAGNOSIS — R79.89 HIGH SERUM CREATININE: Primary | ICD-10-CM

## 2021-01-01 DIAGNOSIS — R94.31 PROLONGED Q-T INTERVAL ON ECG: Primary | ICD-10-CM

## 2021-01-01 DIAGNOSIS — E11.42 TYPE 2 DIABETES MELLITUS WITH PERIPHERAL NEUROPATHY (HCC): Chronic | ICD-10-CM

## 2021-01-01 DIAGNOSIS — I48.0 PAROXYSMAL ATRIAL FIBRILLATION (HCC): ICD-10-CM

## 2021-01-01 DIAGNOSIS — N17.9 ACUTE RENAL FAILURE SUPERIMPOSED ON CHRONIC KIDNEY DISEASE, UNSPECIFIED CKD STAGE, UNSPECIFIED ACUTE RENAL FAILURE TYPE (HCC): ICD-10-CM

## 2021-01-01 DIAGNOSIS — I10 ESSENTIAL HYPERTENSION: Chronic | ICD-10-CM

## 2021-01-01 DIAGNOSIS — R18.8 OTHER ASCITES: Primary | ICD-10-CM

## 2021-01-01 LAB
25(OH)D3+25(OH)D2 SERPL-MCNC: 29.3 NG/ML (ref 30–100)
6-ACETYL MORPHINE: NEGATIVE
A-A DO2: 48.8 MMHG (ref 0–300)
AFP-TM SERPL-MCNC: 4 NG/ML (ref 0–8.3)
ALBUMIN SERPL-MCNC: 2.02 G/DL (ref 3.5–5.2)
ALBUMIN SERPL-MCNC: 2.2 G/DL (ref 3.5–5.2)
ALBUMIN SERPL-MCNC: 2.4 G/DL (ref 3.5–5.2)
ALBUMIN SERPL-MCNC: 2.5 G/DL (ref 3.5–5.2)
ALBUMIN SERPL-MCNC: 2.5 G/DL (ref 3.7–4.7)
ALBUMIN SERPL-MCNC: 2.8 G/DL (ref 3.5–5.2)
ALBUMIN SERPL-MCNC: 3 G/DL (ref 3.5–5.2)
ALBUMIN/GLOB SERPL: 0.6 G/DL
ALBUMIN/GLOB SERPL: 0.6 {RATIO} (ref 1.2–2.2)
ALBUMIN/GLOB SERPL: 0.7 G/DL
ALBUMIN/GLOB SERPL: 0.7 G/DL
ALBUMIN/GLOB SERPL: 0.9 G/DL
ALBUMIN/GLOB SERPL: 1.2 G/DL
ALBUMIN/GLOB SERPL: 1.3 G/DL
ALP SERPL-CCNC: 305 U/L (ref 39–117)
ALP SERPL-CCNC: 309 U/L (ref 39–117)
ALP SERPL-CCNC: 355 U/L (ref 39–117)
ALP SERPL-CCNC: 388 U/L (ref 39–117)
ALP SERPL-CCNC: 414 U/L (ref 39–117)
ALP SERPL-CCNC: 485 IU/L (ref 39–117)
ALP SERPL-CCNC: 516 U/L (ref 39–117)
ALPHA-FETOPROTEIN: 3.69 NG/ML (ref 0–8.3)
ALT SERPL W P-5'-P-CCNC: 14 U/L (ref 1–41)
ALT SERPL W P-5'-P-CCNC: 14 U/L (ref 1–41)
ALT SERPL W P-5'-P-CCNC: 24 U/L (ref 1–41)
ALT SERPL W P-5'-P-CCNC: 39 U/L (ref 1–41)
ALT SERPL-CCNC: 26 IU/L (ref 0–44)
ALT SERPL-CCNC: 29 U/L (ref 1–41)
ALT SERPL-CCNC: 35 U/L (ref 1–41)
AMMONIA BLD-SCNC: 32 UMOL/L (ref 16–60)
AMMONIA BLD-SCNC: 33 UMOL/L (ref 16–60)
AMMONIA BLD-SCNC: 51 UMOL/L (ref 16–60)
AMPHET+METHAMPHET UR QL: NEGATIVE
ANION GAP SERPL CALCULATED.3IONS-SCNC: 10 MMOL/L (ref 5–15)
ANION GAP SERPL CALCULATED.3IONS-SCNC: 10.6 MMOL/L (ref 5–15)
ANION GAP SERPL CALCULATED.3IONS-SCNC: 11 MMOL/L (ref 5–15)
ANION GAP SERPL CALCULATED.3IONS-SCNC: 14.1 MMOL/L (ref 5–15)
ANION GAP SERPL CALCULATED.3IONS-SCNC: 6 MMOL/L (ref 5–15)
ANION GAP SERPL CALCULATED.3IONS-SCNC: 6 MMOL/L (ref 5–15)
ANION GAP SERPL CALCULATED.3IONS-SCNC: 7 MMOL/L (ref 5–15)
ANION GAP SERPL CALCULATED.3IONS-SCNC: 7 MMOL/L (ref 5–15)
APAP SERPL-MCNC: <5 MCG/ML (ref 0–30)
APTT PPP: 46.9 SECONDS (ref 25.6–35.3)
APTT PPP: 57.5 SECONDS (ref 25.6–35.3)
ARTERIAL PATENCY WRIST A: ABNORMAL
ARTERIAL PATENCY WRIST A: POSITIVE
AST SERPL-CCNC: 145 U/L (ref 1–40)
AST SERPL-CCNC: 38 U/L (ref 1–40)
AST SERPL-CCNC: 45 U/L (ref 1–40)
AST SERPL-CCNC: 54 U/L (ref 1–40)
AST SERPL-CCNC: 72 U/L (ref 1–40)
AST SERPL-CCNC: 91 IU/L (ref 0–40)
AST SERPL-CCNC: 91 U/L (ref 1–40)
ATMOSPHERIC PRESS: 735 MMHG
ATMOSPHERIC PRESS: ABNORMAL MM[HG]
BACTERIA SPEC AEROBE CULT: ABNORMAL
BACTERIA SPEC AEROBE CULT: NORMAL
BACTERIA SPEC AEROBE CULT: NORMAL
BACTERIA UR QL AUTO: ABNORMAL /HPF
BARBITURATES UR QL SCN: NEGATIVE
BASE EXCESS BLDA CALC-SCNC: -8.2 MMOL/L (ref 0–2)
BASE EXCESS BLDA CALC-SCNC: -8.7 MMOL/L (ref 0–2)
BASOPHILS # BLD AUTO: 0.03 10*3/MM3 (ref 0–0.2)
BASOPHILS # BLD AUTO: 0.04 10*3/MM3 (ref 0–0.2)
BASOPHILS # BLD AUTO: 0.06 10*3/MM3 (ref 0–0.2)
BASOPHILS # BLD AUTO: 0.07 10*3/MM3 (ref 0–0.2)
BASOPHILS # BLD AUTO: 0.1 X10E3/UL (ref 0–0.2)
BASOPHILS NFR BLD AUTO: 0.4 % (ref 0–1.5)
BASOPHILS NFR BLD AUTO: 0.5 % (ref 0–1.5)
BASOPHILS NFR BLD AUTO: 0.8 % (ref 0–1.5)
BASOPHILS NFR BLD AUTO: 1 %
BASOPHILS NFR BLD AUTO: 1.1 % (ref 0–1.5)
BDY SITE: ABNORMAL
BDY SITE: ABNORMAL
BENZODIAZ UR QL SCN: NEGATIVE
BH CV ECHO MEAS - % IVS THICK: 6.6 %
BH CV ECHO MEAS - % LVPW THICK: 28.5 %
BH CV ECHO MEAS - ACS: 2.7 CM
BH CV ECHO MEAS - AO MAX PG: 3.8 MMHG
BH CV ECHO MEAS - AO MEAN PG: 2 MMHG
BH CV ECHO MEAS - AO ROOT AREA (BSA CORRECTED): 1.8
BH CV ECHO MEAS - AO ROOT AREA: 10.2 CM^2
BH CV ECHO MEAS - AO ROOT DIAM: 3.6 CM
BH CV ECHO MEAS - AO V2 MAX: 97.6 CM/SEC
BH CV ECHO MEAS - AO V2 MEAN: 65.9 CM/SEC
BH CV ECHO MEAS - AO V2 VTI: 23.6 CM
BH CV ECHO MEAS - BSA(HAYCOCK): 2 M^2
BH CV ECHO MEAS - BSA: 2 M^2
BH CV ECHO MEAS - BZI_BMI: 26.9 KILOGRAMS/M^2
BH CV ECHO MEAS - BZI_METRIC_HEIGHT: 175.3 CM
BH CV ECHO MEAS - BZI_METRIC_WEIGHT: 82.6 KG
BH CV ECHO MEAS - EDV(CUBED): 102.5 ML
BH CV ECHO MEAS - EDV(TEICH): 101.3 ML
BH CV ECHO MEAS - EF(CUBED): 78.5 %
BH CV ECHO MEAS - EF(TEICH): 70.7 %
BH CV ECHO MEAS - ESV(CUBED): 22.1 ML
BH CV ECHO MEAS - ESV(TEICH): 29.7 ML
BH CV ECHO MEAS - FS: 40.1 %
BH CV ECHO MEAS - IVS/LVPW: 1.1
BH CV ECHO MEAS - IVSD: 1.1 CM
BH CV ECHO MEAS - IVSS: 1.2 CM
BH CV ECHO MEAS - LA DIMENSION: 4.4 CM
BH CV ECHO MEAS - LA/AO: 1.2
BH CV ECHO MEAS - LV MASS(C)D: 185.7 GRAMS
BH CV ECHO MEAS - LV MASS(C)DI: 93.6 GRAMS/M^2
BH CV ECHO MEAS - LV MASS(C)S: 111.4 GRAMS
BH CV ECHO MEAS - LV MASS(C)SI: 56.2 GRAMS/M^2
BH CV ECHO MEAS - LVIDD: 4.7 CM
BH CV ECHO MEAS - LVIDS: 2.8 CM
BH CV ECHO MEAS - LVOT AREA (M): 3.8 CM^2
BH CV ECHO MEAS - LVOT AREA: 3.8 CM^2
BH CV ECHO MEAS - LVOT DIAM: 2.2 CM
BH CV ECHO MEAS - LVPWD: 1.1 CM
BH CV ECHO MEAS - LVPWS: 1.4 CM
BH CV ECHO MEAS - MV A MAX VEL: 71.6 CM/SEC
BH CV ECHO MEAS - MV E MAX VEL: 53.1 CM/SEC
BH CV ECHO MEAS - MV E/A: 0.74
BH CV ECHO MEAS - PA ACC TIME: 0.07 SEC
BH CV ECHO MEAS - PA PR(ACCEL): 47.5 MMHG
BH CV ECHO MEAS - SI(AO): 121 ML/M^2
BH CV ECHO MEAS - SI(CUBED): 40.5 ML/M^2
BH CV ECHO MEAS - SI(TEICH): 36.1 ML/M^2
BH CV ECHO MEAS - SV(AO): 240.2 ML
BH CV ECHO MEAS - SV(CUBED): 80.4 ML
BH CV ECHO MEAS - SV(TEICH): 71.7 ML
BILIRUB BLD-MCNC: ABNORMAL MG/DL
BILIRUB SERPL-MCNC: 0.6 MG/DL (ref 0–1.2)
BILIRUB SERPL-MCNC: 0.8 MG/DL (ref 0–1.2)
BILIRUB SERPL-MCNC: 0.8 MG/DL (ref 0–1.2)
BILIRUB SERPL-MCNC: 0.9 MG/DL (ref 0–1.2)
BILIRUB UR QL STRIP: ABNORMAL
BODY TEMPERATURE: 0 C
BODY TEMPERATURE: 37 C
BUN SERPL-MCNC: 13 MG/DL (ref 8–23)
BUN SERPL-MCNC: 17 MG/DL (ref 8–27)
BUN SERPL-MCNC: 24 MG/DL (ref 8–23)
BUN SERPL-MCNC: 33 MG/DL (ref 8–23)
BUN SERPL-MCNC: 33 MG/DL (ref 8–23)
BUN SERPL-MCNC: 36 MG/DL (ref 8–23)
BUN SERPL-MCNC: 40 MG/DL (ref 8–23)
BUN SERPL-MCNC: 44 MG/DL (ref 8–23)
BUN SERPL-MCNC: 49 MG/DL (ref 8–23)
BUN SERPL-MCNC: 54 MG/DL (ref 8–23)
BUN SERPL-MCNC: 56 MG/DL (ref 8–23)
BUN/CREAT SERPL: 10.3 (ref 7–25)
BUN/CREAT SERPL: 13.6 (ref 7–25)
BUN/CREAT SERPL: 15.7 (ref 7–25)
BUN/CREAT SERPL: 16.1 (ref 7–25)
BUN/CREAT SERPL: 16.6 (ref 7–25)
BUN/CREAT SERPL: 16.6 (ref 7–25)
BUN/CREAT SERPL: 16.7 (ref 7–25)
BUN/CREAT SERPL: 17.4 (ref 7–25)
BUN/CREAT SERPL: 19 (ref 7–25)
BUN/CREAT SERPL: 7.6 (ref 7–25)
BUN/CREAT SERPL: 8 (ref 10–24)
BUPRENORPHINE SERPL-MCNC: NEGATIVE NG/ML
CALCIUM SERPL-MCNC: 8.7 MG/DL (ref 8.6–10.5)
CALCIUM SERPL-MCNC: 8.8 MG/DL (ref 8.6–10.5)
CALCIUM SERPL-MCNC: 9.1 MG/DL (ref 8.6–10.2)
CALCIUM SPEC-SCNC: 8.2 MG/DL (ref 8.6–10.5)
CALCIUM SPEC-SCNC: 8.4 MG/DL (ref 8.6–10.5)
CALCIUM SPEC-SCNC: 8.7 MG/DL (ref 8.6–10.5)
CALCIUM SPEC-SCNC: 8.8 MG/DL (ref 8.6–10.5)
CALCIUM SPEC-SCNC: 8.8 MG/DL (ref 8.6–10.5)
CALCIUM SPEC-SCNC: 9 MG/DL (ref 8.6–10.5)
CALCIUM SPEC-SCNC: 9 MG/DL (ref 8.6–10.5)
CALCIUM SPEC-SCNC: 9.1 MG/DL (ref 8.6–10.5)
CANNABINOIDS SERPL QL: NEGATIVE
CHLORIDE SERPL-SCNC: 103 MMOL/L (ref 98–107)
CHLORIDE SERPL-SCNC: 107 MMOL/L (ref 98–107)
CHLORIDE SERPL-SCNC: 107 MMOL/L (ref 98–107)
CHLORIDE SERPL-SCNC: 108 MMOL/L (ref 96–106)
CHLORIDE SERPL-SCNC: 108 MMOL/L (ref 98–107)
CHLORIDE SERPL-SCNC: 113 MMOL/L (ref 98–107)
CHLORIDE SERPL-SCNC: 114 MMOL/L (ref 98–107)
CHLORIDE SERPL-SCNC: 117 MMOL/L (ref 98–107)
CHLORIDE SERPL-SCNC: 118 MMOL/L (ref 98–107)
CHOLEST SERPL-MCNC: 65 MG/DL (ref 0–200)
CK SERPL-CCNC: 43 U/L (ref 20–200)
CLARITY UR: ABNORMAL
CLARITY, POC: CLEAR
CO2 BLDA-SCNC: 15.7 MMOL/L (ref 22–33)
CO2 BLDA-SCNC: 17.9 MMOL/L (ref 22–33)
CO2 SERPL-SCNC: 13 MMOL/L (ref 22–29)
CO2 SERPL-SCNC: 13 MMOL/L (ref 22–29)
CO2 SERPL-SCNC: 14 MMOL/L (ref 22–29)
CO2 SERPL-SCNC: 14.9 MMOL/L (ref 22–29)
CO2 SERPL-SCNC: 15.4 MMOL/L (ref 22–29)
CO2 SERPL-SCNC: 17 MMOL/L (ref 22–29)
CO2 SERPL-SCNC: 19 MMOL/L (ref 20–29)
CO2 SERPL-SCNC: 20.8 MMOL/L (ref 22–29)
CO2 SERPL-SCNC: 21.5 MMOL/L (ref 22–29)
COCAINE UR QL: NEGATIVE
COHGB MFR BLD: 0.8 % (ref 0–2)
COHGB MFR BLD: 1.1 % (ref 0–5)
COLOR UR: ABNORMAL
COLOR UR: YELLOW
CREAT SERPL-MCNC: 1.7 MG/DL (ref 0.76–1.27)
CREAT SERPL-MCNC: 1.74 MG/DL (ref 0.76–1.27)
CREAT SERPL-MCNC: 1.98 MG/DL (ref 0.76–1.27)
CREAT SERPL-MCNC: 2.17 MG/DL (ref 0.76–1.27)
CREAT SERPL-MCNC: 2.18 MG/DL (ref 0.76–1.27)
CREAT SERPL-MCNC: 2.3 MG/DL (ref 0.76–1.27)
CREAT SERPL-MCNC: 2.33 MG/DL (ref 0.76–1.27)
CREAT SERPL-MCNC: 2.74 MG/DL (ref 0.76–1.27)
CREAT SERPL-MCNC: 2.95 MG/DL (ref 0.76–1.27)
CREAT SERPL-MCNC: 3.44 MG/DL (ref 0.76–1.27)
CREAT SERPL-MCNC: 4.12 MG/DL (ref 0.76–1.27)
CRP SERPL-MCNC: 12.85 MG/DL (ref 0–0.5)
D-LACTATE SERPL-SCNC: 0.9 MMOL/L (ref 0.5–2)
D-LACTATE SERPL-SCNC: 1 MMOL/L (ref 0.5–2)
D-LACTATE SERPL-SCNC: 3.1 MMOL/L (ref 0.5–2)
D-LACTATE SERPL-SCNC: 3.3 MMOL/L (ref 0.5–2)
DEPRECATED RDW RBC AUTO: 69.9 FL (ref 37–54)
DEPRECATED RDW RBC AUTO: 71.3 FL (ref 37–54)
DEPRECATED RDW RBC AUTO: 71.4 FL (ref 37–54)
DEPRECATED RDW RBC AUTO: 72.3 FL (ref 37–54)
DEPRECATED RDW RBC AUTO: 73.8 FL (ref 37–54)
DEPRECATED RDW RBC AUTO: 75.2 FL (ref 37–54)
EOSINOPHIL # BLD AUTO: 0.07 10*3/MM3 (ref 0–0.4)
EOSINOPHIL # BLD AUTO: 0.12 10*3/MM3 (ref 0–0.4)
EOSINOPHIL # BLD AUTO: 0.17 10*3/MM3 (ref 0–0.4)
EOSINOPHIL # BLD AUTO: 0.2 X10E3/UL (ref 0–0.4)
EOSINOPHIL # BLD AUTO: 0.31 10*3/MM3 (ref 0–0.4)
EOSINOPHIL NFR BLD AUTO: 0.9 % (ref 0.3–6.2)
EOSINOPHIL NFR BLD AUTO: 1.6 % (ref 0.3–6.2)
EOSINOPHIL NFR BLD AUTO: 2.6 % (ref 0.3–6.2)
EOSINOPHIL NFR BLD AUTO: 3 %
EOSINOPHIL NFR BLD AUTO: 4.2 % (ref 0.3–6.2)
EPAP: 0
ERYTHROCYTE [DISTWIDTH] IN BLOOD BY AUTOMATED COUNT: 14.1 % (ref 12.3–15.4)
ERYTHROCYTE [DISTWIDTH] IN BLOOD BY AUTOMATED COUNT: 17 % (ref 11.6–15.4)
ERYTHROCYTE [DISTWIDTH] IN BLOOD BY AUTOMATED COUNT: 20.6 % (ref 12.3–15.4)
ERYTHROCYTE [DISTWIDTH] IN BLOOD BY AUTOMATED COUNT: 20.7 % (ref 12.3–15.4)
ERYTHROCYTE [DISTWIDTH] IN BLOOD BY AUTOMATED COUNT: 21 % (ref 12.3–15.4)
ERYTHROCYTE [DISTWIDTH] IN BLOOD BY AUTOMATED COUNT: 21.2 % (ref 12.3–15.4)
ERYTHROCYTE [DISTWIDTH] IN BLOOD BY AUTOMATED COUNT: 21.4 % (ref 12.3–15.4)
ERYTHROCYTE [DISTWIDTH] IN BLOOD BY AUTOMATED COUNT: 21.4 % (ref 12.3–15.4)
ERYTHROCYTE [SEDIMENTATION RATE] IN BLOOD: 35 MM/HR (ref 0–20)
ETHANOL BLD-MCNC: <10 MG/DL (ref 0–10)
ETHANOL UR QL: <0.01 %
FLUAV RNA RESP QL NAA+PROBE: NOT DETECTED
FLUAV RNA RESP QL NAA+PROBE: NOT DETECTED
FLUBV RNA RESP QL NAA+PROBE: NOT DETECTED
FLUBV RNA RESP QL NAA+PROBE: NOT DETECTED
GFR SERPL CREATININE-BSD FRML MDRD: 14 ML/MIN/1.73
GFR SERPL CREATININE-BSD FRML MDRD: 18 ML/MIN/1.73
GFR SERPL CREATININE-BSD FRML MDRD: 21 ML/MIN/1.73
GFR SERPL CREATININE-BSD FRML MDRD: 23 ML/MIN/1.73
GFR SERPL CREATININE-BSD FRML MDRD: 28 ML/MIN/1.73
GFR SERPL CREATININE-BSD FRML MDRD: 30 ML/MIN/1.73
GFR SERPL CREATININE-BSD FRML MDRD: 33 ML/MIN/1.73
GFR SERPL CREATININE-BSD FRML MDRD: 39 ML/MIN/1.73
GFR SERPL CREATININE-BSD FRML MDRD: ABNORMAL ML/MIN/{1.73_M2}
GLOBULIN SER CALC-MCNC: 3.3 GM/DL
GLOBULIN SER CALC-MCNC: 3.6 GM/DL
GLOBULIN SER CALC-MCNC: 4 G/DL (ref 1.5–4.5)
GLOBULIN UR ELPH-MCNC: 2.3 GM/DL
GLOBULIN UR ELPH-MCNC: 2.4 GM/DL
GLOBULIN UR ELPH-MCNC: 2.8 GM/DL
GLOBULIN UR ELPH-MCNC: 3.6 GM/DL
GLUCOSE BLDC GLUCOMTR-MCNC: 102 MG/DL (ref 70–130)
GLUCOSE BLDC GLUCOMTR-MCNC: 105 MG/DL (ref 70–130)
GLUCOSE BLDC GLUCOMTR-MCNC: 107 MG/DL (ref 70–130)
GLUCOSE BLDC GLUCOMTR-MCNC: 111 MG/DL (ref 70–130)
GLUCOSE BLDC GLUCOMTR-MCNC: 147 MG/DL (ref 70–130)
GLUCOSE BLDC GLUCOMTR-MCNC: 155 MG/DL (ref 70–130)
GLUCOSE BLDC GLUCOMTR-MCNC: 155 MG/DL (ref 70–130)
GLUCOSE BLDC GLUCOMTR-MCNC: 158 MG/DL (ref 70–130)
GLUCOSE BLDC GLUCOMTR-MCNC: 171 MG/DL (ref 70–130)
GLUCOSE BLDC GLUCOMTR-MCNC: 210 MG/DL (ref 70–130)
GLUCOSE BLDC GLUCOMTR-MCNC: 68 MG/DL (ref 70–130)
GLUCOSE BLDC GLUCOMTR-MCNC: 73 MG/DL (ref 70–130)
GLUCOSE BLDC GLUCOMTR-MCNC: 74 MG/DL (ref 70–130)
GLUCOSE BLDC GLUCOMTR-MCNC: 75 MG/DL (ref 70–130)
GLUCOSE BLDC GLUCOMTR-MCNC: 77 MG/DL (ref 70–130)
GLUCOSE BLDC GLUCOMTR-MCNC: 78 MG/DL (ref 70–130)
GLUCOSE BLDC GLUCOMTR-MCNC: 79 MG/DL (ref 70–130)
GLUCOSE BLDC GLUCOMTR-MCNC: 80 MG/DL (ref 70–130)
GLUCOSE BLDC GLUCOMTR-MCNC: 80 MG/DL (ref 70–130)
GLUCOSE BLDC GLUCOMTR-MCNC: 84 MG/DL (ref 70–130)
GLUCOSE BLDC GLUCOMTR-MCNC: 84 MG/DL (ref 70–130)
GLUCOSE BLDC GLUCOMTR-MCNC: 85 MG/DL (ref 70–130)
GLUCOSE BLDC GLUCOMTR-MCNC: 86 MG/DL (ref 70–130)
GLUCOSE BLDC GLUCOMTR-MCNC: 88 MG/DL (ref 70–130)
GLUCOSE BLDC GLUCOMTR-MCNC: 92 MG/DL (ref 70–130)
GLUCOSE BLDC GLUCOMTR-MCNC: 95 MG/DL (ref 70–130)
GLUCOSE BLDC GLUCOMTR-MCNC: 95 MG/DL (ref 70–130)
GLUCOSE BLDC GLUCOMTR-MCNC: 97 MG/DL (ref 70–130)
GLUCOSE BLDC GLUCOMTR-MCNC: 97 MG/DL (ref 70–130)
GLUCOSE SERPL-MCNC: 112 MG/DL (ref 65–99)
GLUCOSE SERPL-MCNC: 133 MG/DL (ref 65–99)
GLUCOSE SERPL-MCNC: 142 MG/DL (ref 65–99)
GLUCOSE SERPL-MCNC: 148 MG/DL (ref 65–99)
GLUCOSE SERPL-MCNC: 164 MG/DL (ref 65–99)
GLUCOSE SERPL-MCNC: 175 MG/DL (ref 65–99)
GLUCOSE SERPL-MCNC: 66 MG/DL (ref 65–99)
GLUCOSE SERPL-MCNC: 84 MG/DL (ref 65–99)
GLUCOSE SERPL-MCNC: 88 MG/DL (ref 65–99)
GLUCOSE SERPL-MCNC: 90 MG/DL (ref 65–99)
GLUCOSE SERPL-MCNC: 98 MG/DL (ref 65–99)
GLUCOSE UR STRIP-MCNC: ABNORMAL MG/DL
GLUCOSE UR STRIP-MCNC: ABNORMAL MG/DL
GRAN CASTS URNS QL MICRO: ABNORMAL /LPF
HBA1C MFR BLD: 6.2 % (ref 4.8–5.6)
HBA1C MFR BLD: 6.4 % (ref 4.8–5.6)
HCO3 BLDA-SCNC: 14.9 MMOL/L (ref 20–26)
HCO3 BLDA-SCNC: 16.9 MMOL/L (ref 20–26)
HCT VFR BLD AUTO: 30.8 % (ref 37.5–51)
HCT VFR BLD AUTO: 32.5 % (ref 37.5–51)
HCT VFR BLD AUTO: 34.3 % (ref 37.5–51)
HCT VFR BLD AUTO: 34.8 % (ref 37.5–51)
HCT VFR BLD AUTO: 37.1 % (ref 37.5–51)
HCT VFR BLD AUTO: 38.6 % (ref 37.5–51)
HCT VFR BLD AUTO: 40.6 % (ref 37.5–51)
HCT VFR BLD AUTO: 41.7 % (ref 37.5–51)
HCT VFR BLD CALC: 31.7 %
HCT VFR BLD CALC: 35.4 % (ref 38–51)
HDLC SERPL-MCNC: 16 MG/DL (ref 40–60)
HGB BLD-MCNC: 10 G/DL (ref 13–17.7)
HGB BLD-MCNC: 10.6 G/DL (ref 13–17.7)
HGB BLD-MCNC: 11 G/DL (ref 13–17.7)
HGB BLD-MCNC: 11.5 G/DL (ref 13–17.7)
HGB BLD-MCNC: 12.7 G/DL (ref 13–17.7)
HGB BLD-MCNC: 12.9 G/DL (ref 13–17.7)
HGB BLD-MCNC: 13.4 G/DL (ref 13–17.7)
HGB BLD-MCNC: 9.7 G/DL (ref 13–17.7)
HGB BLDA-MCNC: 10.3 G/DL (ref 13.5–17.5)
HGB BLDA-MCNC: 11.6 G/DL (ref 14–18)
HGB UR QL STRIP.AUTO: ABNORMAL
HOLD SPECIMEN: NORMAL
HOLD SPECIMEN: NORMAL
HYALINE CASTS UR QL AUTO: ABNORMAL /LPF
IMM GRANULOCYTES # BLD AUTO: 0 X10E3/UL (ref 0–0.1)
IMM GRANULOCYTES # BLD AUTO: 0.02 10*3/MM3 (ref 0–0.05)
IMM GRANULOCYTES # BLD AUTO: 0.02 10*3/MM3 (ref 0–0.05)
IMM GRANULOCYTES # BLD AUTO: 0.03 10*3/MM3 (ref 0–0.05)
IMM GRANULOCYTES # BLD AUTO: 0.04 10*3/MM3 (ref 0–0.05)
IMM GRANULOCYTES NFR BLD AUTO: 0 %
IMM GRANULOCYTES NFR BLD AUTO: 0.3 % (ref 0–0.5)
IMM GRANULOCYTES NFR BLD AUTO: 0.3 % (ref 0–0.5)
IMM GRANULOCYTES NFR BLD AUTO: 0.4 % (ref 0–0.5)
IMM GRANULOCYTES NFR BLD AUTO: 0.5 % (ref 0–0.5)
INHALED O2 CONCENTRATION: 21 %
INHALED O2 CONCENTRATION: 36 %
INR PPP: 1.54 (ref 0.9–1.1)
INR PPP: 1.6 (ref 0.9–1.2)
INR PPP: 2.57 (ref 0.85–1.16)
INR PPP: 2.71 (ref 0.9–1.1)
INR PPP: 3.66 (ref 0.85–1.16)
IPAP: 0
KETONES UR QL STRIP: ABNORMAL
KETONES UR QL: NEGATIVE
LDLC SERPL CALC-MCNC: 26 MG/DL (ref 0–100)
LEUKOCYTE EST, POC: NEGATIVE
LEUKOCYTE ESTERASE UR QL STRIP.AUTO: ABNORMAL
LIPASE SERPL-CCNC: 103 U/L (ref 13–60)
LYMPHOCYTES # BLD AUTO: 0.8 X10E3/UL (ref 0.7–3.1)
LYMPHOCYTES # BLD AUTO: 0.86 10*3/MM3 (ref 0.7–3.1)
LYMPHOCYTES # BLD AUTO: 0.91 10*3/MM3 (ref 0.7–3.1)
LYMPHOCYTES # BLD AUTO: 0.97 10*3/MM3 (ref 0.7–3.1)
LYMPHOCYTES # BLD AUTO: 1.03 10*3/MM3 (ref 0.7–3.1)
LYMPHOCYTES NFR BLD AUTO: 11.7 % (ref 19.6–45.3)
LYMPHOCYTES NFR BLD AUTO: 12.7 % (ref 19.6–45.3)
LYMPHOCYTES NFR BLD AUTO: 13 % (ref 19.6–45.3)
LYMPHOCYTES NFR BLD AUTO: 14 % (ref 19.6–45.3)
LYMPHOCYTES NFR BLD AUTO: 15 %
Lab: ABNORMAL
MAGNESIUM SERPL-MCNC: 2.4 MG/DL (ref 1.6–2.4)
MAXIMAL PREDICTED HEART RATE: 146 BPM
MCH RBC QN AUTO: 29.1 PG (ref 26.6–33)
MCH RBC QN AUTO: 29.2 PG (ref 26.6–33)
MCH RBC QN AUTO: 29.3 PG (ref 26.6–33)
MCH RBC QN AUTO: 29.3 PG (ref 26.6–33)
MCH RBC QN AUTO: 29.4 PG (ref 26.6–33)
MCH RBC QN AUTO: 29.6 PG (ref 26.6–33)
MCH RBC QN AUTO: 29.8 PG (ref 26.6–33)
MCH RBC QN AUTO: 30.1 PG (ref 26.6–33)
MCHC RBC AUTO-ENTMCNC: 30.8 G/DL (ref 31.5–35.7)
MCHC RBC AUTO-ENTMCNC: 30.9 G/DL (ref 31.5–35.7)
MCHC RBC AUTO-ENTMCNC: 31 G/DL (ref 31.5–35.7)
MCHC RBC AUTO-ENTMCNC: 31.5 G/DL (ref 31.5–35.7)
MCHC RBC AUTO-ENTMCNC: 31.6 G/DL (ref 31.5–35.7)
MCHC RBC AUTO-ENTMCNC: 31.8 G/DL (ref 31.5–35.7)
MCHC RBC AUTO-ENTMCNC: 32.1 G/DL (ref 31.5–35.7)
MCHC RBC AUTO-ENTMCNC: 32.9 G/DL (ref 31.5–35.7)
MCV RBC AUTO: 90.5 FL (ref 79–97)
MCV RBC AUTO: 91 FL (ref 79–97)
MCV RBC AUTO: 92.5 FL (ref 79–97)
MCV RBC AUTO: 92.8 FL (ref 79–97)
MCV RBC AUTO: 94.4 FL (ref 79–97)
MCV RBC AUTO: 95.1 FL (ref 79–97)
MCV RBC AUTO: 95.3 FL (ref 79–97)
MCV RBC AUTO: 95.8 FL (ref 79–97)
METHADONE UR QL SCN: NEGATIVE
METHGB BLD QL: 0.5 % (ref 0–1.5)
METHGB BLD QL: <1 % (ref 0–3)
MODALITY: ABNORMAL
MODALITY: ABNORMAL
MONOCYTES # BLD AUTO: 0.5 X10E3/UL (ref 0.1–0.9)
MONOCYTES # BLD AUTO: 0.52 10*3/MM3 (ref 0.1–0.9)
MONOCYTES # BLD AUTO: 0.55 10*3/MM3 (ref 0.1–0.9)
MONOCYTES # BLD AUTO: 0.58 10*3/MM3 (ref 0.1–0.9)
MONOCYTES # BLD AUTO: 0.65 10*3/MM3 (ref 0.1–0.9)
MONOCYTES NFR BLD AUTO: 10 %
MONOCYTES NFR BLD AUTO: 7.1 % (ref 5–12)
MONOCYTES NFR BLD AUTO: 7.3 % (ref 5–12)
MONOCYTES NFR BLD AUTO: 8.5 % (ref 5–12)
MONOCYTES NFR BLD AUTO: 8.5 % (ref 5–12)
NEUTROPHILS # BLD AUTO: 3.9 X10E3/UL (ref 1.4–7)
NEUTROPHILS # BLD AUTO: 4.76 10*3/MM3 (ref 1.7–7)
NEUTROPHILS NFR BLD AUTO: 5.6 10*3/MM3 (ref 1.7–7)
NEUTROPHILS NFR BLD AUTO: 5.84 10*3/MM3 (ref 1.7–7)
NEUTROPHILS NFR BLD AUTO: 6.16 10*3/MM3 (ref 1.7–7)
NEUTROPHILS NFR BLD AUTO: 71 %
NEUTROPHILS NFR BLD AUTO: 73.5 % (ref 42.7–76)
NEUTROPHILS NFR BLD AUTO: 75.9 % (ref 42.7–76)
NEUTROPHILS NFR BLD AUTO: 76.3 % (ref 42.7–76)
NEUTROPHILS NFR BLD AUTO: 77.9 % (ref 42.7–76)
NITRITE UR QL STRIP: NEGATIVE
NITRITE UR-MCNC: NEGATIVE MG/ML
NOTE: ABNORMAL
NOTE: ABNORMAL
NRBC BLD AUTO-RTO: 0 /100 WBC (ref 0–0.2)
NT-PROBNP SERPL-MCNC: 938.7 PG/ML (ref 0–900)
OPIATES UR QL: NEGATIVE
OXYCODONE UR QL SCN: NEGATIVE
OXYHGB MFR BLDV: 92.8 % (ref 94–99)
OXYHGB MFR BLDV: 95.2 % (ref 94–99)
PAW @ PEAK INSP FLOW SETTING VENT: 0 CMH2O
PCO2 BLDA: 25.2 MM HG (ref 35–45)
PCO2 BLDA: 32.3 MM HG (ref 35–45)
PCO2 TEMP ADJ BLD: 32.3 MM HG (ref 35–48)
PCO2 TEMP ADJ BLD: ABNORMAL MM[HG]
PCP UR QL SCN: NEGATIVE
PH BLDA: 7.33 PH UNITS (ref 7.35–7.45)
PH BLDA: 7.38 PH UNITS (ref 7.35–7.45)
PH UR STRIP.AUTO: <=5 [PH] (ref 5–8)
PH UR: 5.5 [PH] (ref 5–8)
PH, TEMP CORRECTED: 7.33 PH UNITS
PH, TEMP CORRECTED: ABNORMAL
PHOSPHATE SERPL-MCNC: 2.6 MG/DL (ref 2.5–4.5)
PLATELET # BLD AUTO: 173 10*3/MM3 (ref 140–450)
PLATELET # BLD AUTO: 176 10*3/MM3 (ref 140–450)
PLATELET # BLD AUTO: 179 10*3/MM3 (ref 140–450)
PLATELET # BLD AUTO: 189 10*3/MM3 (ref 140–450)
PLATELET # BLD AUTO: 194 10*3/MM3 (ref 140–450)
PLATELET # BLD AUTO: 195 X10E3/UL (ref 150–450)
PLATELET # BLD AUTO: 196 10*3/MM3 (ref 140–450)
PLATELET # BLD AUTO: 212 10*3/MM3 (ref 140–450)
PLATELET # BLD AUTO: 215 10*3/MM3 (ref 140–450)
PMV BLD AUTO: 10 FL (ref 6–12)
PMV BLD AUTO: 10.2 FL (ref 6–12)
PMV BLD AUTO: 10.6 FL (ref 6–12)
PMV BLD AUTO: 10.7 FL (ref 6–12)
PMV BLD AUTO: 9.7 FL (ref 6–12)
PMV BLD AUTO: 9.9 FL (ref 6–12)
PO2 BLDA: 67.2 MM HG (ref 83–108)
PO2 BLDA: 79.2 MM HG (ref 83–108)
PO2 TEMP ADJ BLD: 79.2 MM HG (ref 83–108)
PO2 TEMP ADJ BLD: ABNORMAL MM[HG]
POTASSIUM SERPL-SCNC: 4.4 MMOL/L (ref 3.5–5.2)
POTASSIUM SERPL-SCNC: 4.5 MMOL/L (ref 3.5–5.2)
POTASSIUM SERPL-SCNC: 4.6 MMOL/L (ref 3.5–5.2)
POTASSIUM SERPL-SCNC: 4.6 MMOL/L (ref 3.5–5.2)
POTASSIUM SERPL-SCNC: 4.7 MMOL/L (ref 3.5–5.2)
POTASSIUM SERPL-SCNC: 4.8 MMOL/L (ref 3.5–5.2)
POTASSIUM SERPL-SCNC: 4.9 MMOL/L (ref 3.5–5.2)
POTASSIUM SERPL-SCNC: 4.9 MMOL/L (ref 3.5–5.2)
POTASSIUM SERPL-SCNC: 5.1 MMOL/L (ref 3.5–5.2)
POTASSIUM SERPL-SCNC: 5.1 MMOL/L (ref 3.5–5.2)
POTASSIUM SERPL-SCNC: 5.5 MMOL/L (ref 3.5–5.2)
PROCALCITONIN SERPL-MCNC: 0.56 NG/ML (ref 0–0.25)
PROT SERPL-MCNC: 5.2 G/DL (ref 6–8.5)
PROT SERPL-MCNC: 5.2 G/DL (ref 6–8.5)
PROT SERPL-MCNC: 5.3 G/DL (ref 6–8.5)
PROT SERPL-MCNC: 5.5 G/DL (ref 6–8.5)
PROT SERPL-MCNC: 5.6 G/DL (ref 6–8.5)
PROT SERPL-MCNC: 6.1 G/DL (ref 6–8.5)
PROT SERPL-MCNC: 6.5 G/DL (ref 6–8.5)
PROT UR QL STRIP: ABNORMAL
PROT UR STRIP-MCNC: ABNORMAL MG/DL
PROTHROMBIN TIME: 16.9 SEC (ref 9.1–12)
PROTHROMBIN TIME: 18.4 SECONDS (ref 11.9–14.1)
PROTHROMBIN TIME: 27.3 SECONDS (ref 11.5–14)
PROTHROMBIN TIME: 28.7 SECONDS (ref 11.9–14.1)
PROTHROMBIN TIME: 36.1 SECONDS (ref 11.5–14)
QT INTERVAL: 458 MS
QT INTERVAL: 486 MS
QT INTERVAL: 506 MS
QTC INTERVAL: 476 MS
QTC INTERVAL: 505 MS
QTC INTERVAL: 522 MS
RBC # BLD AUTO: 3.32 10*6/MM3 (ref 4.14–5.8)
RBC # BLD AUTO: 3.41 10*6/MM3 (ref 4.14–5.8)
RBC # BLD AUTO: 3.58 10*6/MM3 (ref 4.14–5.8)
RBC # BLD AUTO: 3.66 10*6/MM3 (ref 4.14–5.8)
RBC # BLD AUTO: 3.93 10*6/MM3 (ref 4.14–5.8)
RBC # BLD AUTO: 4.26 X10E6/UL (ref 4.14–5.8)
RBC # BLD AUTO: 4.39 10*6/MM3 (ref 4.14–5.8)
RBC # BLD AUTO: 4.61 10*6/MM3 (ref 4.14–5.8)
RBC # UR STRIP: NEGATIVE /UL
RBC # UR: ABNORMAL /HPF
REF LAB TEST METHOD: ABNORMAL
SALICYLATES SERPL-MCNC: <0.3 MG/DL
SAO2 % BLDCOA: 93.9 % (ref 94–99)
SARS-COV-2 RNA RESP QL NAA+PROBE: NOT DETECTED
SARS-COV-2 RNA RESP QL NAA+PROBE: NOT DETECTED
SODIUM SERPL-SCNC: 132 MMOL/L (ref 136–145)
SODIUM SERPL-SCNC: 134 MMOL/L (ref 136–145)
SODIUM SERPL-SCNC: 136 MMOL/L (ref 136–145)
SODIUM SERPL-SCNC: 138 MMOL/L (ref 136–145)
SODIUM SERPL-SCNC: 138 MMOL/L (ref 136–145)
SODIUM SERPL-SCNC: 139 MMOL/L (ref 134–144)
SODIUM SERPL-SCNC: 139 MMOL/L (ref 136–145)
SODIUM SERPL-SCNC: 140 MMOL/L (ref 136–145)
SODIUM SERPL-SCNC: 141 MMOL/L (ref 136–145)
SODIUM UR-SCNC: <20 MMOL/L
SP GR UR STRIP: 1.03 (ref 1–1.03)
SP GR UR: 1.02 (ref 1–1.03)
SQUAMOUS #/AREA URNS HPF: ABNORMAL /HPF
STRESS TARGET HR: 124 BPM
T4 FREE SERPL-MCNC: 1.57 NG/DL (ref 0.93–1.7)
TOTAL RATE: 0 BREATHS/MINUTE
TRIGL SERPL-MCNC: 127 MG/DL (ref 0–150)
TROPONIN T SERPL-MCNC: <0.01 NG/ML (ref 0–0.03)
TROPONIN T SERPL-MCNC: <0.01 NG/ML (ref 0–0.03)
TSH SERPL DL<=0.005 MIU/L-ACNC: 8.96 UIU/ML (ref 0.27–4.2)
TSH SERPL DL<=0.05 MIU/L-ACNC: 14.74 UIU/ML (ref 0.27–4.2)
UROBILINOGEN UR QL STRIP: ABNORMAL
UROBILINOGEN UR QL: ABNORMAL
VANCOMYCIN SERPL-MCNC: 10.4 MCG/ML (ref 5–40)
VANCOMYCIN SERPL-MCNC: 15.3 MCG/ML (ref 5–40)
VANCOMYCIN TROUGH SERPL-MCNC: 15.9 MCG/ML (ref 5–20)
VENTILATOR MODE: ABNORMAL
VIT B12 SERPL-MCNC: 807 PG/ML (ref 211–946)
VLDLC SERPL CALC-MCNC: 23 MG/DL (ref 5–40)
WBC # BLD AUTO: 5.6 X10E3/UL (ref 3.4–10.8)
WBC # BLD AUTO: 6.01 10*3/MM3 (ref 3.4–10.8)
WBC # BLD AUTO: 6.48 10*3/MM3 (ref 3.4–10.8)
WBC # BLD AUTO: 7.37 10*3/MM3 (ref 3.4–10.8)
WBC # BLD AUTO: 7.65 10*3/MM3 (ref 3.4–10.8)
WBC # BLD AUTO: 7.91 10*3/MM3 (ref 3.4–10.8)
WBC # BLD AUTO: 8.39 10*3/MM3 (ref 3.4–10.8)
WBC # BLD AUTO: 8.44 10*3/MM3 (ref 3.4–10.8)
WBC UR QL AUTO: ABNORMAL /HPF
WHOLE BLOOD HOLD SPECIMEN: NORMAL
WHOLE BLOOD HOLD SPECIMEN: NORMAL

## 2021-01-01 PROCEDURE — 25010000003 PHYTONADIONE 10 MG/ML SOLUTION 1 ML AMPULE: Performed by: NURSE PRACTITIONER

## 2021-01-01 PROCEDURE — 99239 HOSP IP/OBS DSCHRG MGMT >30: CPT | Performed by: INTERNAL MEDICINE

## 2021-01-01 PROCEDURE — 85025 COMPLETE CBC W/AUTO DIFF WBC: CPT | Performed by: EMERGENCY MEDICINE

## 2021-01-01 PROCEDURE — 80307 DRUG TEST PRSMV CHEM ANLYZR: CPT | Performed by: EMERGENCY MEDICINE

## 2021-01-01 PROCEDURE — 82962 GLUCOSE BLOOD TEST: CPT

## 2021-01-01 PROCEDURE — 97162 PT EVAL MOD COMPLEX 30 MIN: CPT

## 2021-01-01 PROCEDURE — 71250 CT THORAX DX C-: CPT

## 2021-01-01 PROCEDURE — 99223 1ST HOSP IP/OBS HIGH 75: CPT | Performed by: NURSE PRACTITIONER

## 2021-01-01 PROCEDURE — 93306 TTE W/DOPPLER COMPLETE: CPT

## 2021-01-01 PROCEDURE — 99232 SBSQ HOSP IP/OBS MODERATE 35: CPT | Performed by: NURSE PRACTITIONER

## 2021-01-01 PROCEDURE — 97110 THERAPEUTIC EXERCISES: CPT

## 2021-01-01 PROCEDURE — 99233 SBSQ HOSP IP/OBS HIGH 50: CPT | Performed by: HOSPITALIST

## 2021-01-01 PROCEDURE — 83880 ASSAY OF NATRIURETIC PEPTIDE: CPT | Performed by: EMERGENCY MEDICINE

## 2021-01-01 PROCEDURE — 36600 WITHDRAWAL OF ARTERIAL BLOOD: CPT

## 2021-01-01 PROCEDURE — 25010000002 DIPHENHYDRAMINE PER 50 MG: Performed by: PHYSICIAN ASSISTANT

## 2021-01-01 PROCEDURE — 25010000002 ALBUMIN HUMAN 25% PER 50 ML: Performed by: INTERNAL MEDICINE

## 2021-01-01 PROCEDURE — 82140 ASSAY OF AMMONIA: CPT | Performed by: HOSPITALIST

## 2021-01-01 PROCEDURE — 83605 ASSAY OF LACTIC ACID: CPT | Performed by: INTERNAL MEDICINE

## 2021-01-01 PROCEDURE — 91300 HC SARSCOV02 VAC 30MCG/0.3ML IM: CPT | Performed by: INTERNAL MEDICINE

## 2021-01-01 PROCEDURE — 82140 ASSAY OF AMMONIA: CPT | Performed by: EMERGENCY MEDICINE

## 2021-01-01 PROCEDURE — 80048 BASIC METABOLIC PNL TOTAL CA: CPT | Performed by: INTERNAL MEDICINE

## 2021-01-01 PROCEDURE — 25010000002 MORPHINE PER 10 MG

## 2021-01-01 PROCEDURE — 84443 ASSAY THYROID STIM HORMONE: CPT | Performed by: EMERGENCY MEDICINE

## 2021-01-01 PROCEDURE — 83605 ASSAY OF LACTIC ACID: CPT | Performed by: NURSE PRACTITIONER

## 2021-01-01 PROCEDURE — 99223 1ST HOSP IP/OBS HIGH 75: CPT | Performed by: INTERNAL MEDICINE

## 2021-01-01 PROCEDURE — 85049 AUTOMATED PLATELET COUNT: CPT | Performed by: RADIOLOGY

## 2021-01-01 PROCEDURE — 93010 ELECTROCARDIOGRAM REPORT: CPT | Performed by: INTERNAL MEDICINE

## 2021-01-01 PROCEDURE — 87086 URINE CULTURE/COLONY COUNT: CPT | Performed by: EMERGENCY MEDICINE

## 2021-01-01 PROCEDURE — 80053 COMPREHEN METABOLIC PANEL: CPT | Performed by: HOSPITALIST

## 2021-01-01 PROCEDURE — 25010000002 CEFEPIME PER 500 MG

## 2021-01-01 PROCEDURE — 51798 US URINE CAPACITY MEASURE: CPT | Performed by: NURSE PRACTITIONER

## 2021-01-01 PROCEDURE — 85610 PROTHROMBIN TIME: CPT | Performed by: NURSE PRACTITIONER

## 2021-01-01 PROCEDURE — 99214 OFFICE O/P EST MOD 30 MIN: CPT | Performed by: INTERNAL MEDICINE

## 2021-01-01 PROCEDURE — 99284 EMERGENCY DEPT VISIT MOD MDM: CPT

## 2021-01-01 PROCEDURE — P9047 ALBUMIN (HUMAN), 25%, 50ML: HCPCS | Performed by: INTERNAL MEDICINE

## 2021-01-01 PROCEDURE — 0T9B70Z DRAINAGE OF BLADDER WITH DRAINAGE DEVICE, VIA NATURAL OR ARTIFICIAL OPENING: ICD-10-PCS | Performed by: HOSPITALIST

## 2021-01-01 PROCEDURE — 36415 COLL VENOUS BLD VENIPUNCTURE: CPT | Performed by: GENERAL PRACTICE

## 2021-01-01 PROCEDURE — 86140 C-REACTIVE PROTEIN: CPT | Performed by: EMERGENCY MEDICINE

## 2021-01-01 PROCEDURE — 85610 PROTHROMBIN TIME: CPT | Performed by: RADIOLOGY

## 2021-01-01 PROCEDURE — 87636 SARSCOV2 & INF A&B AMP PRB: CPT | Performed by: NURSE PRACTITIONER

## 2021-01-01 PROCEDURE — 85027 COMPLETE CBC AUTOMATED: CPT | Performed by: HOSPITALIST

## 2021-01-01 PROCEDURE — 82077 ASSAY SPEC XCP UR&BREATH IA: CPT | Performed by: EMERGENCY MEDICINE

## 2021-01-01 PROCEDURE — 82805 BLOOD GASES W/O2 SATURATION: CPT

## 2021-01-01 PROCEDURE — 25010000002 VANCOMYCIN PER 500 MG

## 2021-01-01 PROCEDURE — 36415 COLL VENOUS BLD VENIPUNCTURE: CPT

## 2021-01-01 PROCEDURE — 81001 URINALYSIS AUTO W/SCOPE: CPT | Performed by: EMERGENCY MEDICINE

## 2021-01-01 PROCEDURE — 83735 ASSAY OF MAGNESIUM: CPT | Performed by: EMERGENCY MEDICINE

## 2021-01-01 PROCEDURE — 87077 CULTURE AEROBIC IDENTIFY: CPT | Performed by: EMERGENCY MEDICINE

## 2021-01-01 PROCEDURE — 25010000002 MORPHINE PER 10 MG: Performed by: FAMILY MEDICINE

## 2021-01-01 PROCEDURE — 76700 US EXAM ABDOM COMPLETE: CPT

## 2021-01-01 PROCEDURE — 72170 X-RAY EXAM OF PELVIS: CPT | Performed by: RADIOLOGY

## 2021-01-01 PROCEDURE — 72170 X-RAY EXAM OF PELVIS: CPT

## 2021-01-01 PROCEDURE — 83690 ASSAY OF LIPASE: CPT | Performed by: EMERGENCY MEDICINE

## 2021-01-01 PROCEDURE — 93005 ELECTROCARDIOGRAM TRACING: CPT | Performed by: NURSE PRACTITIONER

## 2021-01-01 PROCEDURE — 84439 ASSAY OF FREE THYROXINE: CPT | Performed by: EMERGENCY MEDICINE

## 2021-01-01 PROCEDURE — 85027 COMPLETE CBC AUTOMATED: CPT | Performed by: INTERNAL MEDICINE

## 2021-01-01 PROCEDURE — 80053 COMPREHEN METABOLIC PANEL: CPT | Performed by: EMERGENCY MEDICINE

## 2021-01-01 PROCEDURE — 80143 DRUG ASSAY ACETAMINOPHEN: CPT | Performed by: EMERGENCY MEDICINE

## 2021-01-01 PROCEDURE — 25010000002 MORPHINE SULFATE (PF) 2 MG/ML SOLUTION: Performed by: UROLOGY

## 2021-01-01 PROCEDURE — 83050 HGB METHEMOGLOBIN QUAN: CPT

## 2021-01-01 PROCEDURE — 0002A: CPT | Performed by: INTERNAL MEDICINE

## 2021-01-01 PROCEDURE — 81003 URINALYSIS AUTO W/O SCOPE: CPT | Performed by: UROLOGY

## 2021-01-01 PROCEDURE — 25010000002 CEFTRIAXONE: Performed by: EMERGENCY MEDICINE

## 2021-01-01 PROCEDURE — 99214 OFFICE O/P EST MOD 30 MIN: CPT | Performed by: NURSE PRACTITIONER

## 2021-01-01 PROCEDURE — 0001A: CPT | Performed by: INTERNAL MEDICINE

## 2021-01-01 PROCEDURE — 99213 OFFICE O/P EST LOW 20 MIN: CPT | Performed by: NURSE PRACTITIONER

## 2021-01-01 PROCEDURE — 87186 SC STD MICRODIL/AGAR DIL: CPT | Performed by: EMERGENCY MEDICINE

## 2021-01-01 PROCEDURE — 76942 ECHO GUIDE FOR BIOPSY: CPT

## 2021-01-01 PROCEDURE — 80202 ASSAY OF VANCOMYCIN: CPT

## 2021-01-01 PROCEDURE — 25010000002 CEFEPIME PER 500 MG: Performed by: INTERNAL MEDICINE

## 2021-01-01 PROCEDURE — 83605 ASSAY OF LACTIC ACID: CPT | Performed by: EMERGENCY MEDICINE

## 2021-01-01 PROCEDURE — 94799 UNLISTED PULMONARY SVC/PX: CPT

## 2021-01-01 PROCEDURE — 82375 ASSAY CARBOXYHB QUANT: CPT

## 2021-01-01 PROCEDURE — 87636 SARSCOV2 & INF A&B AMP PRB: CPT | Performed by: EMERGENCY MEDICINE

## 2021-01-01 PROCEDURE — 76700 US EXAM ABDOM COMPLETE: CPT | Performed by: RADIOLOGY

## 2021-01-01 PROCEDURE — 99283 EMERGENCY DEPT VISIT LOW MDM: CPT

## 2021-01-01 PROCEDURE — 85610 PROTHROMBIN TIME: CPT | Performed by: EMERGENCY MEDICINE

## 2021-01-01 PROCEDURE — 84145 PROCALCITONIN (PCT): CPT | Performed by: EMERGENCY MEDICINE

## 2021-01-01 PROCEDURE — 25010000002 LORAZEPAM PER 2 MG: Performed by: FAMILY MEDICINE

## 2021-01-01 PROCEDURE — 93306 TTE W/DOPPLER COMPLETE: CPT | Performed by: INTERNAL MEDICINE

## 2021-01-01 PROCEDURE — 83036 HEMOGLOBIN GLYCOSYLATED A1C: CPT | Performed by: NURSE PRACTITIONER

## 2021-01-01 PROCEDURE — 76775 US EXAM ABDO BACK WALL LIM: CPT | Performed by: RADIOLOGY

## 2021-01-01 PROCEDURE — 93000 ELECTROCARDIOGRAM COMPLETE: CPT | Performed by: INTERNAL MEDICINE

## 2021-01-01 PROCEDURE — 84100 ASSAY OF PHOSPHORUS: CPT | Performed by: INTERNAL MEDICINE

## 2021-01-01 PROCEDURE — 49083 ABD PARACENTESIS W/IMAGING: CPT | Performed by: RADIOLOGY

## 2021-01-01 PROCEDURE — 80048 BASIC METABOLIC PNL TOTAL CA: CPT | Performed by: FAMILY MEDICINE

## 2021-01-01 PROCEDURE — 99214 OFFICE O/P EST MOD 30 MIN: CPT | Performed by: GENERAL PRACTICE

## 2021-01-01 PROCEDURE — 85730 THROMBOPLASTIN TIME PARTIAL: CPT | Performed by: RADIOLOGY

## 2021-01-01 PROCEDURE — 85025 COMPLETE CBC W/AUTO DIFF WBC: CPT | Performed by: FAMILY MEDICINE

## 2021-01-01 PROCEDURE — 0TJB8ZZ INSPECTION OF BLADDER, VIA NATURAL OR ARTIFICIAL OPENING ENDOSCOPIC: ICD-10-PCS | Performed by: HOSPITALIST

## 2021-01-01 PROCEDURE — 80053 COMPREHEN METABOLIC PANEL: CPT | Performed by: NURSE PRACTITIONER

## 2021-01-01 PROCEDURE — 25010000002 LORAZEPAM PER 2 MG: Performed by: INTERNAL MEDICINE

## 2021-01-01 PROCEDURE — 80179 DRUG ASSAY SALICYLATE: CPT | Performed by: EMERGENCY MEDICINE

## 2021-01-01 PROCEDURE — 63710000001 INSULIN LISPRO (HUMAN) PER 5 UNITS: Performed by: NURSE PRACTITIONER

## 2021-01-01 PROCEDURE — 99232 SBSQ HOSP IP/OBS MODERATE 35: CPT | Performed by: INTERNAL MEDICINE

## 2021-01-01 PROCEDURE — 87040 BLOOD CULTURE FOR BACTERIA: CPT | Performed by: EMERGENCY MEDICINE

## 2021-01-01 PROCEDURE — 85730 THROMBOPLASTIN TIME PARTIAL: CPT | Performed by: EMERGENCY MEDICINE

## 2021-01-01 PROCEDURE — 80048 BASIC METABOLIC PNL TOTAL CA: CPT | Performed by: HOSPITALIST

## 2021-01-01 PROCEDURE — 25010000002 VANCOMYCIN 10 G RECONSTITUTED SOLUTION

## 2021-01-01 PROCEDURE — 96365 THER/PROPH/DIAG IV INF INIT: CPT

## 2021-01-01 PROCEDURE — 84300 ASSAY OF URINE SODIUM: CPT | Performed by: INTERNAL MEDICINE

## 2021-01-01 PROCEDURE — 99232 SBSQ HOSP IP/OBS MODERATE 35: CPT | Performed by: HOSPITALIST

## 2021-01-01 PROCEDURE — 84484 ASSAY OF TROPONIN QUANT: CPT | Performed by: EMERGENCY MEDICINE

## 2021-01-01 PROCEDURE — 93005 ELECTROCARDIOGRAM TRACING: CPT | Performed by: INTERNAL MEDICINE

## 2021-01-01 PROCEDURE — 82140 ASSAY OF AMMONIA: CPT | Performed by: NURSE PRACTITIONER

## 2021-01-01 PROCEDURE — 85025 COMPLETE CBC W/AUTO DIFF WBC: CPT | Performed by: NURSE PRACTITIONER

## 2021-01-01 PROCEDURE — 92610 EVALUATE SWALLOWING FUNCTION: CPT

## 2021-01-01 PROCEDURE — 76775 US EXAM ABDO BACK WALL LIM: CPT

## 2021-01-01 PROCEDURE — 82550 ASSAY OF CK (CPK): CPT | Performed by: EMERGENCY MEDICINE

## 2021-01-01 PROCEDURE — 87076 CULTURE ANAEROBE IDENT EACH: CPT | Performed by: EMERGENCY MEDICINE

## 2021-01-01 PROCEDURE — 97166 OT EVAL MOD COMPLEX 45 MIN: CPT

## 2021-01-01 PROCEDURE — 85652 RBC SED RATE AUTOMATED: CPT | Performed by: EMERGENCY MEDICINE

## 2021-01-01 PROCEDURE — 99222 1ST HOSP IP/OBS MODERATE 55: CPT | Performed by: INTERNAL MEDICINE

## 2021-01-01 PROCEDURE — 82105 ALPHA-FETOPROTEIN SERUM: CPT | Performed by: NURSE PRACTITIONER

## 2021-01-01 PROCEDURE — 99239 HOSP IP/OBS DSCHRG MGMT >30: CPT | Performed by: NURSE PRACTITIONER

## 2021-01-01 PROCEDURE — 74176 CT ABD & PELVIS W/O CONTRAST: CPT

## 2021-01-01 PROCEDURE — 93005 ELECTROCARDIOGRAM TRACING: CPT | Performed by: EMERGENCY MEDICINE

## 2021-01-01 RX ORDER — MIDODRINE HYDROCHLORIDE 5 MG/1
5 TABLET ORAL
COMMUNITY
End: 2021-01-01 | Stop reason: HOSPADM

## 2021-01-01 RX ORDER — DOXAZOSIN MESYLATE 4 MG/1
4 TABLET ORAL NIGHTLY
Qty: 90 TABLET | Refills: 5 | Status: SHIPPED | OUTPATIENT
Start: 2021-01-01 | End: 2021-01-01 | Stop reason: HOSPADM

## 2021-01-01 RX ORDER — TAMSULOSIN HYDROCHLORIDE 0.4 MG/1
1 CAPSULE ORAL DAILY
Qty: 30 CAPSULE | Refills: 5 | Status: SHIPPED | OUTPATIENT
Start: 2021-01-01 | End: 2021-01-01 | Stop reason: HOSPADM

## 2021-01-01 RX ORDER — TAMSULOSIN HYDROCHLORIDE 0.4 MG/1
0.4 CAPSULE ORAL DAILY
Status: CANCELLED | OUTPATIENT
Start: 2021-01-01

## 2021-01-01 RX ORDER — TRIAMCINOLONE ACETONIDE 1 MG/G
CREAM TOPICAL 2 TIMES DAILY PRN
Start: 2021-01-01

## 2021-01-01 RX ORDER — ROSUVASTATIN CALCIUM 10 MG/1
10 TABLET, COATED ORAL NIGHTLY
Status: CANCELLED | OUTPATIENT
Start: 2021-01-01

## 2021-01-01 RX ORDER — HEPARIN SODIUM 5000 [USP'U]/ML
5000 INJECTION, SOLUTION INTRAVENOUS; SUBCUTANEOUS EVERY 12 HOURS SCHEDULED
Status: DISCONTINUED | OUTPATIENT
Start: 2021-01-01 | End: 2021-01-01

## 2021-01-01 RX ORDER — MIDODRINE HYDROCHLORIDE 2.5 MG/1
5 TABLET ORAL
Status: DISCONTINUED | OUTPATIENT
Start: 2021-01-01 | End: 2021-01-01

## 2021-01-01 RX ORDER — FINASTERIDE 5 MG/1
5 TABLET, FILM COATED ORAL DAILY
Qty: 30 TABLET | Refills: 11 | Status: SHIPPED | OUTPATIENT
Start: 2021-01-01 | End: 2021-01-01 | Stop reason: SDUPTHER

## 2021-01-01 RX ORDER — NITROGLYCERIN 0.4 MG/1
0.4 TABLET SUBLINGUAL
Status: DISCONTINUED | OUTPATIENT
Start: 2021-01-01 | End: 2021-01-01 | Stop reason: HOSPADM

## 2021-01-01 RX ORDER — LEVOTHYROXINE SODIUM 137 UG/1
137 TABLET ORAL DAILY
COMMUNITY

## 2021-01-01 RX ORDER — PROPAFENONE HYDROCHLORIDE 150 MG/1
225 TABLET, COATED ORAL EVERY 8 HOURS
Status: CANCELLED | OUTPATIENT
Start: 2021-01-01

## 2021-01-01 RX ORDER — ALBUMIN (HUMAN) 12.5 G/50ML
12.5 SOLUTION INTRAVENOUS
Status: DISCONTINUED | OUTPATIENT
Start: 2021-01-01 | End: 2021-01-01

## 2021-01-01 RX ORDER — SODIUM CHLORIDE 0.9 % (FLUSH) 0.9 %
3-10 SYRINGE (ML) INJECTION AS NEEDED
Status: DISCONTINUED | OUTPATIENT
Start: 2021-01-01 | End: 2021-01-01 | Stop reason: HOSPADM

## 2021-01-01 RX ORDER — FINASTERIDE 5 MG/1
5 TABLET, FILM COATED ORAL DAILY
Status: DISCONTINUED | OUTPATIENT
Start: 2021-01-01 | End: 2021-01-01 | Stop reason: HOSPADM

## 2021-01-01 RX ORDER — DEXTROSE MONOHYDRATE 25 G/50ML
25 INJECTION, SOLUTION INTRAVENOUS
Start: 2021-01-01 | End: 2021-01-01 | Stop reason: HOSPADM

## 2021-01-01 RX ORDER — DEXTROSE MONOHYDRATE 25 G/50ML
25 INJECTION, SOLUTION INTRAVENOUS
Status: DISCONTINUED | OUTPATIENT
Start: 2021-01-01 | End: 2021-01-01 | Stop reason: HOSPADM

## 2021-01-01 RX ORDER — HYDROCHLOROTHIAZIDE 12.5 MG/1
12.5 TABLET ORAL DAILY
COMMUNITY
End: 2021-01-01 | Stop reason: HOSPADM

## 2021-01-01 RX ORDER — FLUORIDE TOOTHPASTE
15 TOOTHPASTE DENTAL ONCE
Status: COMPLETED | OUTPATIENT
Start: 2021-01-01 | End: 2021-01-01

## 2021-01-01 RX ORDER — ROSUVASTATIN CALCIUM 20 MG/1
20 TABLET, COATED ORAL NIGHTLY
COMMUNITY
End: 2021-01-01 | Stop reason: HOSPADM

## 2021-01-01 RX ORDER — MORPHINE SULFATE 4 MG/ML
4 INJECTION, SOLUTION INTRAMUSCULAR; INTRAVENOUS
Status: DISCONTINUED | OUTPATIENT
Start: 2021-01-01 | End: 2021-01-01 | Stop reason: HOSPADM

## 2021-01-01 RX ORDER — MORPHINE SULFATE 2 MG/ML
2 INJECTION, SOLUTION INTRAMUSCULAR; INTRAVENOUS
Status: DISCONTINUED | OUTPATIENT
Start: 2021-01-01 | End: 2021-01-01 | Stop reason: HOSPADM

## 2021-01-01 RX ORDER — MIDODRINE HYDROCHLORIDE 10 MG/1
10 TABLET ORAL
Qty: 90 TABLET | Refills: 2 | Status: SHIPPED | OUTPATIENT
Start: 2021-01-01

## 2021-01-01 RX ORDER — FLUCONAZOLE 150 MG/1
150 TABLET ORAL DAILY
Qty: 3 TABLET | Refills: 1 | Status: SHIPPED | OUTPATIENT
Start: 2021-01-01 | End: 2021-01-01

## 2021-01-01 RX ORDER — FINASTERIDE 5 MG/1
5 TABLET, FILM COATED ORAL DAILY
Status: CANCELLED | OUTPATIENT
Start: 2021-01-01

## 2021-01-01 RX ORDER — MORPHINE SULFATE 2 MG/ML
2 INJECTION, SOLUTION INTRAMUSCULAR; INTRAVENOUS ONCE
Status: COMPLETED | OUTPATIENT
Start: 2021-01-01 | End: 2021-01-01

## 2021-01-01 RX ORDER — LEVOTHYROXINE SODIUM 0.05 MG/1
50 TABLET ORAL DAILY
Status: CANCELLED | OUTPATIENT
Start: 2021-01-01

## 2021-01-01 RX ORDER — SODIUM CHLORIDE 0.9 % (FLUSH) 0.9 %
3 SYRINGE (ML) INJECTION EVERY 12 HOURS SCHEDULED
Status: DISCONTINUED | OUTPATIENT
Start: 2021-01-01 | End: 2021-01-01 | Stop reason: HOSPADM

## 2021-01-01 RX ORDER — L.ACID,PARA/B.BIFIDUM/S.THERM 8B CELL
1 CAPSULE ORAL DAILY
Status: DISCONTINUED | OUTPATIENT
Start: 2021-01-01 | End: 2021-01-01 | Stop reason: HOSPADM

## 2021-01-01 RX ORDER — MORPHINE SULFATE 2 MG/ML
INJECTION, SOLUTION INTRAMUSCULAR; INTRAVENOUS
Status: COMPLETED
Start: 2021-01-01 | End: 2021-01-01

## 2021-01-01 RX ORDER — SODIUM CHLORIDE 0.9 % (FLUSH) 0.9 %
10 SYRINGE (ML) INJECTION AS NEEDED
Status: DISCONTINUED | OUTPATIENT
Start: 2021-01-01 | End: 2021-01-01 | Stop reason: HOSPADM

## 2021-01-01 RX ORDER — MIDODRINE HYDROCHLORIDE 10 MG/1
15 TABLET ORAL
Qty: 135 TABLET | Refills: 2 | Status: SHIPPED | OUTPATIENT
Start: 2021-01-01 | End: 2021-01-01

## 2021-01-01 RX ORDER — DOXAZOSIN MESYLATE 4 MG/1
4 TABLET ORAL NIGHTLY
Qty: 90 TABLET | Refills: 5 | Status: SHIPPED | OUTPATIENT
Start: 2021-01-01 | End: 2021-01-01 | Stop reason: SDUPTHER

## 2021-01-01 RX ORDER — FLUCONAZOLE 150 MG/1
150 TABLET ORAL ONCE
Qty: 5 TABLET | Refills: 0 | Status: SHIPPED | OUTPATIENT
Start: 2021-01-01 | End: 2021-01-01

## 2021-01-01 RX ORDER — BUMETANIDE 0.5 MG/1
0.5 TABLET ORAL DAILY
Status: DISCONTINUED | OUTPATIENT
Start: 2021-01-01 | End: 2021-01-01 | Stop reason: HOSPADM

## 2021-01-01 RX ORDER — ATROPA BELLADONNA AND OPIUM 16.2; 3 MG/1; MG/1
30 SUPPOSITORY RECTAL EVERY 6 HOURS
Status: DISCONTINUED | OUTPATIENT
Start: 2021-01-01 | End: 2021-01-01 | Stop reason: HOSPADM

## 2021-01-01 RX ORDER — LEVOTHYROXINE SODIUM 137 UG/1
137 TABLET ORAL
Status: DISCONTINUED | OUTPATIENT
Start: 2021-01-01 | End: 2021-01-01 | Stop reason: HOSPADM

## 2021-01-01 RX ORDER — HYDROXYZINE HYDROCHLORIDE 25 MG/1
25 TABLET, FILM COATED ORAL 3 TIMES DAILY PRN
Qty: 15 TABLET | Refills: 0 | Status: SHIPPED | OUTPATIENT
Start: 2021-01-01 | End: 2021-03-22

## 2021-01-01 RX ORDER — METFORMIN HYDROCHLORIDE 500 MG/1
2000 TABLET, EXTENDED RELEASE ORAL
COMMUNITY
End: 2021-01-01 | Stop reason: HOSPADM

## 2021-01-01 RX ORDER — VANCOMYCIN HYDROCHLORIDE 1 G/200ML
1000 INJECTION, SOLUTION INTRAVENOUS ONCE
Status: COMPLETED | OUTPATIENT
Start: 2021-01-01 | End: 2021-01-01

## 2021-01-01 RX ORDER — LIDOCAINE HYDROCHLORIDE 20 MG/ML
JELLY TOPICAL AS NEEDED
Start: 2021-01-01 | End: 2021-01-01 | Stop reason: HOSPADM

## 2021-01-01 RX ORDER — FINASTERIDE 5 MG/1
5 TABLET, FILM COATED ORAL DAILY
Qty: 30 TABLET | Refills: 5 | Status: SHIPPED | OUTPATIENT
Start: 2021-01-01 | End: 2021-01-01 | Stop reason: SDUPTHER

## 2021-01-01 RX ORDER — LORAZEPAM 2 MG/ML
0.5 INJECTION INTRAMUSCULAR EVERY 4 HOURS
Status: DISCONTINUED | OUTPATIENT
Start: 2021-01-01 | End: 2021-01-01 | Stop reason: HOSPADM

## 2021-01-01 RX ORDER — METOPROLOL SUCCINATE 50 MG/1
50 TABLET, EXTENDED RELEASE ORAL DAILY
Status: CANCELLED | OUTPATIENT
Start: 2021-01-01

## 2021-01-01 RX ORDER — ATROPA BELLADONNA AND OPIUM 16.2; 3 MG/1; MG/1
30 SUPPOSITORY RECTAL EVERY 8 HOURS
Status: DISCONTINUED | OUTPATIENT
Start: 2021-01-01 | End: 2021-01-01

## 2021-01-01 RX ORDER — PROPAFENONE HYDROCHLORIDE 150 MG/1
225 TABLET, COATED ORAL EVERY 8 HOURS SCHEDULED
Status: DISCONTINUED | OUTPATIENT
Start: 2021-01-01 | End: 2021-01-01 | Stop reason: HOSPADM

## 2021-01-01 RX ORDER — BUMETANIDE 0.5 MG/1
0.5 TABLET ORAL DAILY
Qty: 30 TABLET | Refills: 0 | Status: SHIPPED | OUTPATIENT
Start: 2021-01-01

## 2021-01-01 RX ORDER — TAMSULOSIN HYDROCHLORIDE 0.4 MG/1
1 CAPSULE ORAL DAILY
Qty: 30 CAPSULE | Refills: 5 | Status: SHIPPED | OUTPATIENT
Start: 2021-01-01 | End: 2021-01-01 | Stop reason: SDUPTHER

## 2021-01-01 RX ORDER — ALBUMIN (HUMAN) 12.5 G/50ML
25 SOLUTION INTRAVENOUS ONCE
Status: CANCELLED | OUTPATIENT
Start: 2021-01-01

## 2021-01-01 RX ORDER — TRIAMCINOLONE ACETONIDE 1 MG/G
CREAM TOPICAL 2 TIMES DAILY PRN
Status: CANCELLED | OUTPATIENT
Start: 2021-01-01

## 2021-01-01 RX ORDER — GLYCOPYRROLATE 0.2 MG/ML
0.4 INJECTION INTRAMUSCULAR; INTRAVENOUS
Status: DISCONTINUED | OUTPATIENT
Start: 2021-01-01 | End: 2021-01-01 | Stop reason: HOSPADM

## 2021-01-01 RX ORDER — MORPHINE SULFATE 20 MG/ML
5 SOLUTION ORAL EVERY 4 HOURS PRN
Qty: 30 ML | Refills: 0 | Status: SHIPPED | OUTPATIENT
Start: 2021-01-01 | End: 2021-04-07

## 2021-01-01 RX ORDER — ALBUMIN (HUMAN) 12.5 G/50ML
12.5 SOLUTION INTRAVENOUS ONCE
Status: COMPLETED | OUTPATIENT
Start: 2021-01-01 | End: 2021-01-01

## 2021-01-01 RX ORDER — LEVOTHYROXINE SODIUM 0.05 MG/1
50 TABLET ORAL DAILY
Status: ON HOLD | COMMUNITY
End: 2021-01-01

## 2021-01-01 RX ORDER — ROSUVASTATIN CALCIUM 20 MG/1
20 TABLET, COATED ORAL NIGHTLY
Status: CANCELLED | OUTPATIENT
Start: 2021-01-01

## 2021-01-01 RX ORDER — HYDROCHLOROTHIAZIDE 12.5 MG/1
12.5 TABLET ORAL EVERY MORNING
Qty: 5 TABLET | Refills: 0 | Status: ON HOLD | OUTPATIENT
Start: 2021-01-01 | End: 2021-01-01

## 2021-01-01 RX ORDER — LORAZEPAM 2 MG/ML
0.5 INJECTION INTRAMUSCULAR ONCE
Status: COMPLETED | OUTPATIENT
Start: 2021-01-01 | End: 2021-01-01

## 2021-01-01 RX ORDER — ALBUMIN (HUMAN) 12.5 G/50ML
12.5 SOLUTION INTRAVENOUS
Status: DISCONTINUED | OUTPATIENT
Start: 2021-01-01 | End: 2021-01-01 | Stop reason: HOSPADM

## 2021-01-01 RX ORDER — SODIUM BICARBONATE 650 MG/1
650 TABLET ORAL 2 TIMES DAILY
Status: DISCONTINUED | OUTPATIENT
Start: 2021-01-01 | End: 2021-01-01 | Stop reason: HOSPADM

## 2021-01-01 RX ORDER — NYSTATIN 100000 U/G
CREAM TOPICAL AS NEEDED
Qty: 30 G | Refills: 2 | Status: ON HOLD | OUTPATIENT
Start: 2021-01-01 | End: 2021-01-01

## 2021-01-01 RX ORDER — FLUTICASONE PROPIONATE 0.05 %
1 CREAM (GRAM) TOPICAL 2 TIMES DAILY PRN
COMMUNITY
End: 2021-01-01 | Stop reason: HOSPADM

## 2021-01-01 RX ORDER — ALBUMIN (HUMAN) 12.5 G/50ML
12.5 SOLUTION INTRAVENOUS
Qty: 300 ML | Refills: 0
Start: 2021-01-01 | End: 2021-01-01 | Stop reason: HOSPADM

## 2021-01-01 RX ORDER — MORPHINE SULFATE 2 MG/ML
6 INJECTION, SOLUTION INTRAMUSCULAR; INTRAVENOUS ONCE
Status: COMPLETED | OUTPATIENT
Start: 2021-01-01 | End: 2021-01-01

## 2021-01-01 RX ORDER — HYDROXYZINE HYDROCHLORIDE 25 MG/1
25 TABLET, FILM COATED ORAL ONCE AS NEEDED
Status: COMPLETED | OUTPATIENT
Start: 2021-01-01 | End: 2021-01-01

## 2021-01-01 RX ORDER — HYDROCHLOROTHIAZIDE 12.5 MG/1
12.5 TABLET ORAL EVERY MORNING
Qty: 30 TABLET | Refills: 5 | Status: SHIPPED | OUTPATIENT
Start: 2021-01-01 | End: 2021-01-01 | Stop reason: SDUPTHER

## 2021-01-01 RX ORDER — LACTULOSE 10 G/15ML
10 SOLUTION ORAL 2 TIMES DAILY
Qty: 473 ML | Refills: 2 | Status: SHIPPED | OUTPATIENT
Start: 2021-01-01

## 2021-01-01 RX ORDER — LIDOCAINE HYDROCHLORIDE 20 MG/ML
JELLY TOPICAL ONCE
Status: COMPLETED | OUTPATIENT
Start: 2021-01-01 | End: 2021-01-01

## 2021-01-01 RX ORDER — TRIAMCINOLONE ACETONIDE 1 MG/G
CREAM TOPICAL 2 TIMES DAILY PRN
Status: DISCONTINUED | OUTPATIENT
Start: 2021-01-01 | End: 2021-01-01 | Stop reason: HOSPADM

## 2021-01-01 RX ORDER — MIDAZOLAM HYDROCHLORIDE 1 MG/ML
4 INJECTION INTRAMUSCULAR; INTRAVENOUS ONCE
Status: DISCONTINUED | OUTPATIENT
Start: 2021-01-01 | End: 2021-01-01

## 2021-01-01 RX ORDER — ALBUMIN (HUMAN) 12.5 G/50ML
25 SOLUTION INTRAVENOUS ONCE
Status: COMPLETED | OUTPATIENT
Start: 2021-01-01 | End: 2021-01-01

## 2021-01-01 RX ORDER — SODIUM CHLORIDE 9 MG/ML
100 INJECTION, SOLUTION INTRAVENOUS CONTINUOUS
Status: DISCONTINUED | OUTPATIENT
Start: 2021-01-01 | End: 2021-01-01

## 2021-01-01 RX ORDER — ALBUMIN (HUMAN) 12.5 G/50ML
25 SOLUTION INTRAVENOUS
Status: DISCONTINUED | OUTPATIENT
Start: 2021-01-01 | End: 2021-01-01

## 2021-01-01 RX ORDER — ATROPA BELLADONNA AND OPIUM 16.2; 6 MG/1; MG/1
60 SUPPOSITORY RECTAL EVERY 8 HOURS PRN
Qty: 15 SUPPOSITORY | Refills: 0 | Status: SHIPPED | OUTPATIENT
Start: 2021-01-01 | End: 2021-03-26

## 2021-01-01 RX ORDER — NICOTINE POLACRILEX 4 MG
15 LOZENGE BUCCAL
Status: DISCONTINUED | OUTPATIENT
Start: 2021-01-01 | End: 2021-01-01 | Stop reason: HOSPADM

## 2021-01-01 RX ORDER — MIDODRINE HYDROCHLORIDE 10 MG/1
10 TABLET ORAL
Status: DISCONTINUED | OUTPATIENT
Start: 2021-01-01 | End: 2021-01-01

## 2021-01-01 RX ORDER — LORAZEPAM 0.5 MG/1
0.5 TABLET ORAL EVERY 8 HOURS PRN
Qty: 15 TABLET | Refills: 0 | Status: SHIPPED | OUTPATIENT
Start: 2021-01-01

## 2021-01-01 RX ORDER — PROPAFENONE HYDROCHLORIDE 225 MG/1
225 TABLET, FILM COATED ORAL EVERY 8 HOURS
Status: DISCONTINUED | OUTPATIENT
Start: 2021-01-01 | End: 2021-01-01 | Stop reason: HOSPADM

## 2021-01-01 RX ORDER — SODIUM CHLORIDE 9 MG/ML
125 INJECTION, SOLUTION INTRAVENOUS CONTINUOUS
Status: DISCONTINUED | OUTPATIENT
Start: 2021-01-01 | End: 2021-01-01

## 2021-01-01 RX ORDER — DIPHENHYDRAMINE HYDROCHLORIDE 50 MG/ML
12.5 INJECTION INTRAMUSCULAR; INTRAVENOUS ONCE
Status: COMPLETED | OUTPATIENT
Start: 2021-01-01 | End: 2021-01-01

## 2021-01-01 RX ORDER — MIDODRINE HYDROCHLORIDE 10 MG/1
10 TABLET ORAL
Start: 2021-01-01 | End: 2021-01-01 | Stop reason: HOSPADM

## 2021-01-01 RX ORDER — MIDODRINE HYDROCHLORIDE 2.5 MG/1
10 TABLET ORAL
Status: DISCONTINUED | OUTPATIENT
Start: 2021-01-01 | End: 2021-01-01 | Stop reason: HOSPADM

## 2021-01-01 RX ORDER — LIDOCAINE HYDROCHLORIDE 20 MG/ML
JELLY TOPICAL AS NEEDED
Status: DISCONTINUED | OUTPATIENT
Start: 2021-01-01 | End: 2021-01-01 | Stop reason: HOSPADM

## 2021-01-01 RX ORDER — TERAZOSIN 5 MG/1
5 CAPSULE ORAL NIGHTLY
Status: CANCELLED | OUTPATIENT
Start: 2021-01-01

## 2021-01-01 RX ORDER — NITROGLYCERIN 0.4 MG/1
0.4 TABLET SUBLINGUAL
Refills: 12
Start: 2021-01-01

## 2021-01-01 RX ORDER — MORPHINE SULFATE 20 MG/ML
5 SOLUTION ORAL EVERY 4 HOURS PRN
Status: DISCONTINUED | OUTPATIENT
Start: 2021-01-01 | End: 2021-01-01 | Stop reason: HOSPADM

## 2021-01-01 RX ORDER — LACTULOSE 10 G/15ML
10 SOLUTION ORAL 2 TIMES DAILY
Status: DISCONTINUED | OUTPATIENT
Start: 2021-01-01 | End: 2021-01-01 | Stop reason: HOSPADM

## 2021-01-01 RX ORDER — NICOTINE POLACRILEX 4 MG
15 LOZENGE BUCCAL
Start: 2021-01-01

## 2021-01-01 RX ORDER — HYDROXYZINE HYDROCHLORIDE 25 MG/1
25 TABLET, FILM COATED ORAL 3 TIMES DAILY PRN
Status: DISCONTINUED | OUTPATIENT
Start: 2021-01-01 | End: 2021-01-01 | Stop reason: HOSPADM

## 2021-01-01 RX ORDER — L.ACID,PARA/B.BIFIDUM/S.THERM 8B CELL
1 CAPSULE ORAL DAILY
Start: 2021-01-01

## 2021-01-01 RX ORDER — ATROPA BELLADONNA AND OPIUM 16.2; 6 MG/1; MG/1
60 SUPPOSITORY RECTAL EVERY 8 HOURS PRN
Status: DISCONTINUED | OUTPATIENT
Start: 2021-01-01 | End: 2021-01-01 | Stop reason: HOSPADM

## 2021-01-01 RX ORDER — ATROPA BELLADONNA AND OPIUM 16.2; 6 MG/1; MG/1
60 SUPPOSITORY RECTAL ONCE
Status: COMPLETED | OUTPATIENT
Start: 2021-01-01 | End: 2021-01-01

## 2021-01-01 RX ORDER — SODIUM CHLORIDE 0.9 % (FLUSH) 0.9 %
10 SYRINGE (ML) INJECTION EVERY 12 HOURS SCHEDULED
Status: DISCONTINUED | OUTPATIENT
Start: 2021-01-01 | End: 2021-01-01

## 2021-01-01 RX ORDER — METOPROLOL SUCCINATE 50 MG/1
50 TABLET, EXTENDED RELEASE ORAL DAILY
COMMUNITY
End: 2021-01-01 | Stop reason: HOSPADM

## 2021-01-01 RX ORDER — OXYCODONE HYDROCHLORIDE 5 MG/1
5 TABLET ORAL EVERY 4 HOURS PRN
Status: DISCONTINUED | OUTPATIENT
Start: 2021-01-01 | End: 2021-01-01

## 2021-01-01 RX ORDER — MORPHINE SULFATE 2 MG/ML
2 INJECTION, SOLUTION INTRAMUSCULAR; INTRAVENOUS
Start: 2021-01-01 | End: 2021-01-01 | Stop reason: HOSPADM

## 2021-01-01 RX ORDER — FINASTERIDE 5 MG/1
5 TABLET, FILM COATED ORAL DAILY
Qty: 30 TABLET | Refills: 11 | Status: SHIPPED | OUTPATIENT
Start: 2021-01-01

## 2021-01-01 RX ADMIN — SODIUM CHLORIDE, PRESERVATIVE FREE 10 ML: 5 INJECTION INTRAVENOUS at 09:25

## 2021-01-01 RX ADMIN — METRONIDAZOLE 500 MG: 500 INJECTION, SOLUTION INTRAVENOUS at 21:29

## 2021-01-01 RX ADMIN — CEFEPIME HYDROCHLORIDE 2 G: 2 INJECTION, POWDER, FOR SOLUTION INTRAVENOUS at 05:14

## 2021-01-01 RX ADMIN — MORPHINE SULFATE 4 MG: 4 INJECTION, SOLUTION INTRAMUSCULAR; INTRAVENOUS at 04:14

## 2021-01-01 RX ADMIN — GLYCOPYRROLATE 0.4 MG: 0.2 INJECTION INTRAMUSCULAR; INTRAVENOUS at 05:16

## 2021-01-01 RX ADMIN — LORAZEPAM 0.5 MG: 2 INJECTION INTRAMUSCULAR; INTRAVENOUS at 12:36

## 2021-01-01 RX ADMIN — APIXABAN 5 MG: 5 TABLET, FILM COATED ORAL at 08:31

## 2021-01-01 RX ADMIN — DIPHENHYDRAMINE HYDROCHLORIDE 12.5 MG: 50 INJECTION INTRAMUSCULAR; INTRAVENOUS at 21:46

## 2021-01-01 RX ADMIN — PROPAFENONE HYDROCHLORIDE 225 MG: 225 TABLET, FILM COATED ORAL at 01:11

## 2021-01-01 RX ADMIN — SODIUM CHLORIDE, PRESERVATIVE FREE 10 ML: 5 INJECTION INTRAVENOUS at 21:15

## 2021-01-01 RX ADMIN — Medication 1 CAPSULE: at 09:16

## 2021-01-01 RX ADMIN — PROPAFENONE HYDROCHLORIDE 225 MG: 150 TABLET, FILM COATED ORAL at 06:11

## 2021-01-01 RX ADMIN — MIDODRINE HYDROCHLORIDE 15 MG: 10 TABLET ORAL at 09:17

## 2021-01-01 RX ADMIN — MIDODRINE HYDROCHLORIDE 15 MG: 10 TABLET ORAL at 17:01

## 2021-01-01 RX ADMIN — GLYCOPYRROLATE 0.4 MG: 0.2 INJECTION INTRAMUSCULAR; INTRAVENOUS at 12:12

## 2021-01-01 RX ADMIN — LORAZEPAM 0.5 MG: 2 INJECTION INTRAMUSCULAR; INTRAVENOUS at 08:28

## 2021-01-01 RX ADMIN — BUMETANIDE 0.5 MG: 0.5 TABLET ORAL at 17:02

## 2021-01-01 RX ADMIN — ATROPA BELLADONNA AND OPIUM 30 MG: 16.2; 3 SUPPOSITORY RECTAL at 08:28

## 2021-01-01 RX ADMIN — CEFEPIME 2 G: 2 INJECTION, POWDER, FOR SOLUTION INTRAVENOUS at 04:29

## 2021-01-01 RX ADMIN — ATROPA BELLADONNA AND OPIUM 30 MG: 16.2; 3 SUPPOSITORY RECTAL at 15:06

## 2021-01-01 RX ADMIN — SODIUM CHLORIDE, PRESERVATIVE FREE 3 ML: 5 INJECTION INTRAVENOUS at 08:31

## 2021-01-01 RX ADMIN — ATROPA BELLADONNA AND OPIUM 60 MG: 16.2; 3 SUPPOSITORY RECTAL at 19:25

## 2021-01-01 RX ADMIN — MORPHINE SULFATE 4 MG: 4 INJECTION, SOLUTION INTRAMUSCULAR; INTRAVENOUS at 18:56

## 2021-01-01 RX ADMIN — Medication 15 ML: at 14:09

## 2021-01-01 RX ADMIN — RIFAXIMIN 550 MG: 550 TABLET ORAL at 08:43

## 2021-01-01 RX ADMIN — ATROPA BELLADONNA AND OPIUM 30 MG: 16.2; 3 SUPPOSITORY RECTAL at 17:02

## 2021-01-01 RX ADMIN — SODIUM CHLORIDE 500 ML: 9 INJECTION, SOLUTION INTRAVENOUS at 15:12

## 2021-01-01 RX ADMIN — PROPAFENONE HYDROCHLORIDE 225 MG: 225 TABLET, FILM COATED ORAL at 17:17

## 2021-01-01 RX ADMIN — FINASTERIDE 5 MG: 5 TABLET, FILM COATED ORAL at 08:31

## 2021-01-01 RX ADMIN — MORPHINE SULFATE 5 MG: 20 SOLUTION ORAL at 13:37

## 2021-01-01 RX ADMIN — PROPAFENONE HYDROCHLORIDE 225 MG: 225 TABLET, FILM COATED ORAL at 10:38

## 2021-01-01 RX ADMIN — PROPAFENONE HYDROCHLORIDE 225 MG: 150 TABLET, FILM COATED ORAL at 13:14

## 2021-01-01 RX ADMIN — ALBUMIN HUMAN 12.5 G: 0.25 SOLUTION INTRAVENOUS at 12:40

## 2021-01-01 RX ADMIN — ATROPA BELLADONNA AND OPIUM 30 MG: 16.2; 3 SUPPOSITORY RECTAL at 08:43

## 2021-01-01 RX ADMIN — SODIUM CHLORIDE 500 ML: 9 INJECTION, SOLUTION INTRAVENOUS at 03:49

## 2021-01-01 RX ADMIN — GLYCOPYRROLATE 0.4 MG: 0.2 INJECTION INTRAMUSCULAR; INTRAVENOUS at 11:49

## 2021-01-01 RX ADMIN — MIDODRINE HYDROCHLORIDE 15 MG: 10 TABLET ORAL at 17:17

## 2021-01-01 RX ADMIN — PROPAFENONE HYDROCHLORIDE 225 MG: 225 TABLET, FILM COATED ORAL at 23:26

## 2021-01-01 RX ADMIN — ALBUMIN HUMAN 12.5 G: 0.25 SOLUTION INTRAVENOUS at 17:26

## 2021-01-01 RX ADMIN — METRONIDAZOLE 500 MG: 500 INJECTION, SOLUTION INTRAVENOUS at 13:14

## 2021-01-01 RX ADMIN — CEFTRIAXONE 2 G: 2 INJECTION, POWDER, FOR SOLUTION INTRAMUSCULAR; INTRAVENOUS at 00:36

## 2021-01-01 RX ADMIN — MIDODRINE HYDROCHLORIDE 15 MG: 10 TABLET ORAL at 10:37

## 2021-01-01 RX ADMIN — PROPAFENONE HYDROCHLORIDE 225 MG: 225 TABLET, FILM COATED ORAL at 08:47

## 2021-01-01 RX ADMIN — RIFAXIMIN 550 MG: 550 TABLET ORAL at 10:38

## 2021-01-01 RX ADMIN — LORAZEPAM 0.5 MG: 2 INJECTION INTRAMUSCULAR; INTRAVENOUS at 00:34

## 2021-01-01 RX ADMIN — LORAZEPAM 0.5 MG: 2 INJECTION INTRAMUSCULAR; INTRAVENOUS at 03:57

## 2021-01-01 RX ADMIN — ATROPA BELLADONNA AND OPIUM 60 MG: 16.2; 6 SUPPOSITORY RECTAL at 03:45

## 2021-01-01 RX ADMIN — METRONIDAZOLE 500 MG: 500 INJECTION, SOLUTION INTRAVENOUS at 13:10

## 2021-01-01 RX ADMIN — Medication 1 CAPSULE: at 08:31

## 2021-01-01 RX ADMIN — FINASTERIDE 5 MG: 5 TABLET, FILM COATED ORAL at 09:23

## 2021-01-01 RX ADMIN — FINASTERIDE 5 MG: 5 TABLET, FILM COATED ORAL at 13:10

## 2021-01-01 RX ADMIN — METRONIDAZOLE 500 MG: 500 INJECTION, SOLUTION INTRAVENOUS at 04:29

## 2021-01-01 RX ADMIN — GLYCOPYRROLATE 0.4 MG: 0.2 INJECTION INTRAMUSCULAR; INTRAVENOUS at 17:02

## 2021-01-01 RX ADMIN — ATROPA BELLADONNA AND OPIUM 30 MG: 16.2; 3 SUPPOSITORY RECTAL at 03:57

## 2021-01-01 RX ADMIN — MORPHINE SULFATE 2 MG: 2 INJECTION, SOLUTION INTRAMUSCULAR; INTRAVENOUS at 00:52

## 2021-01-01 RX ADMIN — VANCOMYCIN HYDROCHLORIDE 750 MG: 750 INJECTION, SOLUTION INTRAVENOUS at 10:51

## 2021-01-01 RX ADMIN — GLYCOPYRROLATE 0.4 MG: 0.2 INJECTION INTRAMUSCULAR; INTRAVENOUS at 03:57

## 2021-01-01 RX ADMIN — ALBUMIN HUMAN 25 G: 0.25 SOLUTION INTRAVENOUS at 12:16

## 2021-01-01 RX ADMIN — VANCOMYCIN HYDROCHLORIDE 1000 MG: 1 INJECTION, SOLUTION INTRAVENOUS at 12:17

## 2021-01-01 RX ADMIN — CARBIDOPA AND LEVODOPA 10 MG: 50; 200 TABLET, EXTENDED RELEASE ORAL at 11:06

## 2021-01-01 RX ADMIN — OXYCODONE 5 MG: 5 TABLET ORAL at 14:12

## 2021-01-01 RX ADMIN — CEFEPIME HYDROCHLORIDE 2 G: 2 INJECTION, POWDER, FOR SOLUTION INTRAVENOUS at 06:16

## 2021-01-01 RX ADMIN — LACTULOSE 10 G: 20 SOLUTION ORAL at 09:24

## 2021-01-01 RX ADMIN — ATROPA BELLADONNA AND OPIUM 60 MG: 16.2; 3 SUPPOSITORY RECTAL at 13:44

## 2021-01-01 RX ADMIN — MORPHINE SULFATE 2 MG: 2 INJECTION, SOLUTION INTRAMUSCULAR; INTRAVENOUS at 20:46

## 2021-01-01 RX ADMIN — HYDROXYZINE HYDROCHLORIDE 25 MG: 25 TABLET, FILM COATED ORAL at 03:04

## 2021-01-01 RX ADMIN — Medication 1 CAPSULE: at 17:01

## 2021-01-01 RX ADMIN — ALBUMIN HUMAN 25 G: 0.25 SOLUTION INTRAVENOUS at 17:14

## 2021-01-01 RX ADMIN — SODIUM CHLORIDE 100 ML/HR: 9 INJECTION, SOLUTION INTRAVENOUS at 04:29

## 2021-01-01 RX ADMIN — ATROPA BELLADONNA AND OPIUM 60 MG: 16.2; 3 SUPPOSITORY RECTAL at 13:34

## 2021-01-01 RX ADMIN — CARBIDOPA AND LEVODOPA 10 MG: 50; 200 TABLET, EXTENDED RELEASE ORAL at 08:35

## 2021-01-01 RX ADMIN — MORPHINE SULFATE 4 MG: 4 INJECTION, SOLUTION INTRAMUSCULAR; INTRAVENOUS at 15:06

## 2021-01-01 RX ADMIN — LACTULOSE 10 G: 20 SOLUTION ORAL at 10:37

## 2021-01-01 RX ADMIN — FINASTERIDE 5 MG: 5 TABLET, FILM COATED ORAL at 10:13

## 2021-01-01 RX ADMIN — LORAZEPAM 0.5 MG: 2 INJECTION INTRAMUSCULAR; INTRAVENOUS at 23:30

## 2021-01-01 RX ADMIN — CARBIDOPA AND LEVODOPA 10 MG: 50; 200 TABLET, EXTENDED RELEASE ORAL at 17:26

## 2021-01-01 RX ADMIN — RIFAXIMIN 550 MG: 550 TABLET ORAL at 20:08

## 2021-01-01 RX ADMIN — FINASTERIDE 5 MG: 5 TABLET, FILM COATED ORAL at 10:39

## 2021-01-01 RX ADMIN — RIFAXIMIN 550 MG: 550 TABLET ORAL at 09:24

## 2021-01-01 RX ADMIN — FINASTERIDE 5 MG: 5 TABLET, FILM COATED ORAL at 09:17

## 2021-01-01 RX ADMIN — MIDODRINE HYDROCHLORIDE 15 MG: 10 TABLET ORAL at 16:26

## 2021-01-01 RX ADMIN — SODIUM CHLORIDE 1000 ML: 9 INJECTION, SOLUTION INTRAVENOUS at 21:28

## 2021-01-01 RX ADMIN — PROPAFENONE HYDROCHLORIDE 225 MG: 225 TABLET, FILM COATED ORAL at 23:19

## 2021-01-01 RX ADMIN — RIFAXIMIN 550 MG: 550 TABLET ORAL at 09:17

## 2021-01-01 RX ADMIN — PROPAFENONE HYDROCHLORIDE 225 MG: 150 TABLET, FILM COATED ORAL at 21:28

## 2021-01-01 RX ADMIN — MIDODRINE HYDROCHLORIDE 15 MG: 10 TABLET ORAL at 17:15

## 2021-01-01 RX ADMIN — MORPHINE SULFATE 4 MG: 4 INJECTION, SOLUTION INTRAMUSCULAR; INTRAVENOUS at 10:15

## 2021-01-01 RX ADMIN — GLYCOPYRROLATE 0.4 MG: 0.2 INJECTION INTRAMUSCULAR; INTRAVENOUS at 20:13

## 2021-01-01 RX ADMIN — GLYCOPYRROLATE 0.4 MG: 0.2 INJECTION INTRAMUSCULAR; INTRAVENOUS at 23:20

## 2021-01-01 RX ADMIN — CEFEPIME HYDROCHLORIDE 2 G: 2 INJECTION, POWDER, FOR SOLUTION INTRAVENOUS at 17:58

## 2021-01-01 RX ADMIN — PROPAFENONE HYDROCHLORIDE 225 MG: 150 TABLET, FILM COATED ORAL at 22:14

## 2021-01-01 RX ADMIN — MIDODRINE HYDROCHLORIDE 15 MG: 10 TABLET ORAL at 12:17

## 2021-01-01 RX ADMIN — GLYCOPYRROLATE 0.4 MG: 0.2 INJECTION INTRAMUSCULAR; INTRAVENOUS at 15:05

## 2021-01-01 RX ADMIN — RIFAXIMIN 550 MG: 550 TABLET ORAL at 20:18

## 2021-01-01 RX ADMIN — MORPHINE SULFATE 6 MG: 10 INJECTION INTRAVENOUS at 03:44

## 2021-01-01 RX ADMIN — PROPAFENONE HYDROCHLORIDE 225 MG: 225 TABLET, FILM COATED ORAL at 10:03

## 2021-01-01 RX ADMIN — ATROPA BELLADONNA AND OPIUM 30 MG: 16.2; 3 SUPPOSITORY RECTAL at 23:19

## 2021-01-01 RX ADMIN — PROPAFENONE HYDROCHLORIDE 225 MG: 150 TABLET, FILM COATED ORAL at 13:10

## 2021-01-01 RX ADMIN — MIDODRINE HYDROCHLORIDE 15 MG: 10 TABLET ORAL at 11:49

## 2021-01-01 RX ADMIN — VANCOMYCIN HYDROCHLORIDE 750 MG: 750 INJECTION, SOLUTION INTRAVENOUS at 10:36

## 2021-01-01 RX ADMIN — GLYCOPYRROLATE 0.4 MG: 0.2 INJECTION INTRAMUSCULAR; INTRAVENOUS at 23:30

## 2021-01-01 RX ADMIN — METRONIDAZOLE 500 MG: 500 INJECTION, SOLUTION INTRAVENOUS at 22:13

## 2021-01-01 RX ADMIN — INSULIN LISPRO 3 UNITS: 100 INJECTION, SOLUTION INTRAVENOUS; SUBCUTANEOUS at 09:22

## 2021-01-01 RX ADMIN — VANCOMYCIN HYDROCHLORIDE 2000 MG: 100 INJECTION, POWDER, LYOPHILIZED, FOR SOLUTION INTRAVENOUS at 04:57

## 2021-01-01 RX ADMIN — PHYTONADIONE 10 MG: 10 INJECTION, EMULSION INTRAMUSCULAR; INTRAVENOUS; SUBCUTANEOUS at 19:49

## 2021-01-01 RX ADMIN — MIDODRINE HYDROCHLORIDE 10 MG: 10 TABLET ORAL at 10:04

## 2021-01-01 RX ADMIN — LORAZEPAM 0.5 MG: 2 INJECTION INTRAMUSCULAR; INTRAVENOUS at 15:05

## 2021-01-01 RX ADMIN — SODIUM CHLORIDE, PRESERVATIVE FREE 10 ML: 5 INJECTION INTRAVENOUS at 20:19

## 2021-01-01 RX ADMIN — LEVOTHYROXINE SODIUM 137 MCG: 0.14 TABLET ORAL at 05:14

## 2021-01-01 RX ADMIN — ALBUMIN HUMAN 12.5 G: 0.25 SOLUTION INTRAVENOUS at 09:07

## 2021-01-01 RX ADMIN — CEFEPIME HYDROCHLORIDE 2 G: 2 INJECTION, POWDER, FOR SOLUTION INTRAVENOUS at 06:33

## 2021-01-01 RX ADMIN — PROPAFENONE HYDROCHLORIDE 225 MG: 225 TABLET, FILM COATED ORAL at 09:16

## 2021-01-01 RX ADMIN — LEVOTHYROXINE SODIUM 137 MCG: 0.14 TABLET ORAL at 05:16

## 2021-01-01 RX ADMIN — LACTULOSE 10 G: 20 SOLUTION ORAL at 09:17

## 2021-01-01 RX ADMIN — ALBUMIN HUMAN 25 G: 0.25 SOLUTION INTRAVENOUS at 09:16

## 2021-01-01 RX ADMIN — SODIUM CHLORIDE, PRESERVATIVE FREE 10 ML: 5 INJECTION INTRAVENOUS at 10:39

## 2021-01-01 RX ADMIN — CARBIDOPA AND LEVODOPA 5 MG: 50; 200 TABLET, EXTENDED RELEASE ORAL at 17:01

## 2021-01-01 RX ADMIN — GLYCOPYRROLATE 0.4 MG: 0.2 INJECTION INTRAMUSCULAR; INTRAVENOUS at 08:28

## 2021-01-01 RX ADMIN — APIXABAN 5 MG: 5 TABLET, FILM COATED ORAL at 09:07

## 2021-01-01 RX ADMIN — CARBIDOPA AND LEVODOPA 10 MG: 50; 200 TABLET, EXTENDED RELEASE ORAL at 02:30

## 2021-01-01 RX ADMIN — SODIUM CHLORIDE, PRESERVATIVE FREE 3 ML: 5 INJECTION INTRAVENOUS at 22:14

## 2021-01-01 RX ADMIN — ATROPA BELLADONNA AND OPIUM 60 MG: 16.2; 3 SUPPOSITORY RECTAL at 21:15

## 2021-01-01 RX ADMIN — LEVOTHYROXINE SODIUM 137 MCG: 0.14 TABLET ORAL at 05:05

## 2021-01-01 RX ADMIN — APIXABAN 5 MG: 5 TABLET, FILM COATED ORAL at 22:14

## 2021-01-01 RX ADMIN — LEVOTHYROXINE SODIUM 137 MCG: 0.14 TABLET ORAL at 06:33

## 2021-01-01 RX ADMIN — ALBUMIN HUMAN 12.5 G: 0.25 SOLUTION INTRAVENOUS at 12:22

## 2021-01-01 RX ADMIN — CARBIDOPA AND LEVODOPA 5 MG: 50; 200 TABLET, EXTENDED RELEASE ORAL at 12:22

## 2021-01-01 RX ADMIN — FINASTERIDE 5 MG: 5 TABLET, FILM COATED ORAL at 08:44

## 2021-01-01 RX ADMIN — SODIUM CHLORIDE, PRESERVATIVE FREE 3 ML: 5 INJECTION INTRAVENOUS at 09:07

## 2021-01-01 RX ADMIN — ALBUMIN HUMAN 12.5 G: 0.25 SOLUTION INTRAVENOUS at 16:38

## 2021-01-01 RX ADMIN — LEVOTHYROXINE SODIUM 137.5 MCG: 125 TABLET ORAL at 13:10

## 2021-01-01 RX ADMIN — ATROPA BELLADONNA AND OPIUM 60 MG: 16.2; 3 SUPPOSITORY RECTAL at 12:17

## 2021-01-01 RX ADMIN — SODIUM CHLORIDE, PRESERVATIVE FREE 10 ML: 5 INJECTION INTRAVENOUS at 20:01

## 2021-01-01 RX ADMIN — GLYCOPYRROLATE 0.4 MG: 0.2 INJECTION INTRAMUSCULAR; INTRAVENOUS at 20:18

## 2021-01-01 RX ADMIN — Medication 1 CAPSULE: at 10:37

## 2021-01-01 RX ADMIN — ATROPA BELLADONNA AND OPIUM 30 MG: 16.2; 3 SUPPOSITORY RECTAL at 20:14

## 2021-01-01 RX ADMIN — GLYCOPYRROLATE 0.4 MG: 0.2 INJECTION INTRAMUSCULAR; INTRAVENOUS at 08:43

## 2021-01-01 RX ADMIN — PROPAFENONE HYDROCHLORIDE 225 MG: 225 TABLET, FILM COATED ORAL at 17:15

## 2021-01-01 RX ADMIN — LIDOCAINE HYDROCHLORIDE: 20 JELLY TOPICAL at 03:43

## 2021-01-01 RX ADMIN — Medication 1 CAPSULE: at 09:24

## 2021-01-01 RX ADMIN — SODIUM CHLORIDE 1000 ML: 9 INJECTION, SOLUTION INTRAVENOUS at 00:58

## 2021-01-01 RX ADMIN — GLYCOPYRROLATE 0.4 MG: 0.2 INJECTION INTRAMUSCULAR; INTRAVENOUS at 12:36

## 2021-01-01 RX ADMIN — HYDROXYZINE HYDROCHLORIDE 25 MG: 25 TABLET, FILM COATED ORAL at 20:08

## 2021-01-01 RX ADMIN — PROPAFENONE HYDROCHLORIDE 225 MG: 225 TABLET, FILM COATED ORAL at 16:26

## 2021-01-01 RX ADMIN — LACTULOSE 10 G: 20 SOLUTION ORAL at 08:47

## 2021-01-01 RX ADMIN — SODIUM CHLORIDE 100 ML/HR: 9 INJECTION, SOLUTION INTRAVENOUS at 21:29

## 2021-01-01 RX ADMIN — LACTULOSE 10 G: 20 SOLUTION ORAL at 20:18

## 2021-01-01 RX ADMIN — HYDROXYZINE HYDROCHLORIDE 25 MG: 25 TABLET, FILM COATED ORAL at 22:55

## 2021-01-01 RX ADMIN — VANCOMYCIN HYDROCHLORIDE 750 MG: 750 INJECTION, SOLUTION INTRAVENOUS at 10:15

## 2021-01-01 RX ADMIN — SODIUM CHLORIDE, PRESERVATIVE FREE 10 ML: 5 INJECTION INTRAVENOUS at 08:28

## 2021-01-01 RX ADMIN — SODIUM CHLORIDE, PRESERVATIVE FREE 3 ML: 5 INJECTION INTRAVENOUS at 21:29

## 2021-01-01 RX ADMIN — GLYCOPYRROLATE 0.4 MG: 0.2 INJECTION INTRAMUSCULAR; INTRAVENOUS at 17:32

## 2021-01-01 RX ADMIN — Medication 1 CAPSULE: at 08:42

## 2021-01-01 RX ADMIN — MIDODRINE HYDROCHLORIDE 15 MG: 10 TABLET ORAL at 08:42

## 2021-01-01 RX ADMIN — Medication 1 CAPSULE: at 10:04

## 2021-01-01 RX ADMIN — SODIUM CHLORIDE, PRESERVATIVE FREE 10 ML: 5 INJECTION INTRAVENOUS at 20:08

## 2021-01-01 RX ADMIN — ATROPA BELLADONNA AND OPIUM 60 MG: 16.2; 3 SUPPOSITORY RECTAL at 13:49

## 2021-01-01 RX ADMIN — DEXTROSE MONOHYDRATE 25 G: 25 INJECTION, SOLUTION INTRAVENOUS at 21:15

## 2021-01-01 RX ADMIN — LEVOTHYROXINE SODIUM 137.5 MCG: 125 TABLET ORAL at 06:02

## 2021-01-01 RX ADMIN — METRONIDAZOLE 500 MG: 500 INJECTION, SOLUTION INTRAVENOUS at 05:58

## 2021-01-01 RX ADMIN — VANCOMYCIN HYDROCHLORIDE 750 MG: 750 INJECTION, SOLUTION INTRAVENOUS at 11:28

## 2021-01-01 RX ADMIN — ALBUMIN HUMAN 12.5 G: 0.25 SOLUTION INTRAVENOUS at 11:06

## 2021-01-01 RX ADMIN — PROPAFENONE HYDROCHLORIDE 225 MG: 225 TABLET, FILM COATED ORAL at 17:03

## 2021-01-01 RX ADMIN — LORAZEPAM 0.5 MG: 2 INJECTION INTRAMUSCULAR; INTRAVENOUS at 19:05

## 2021-01-01 RX ADMIN — ALBUMIN HUMAN 25 G: 0.25 SOLUTION INTRAVENOUS at 09:31

## 2021-01-01 RX ADMIN — ALBUMIN HUMAN 25 G: 0.25 SOLUTION INTRAVENOUS at 10:03

## 2021-01-01 RX ADMIN — ALBUMIN HUMAN 12.5 G: 0.25 SOLUTION INTRAVENOUS at 17:01

## 2021-01-01 RX ADMIN — SODIUM CHLORIDE 150 ML/HR: 9 INJECTION, SOLUTION INTRAVENOUS at 06:18

## 2021-01-01 RX ADMIN — APIXABAN 5 MG: 5 TABLET, FILM COATED ORAL at 21:28

## 2021-01-01 RX ADMIN — INSULIN LISPRO 2 UNITS: 100 INJECTION, SOLUTION INTRAVENOUS; SUBCUTANEOUS at 12:12

## 2021-01-01 RX ADMIN — CEFEPIME 2 G: 2 INJECTION, POWDER, FOR SOLUTION INTRAVENOUS at 05:58

## 2021-01-01 RX ADMIN — INSULIN LISPRO 2 UNITS: 100 INJECTION, SOLUTION INTRAVENOUS; SUBCUTANEOUS at 10:12

## 2021-01-01 RX ADMIN — ATROPA BELLADONNA AND OPIUM 60 MG: 16.2; 3 SUPPOSITORY RECTAL at 01:20

## 2021-01-01 RX ADMIN — ALBUMIN HUMAN 12.5 G: 0.25 SOLUTION INTRAVENOUS at 08:31

## 2021-01-05 NOTE — PROGRESS NOTES
[facility] CARDIOLOGY  [address] [facility] [address] [place] [address]  Phone: [phone]  Fax: [phone]    01/05/2021    Chief Complaint   Patient presents with   • Atrial Fibrillation   • Hyperlipidemia   • Hypertension   • Diabetes        History:   Aaron Delgado is a 75 y.o. male seen in followup,  He denies CP or dyspnea. No palpitations.   Started getting dizziness and with frequent falls with unsteady gate. Associated symptom is shortness of breath. It has progressed over the last month.  The chart and medications were reviewed today. Problems above addressed this visit.      Past Medical History:   Diagnosis Date   • Arthritis    • CHF (congestive heart failure) (CMS/HCC)    • Coronary artery disease    • Diabetes mellitus (CMS/HCC)    • GERD (gastroesophageal reflux disease)    • Hypothyroidism due to Hashimoto's thyroiditis 9/25/2020   • Non-alcoholic cirrhosis (CMS/HCC)        Past Surgical History:   Procedure Laterality Date   • CHOLECYSTECTOMY     • COLONOSCOPY  01/2020    Dr. Fine- tubular adenomatous colon polyps x2   • CORONARY ARTERY BYPASS GRAFT     • ENDOSCOPY     • ENDOSCOPY N/A 8/17/2020    Procedure: ESOPHAGOGASTRODUODENOSCOPY WITH BIOPSY CPT CODE: 14674;  Surgeon: Andrew Barnett MD;  Location: [facility];  Service: Gastroenterology;  Laterality: N/A;        Past Social History:  Social History     Socioeconomic History   • Marital status:      Spouse name: Not on file   • Number of children: Not on file   • Years of education: Not on file   • Highest education level: Not on file   Occupational History   • Occupation: Retired   Social Needs   • Financial resource strain: Patient refused   • Food insecurity     Worry: Patient refused     Inability: Patient refused   • Transportation needs     Medical: Patient refused     Non-medical: Patient refused   Tobacco Use   • Smoking status: Never Smoker   • Smokeless tobacco: Never Used   Substance and  Sexual Activity   • Alcohol use: No     Frequency: Never   • Drug use: No   • Sexual activity: Defer   Lifestyle   • Physical activity     Days per week: Patient refused     Minutes per session: Patient refused   • Stress: Patient refused   Relationships   • Social connections     Talks on phone: Patient refused     Gets together: Patient refused     Attends Muslim service: Patient refused     Active member of club or organization: Patient refused     Attends meetings of clubs or organizations: Patient refused     Relationship status: Patient refused       Past Family History:  History reviewed. No pertinent family history.    Review of Systems:   Please see HPI  Constitution: No chills, no rigors, no unexplained weight loss or weight gain  Eyes:  No diplopia, no blurred vision, no loss of vision, conjunctiva is pink and sclera is anicteric  ENT:  No tinnitus, no otorrhea, no epistaxis, no sore throat   Respiratory: No cough, no hemoptysis  Cardiovascular: see HPI  Gastrointestinal: No nausea, no vomiting, no hematemesis, no diarrhea or constipation, no melena  Genitourinary: No frequency of dysuria no hematuria  Integument: No pruritis and  no skin rash  Hematologic / Lymphatic: No excessive bleeding, easy bruising, fatigue, lymphadenopathy and petechiae  Musculoskeletal: No joint pain, joint stiffness, joint swelling, muscle pain, muscle weakness and neck pain  Neurological: No dizziness, headaches, light headedness, seizures and vertigo  Endocrine: No frequent urination and nocturia, temperature intolerance, weight gain, unintended and weight loss, unintended      Current Outpatient Medications   Medication Sig Dispense Refill   • amiodarone (PACERONE) 200 MG tablet Take 1 tablet by mouth Daily. 90 tablet 3   • amLODIPine (NORVASC) 10 MG tablet Take 1 tablet by mouth Every Evening. 30 tablet 5   • benazepril (LOTENSIN) 40 MG tablet TAKE 1 TABLET DAILY (WILL REPLACE THE 20 MG DOSE) 90 tablet 3   • ELIQUIS 5 MG  tablet tablet TAKE 1 TABLET EVERY 12 HOURS 180 tablet 3   • fluticasone (CUTIVATE) 0.05 % cream Apply  topically to the appropriate area as directed 2 (Two) Times a Day. 30 g 0   • levothyroxine (SYNTHROID, LEVOTHROID) 137 MCG tablet Take 1 tablet by mouth Daily. 30 tablet 5   • metFORMIN (GLUCOPHAGE) 500 MG tablet Take 1 tablet by mouth Daily With Breakfast. 60 tablet 5   • metoprolol succinate XL (TOPROL-XL) 50 MG 24 hr tablet TAKE 1 TABLET EVERY NIGHT 90 tablet 3   • propafenone (RYTHMOL) 225 MG tablet Take 1 tablet by mouth Every 8 (Eight) Hours. 270 tablet 3   • rosuvastatin (CRESTOR) 20 MG tablet TAKE 1 TABLET EVERY NIGHT 90 tablet 3   • BISACODYL 5 MG EC tablet TK 4 TS PO FOR1 DOSE PRF CONSTIPATION     • clotrimazole-betamethasone (LOTRISONE) 1-0.05 % cream Apply  topically to the appropriate area as directed 2 (Two) Times a Day. 45 g 0   • doxazosin (CARDURA) 2 MG tablet AT THE START OF THERAPY, TAKE ONE-HALF (1/2) TABLET NIGHTLY FOR 1 WEEK, THEN 1 TABLET NIGHTLY AFTERWARD 90 tablet 3   • Empagliflozin-Linaglip-Metform (Trijardy XR) 25-5-1000 MG tablet sustained-release 24 hour Take 0.5 tablets by mouth Every Morning. 90 tablet 3   • fluconazole (Diflucan) 150 MG tablet Take 1 tablet by mouth Daily As Needed (yeast infection). 7 tablet 0   • lidocaine (LIDODERM) 5 % Place 1 patch on the skin as directed by provider Daily. Remove & Discard patch within 12 hours or as directed by MD 90 patch 1   • LORazepam (ATIVAN) 0.5 MG tablet Take 1 tablet by mouth Every 8 (Eight) Hours As Needed (DIZZINESS). 10 tablet 0   • magnesium citrate 1.745 GM/30ML solution solution  ML PO FOR 1 DOSE     • ondansetron (ZOFRAN) 4 MG tablet Take 1 tablet by mouth Every 8 (Eight) Hours As Needed for Vomiting. 30 tablet 0   • sildenafil (VIAGRA) 100 MG tablet Take 1 tablet by mouth Daily As Needed for Erectile Dysfunction. 30 tablet 2   • vitamin B-12 (CYANOCOBALAMIN) 1000 MCG tablet Take 1 tablet by mouth Daily. 30 tablet 5   •  Zoster Vac Recomb Adjuvanted (Shingrix) 50 MCG/0.5ML reconstituted suspension Inject 0.5 mL into the appropriate muscle as directed by prescriber See Admin Instructions. Repeat in 2-6 months 1 each 1     Current Facility-Administered Medications   Medication Dose Route Frequency Provider Last Rate Last Admin   • cyanocobalamin injection 1,000 mcg  1,000 mcg Intramuscular Q28 Days Dillan Nur MD   1,000 mcg at 06/06/19 1359        No Known Allergies    Objective:  Vitals:    01/05/21 1348   BP: 108/65   Pulse: 61   SpO2: 96%     Comfortable NAD  PERRL, conjunctiva clear  Neck supple, no JVD or thyromegaly appreciated  S1/S2 RRR, no m/r/g  Lungs CTA B, normal effort  Abdomen S/NT/ND (+) BS, no HSM appreciated  Extremities warm, no clubbing, cyanosis, or edema  Normal gait  No visible or palpable skin lesions  A/Ox4, mood and affect appropriate  Pulse exam:    Feet are warm bilateral  1+ edema bilateral wearing stockings  Capillary refill is normal  No evidence of ulceration or color change of the toes  PULSES  Right DP and PT are 2+ and Left DP and PT are 2+    DATA:      Results for orders placed during the hospital encounter of 10/01/19   Adult Transthoracic Echo Complete W/ Cont if Necessary Per Protocol    Narrative · Normal left ventricular cavity size and wall thickness noted. All left   ventricular wall segments contract normally.  · Left ventricular diastolic dysfunction (grade I) consistent with   impaired relaxation.  · Estimated EF appears to be in the range of 61 - 65%.  · The aortic valve is structurally normal. No aortic valve regurgitation   is present. No aortic valve stenosis is present.  · The mitral valve is abnormal in structure. Mild thickening and   calcification of the anterior and posterior mitral leaflets.. Mild mitral   valve regurgitation is present. No significant mitral valve stenosis is   present.  · The tricuspid valve is normal. No evidence of tricuspid valve stenosis   is  present. No tricuspid valve regurgitation is present.  · There is no evidence of pericardial effusion.                ECG 12 Lead    Date/Time: 1/5/2021 2:13 PM  Performed by: Ian Bishop MD  Authorized by: Ian Bishop MD   Comparison: not compared with previous ECG   Rhythm: sinus rhythm  Rate: normal  BPM: 57    Clinical impression: abnormal EKG  Comments: MT int: 224 ms  QRS dur: 130 ms  QT/Qtc: 453/448 ms  P-R-T axes: 56   -22   -18             Cardiovascular system:  BP Readings from Last 3 Encounters:   01/05/21 108/65   11/17/20 122/70   10/27/20 118/60     EKG  ECG 12 Lead    (Results Pending)     ECHO  Results for orders placed during the hospital encounter of 10/01/19   Adult Transthoracic Echo Complete W/ Cont if Necessary Per Protocol    Narrative · Normal left ventricular cavity size and wall thickness noted. All left   ventricular wall segments contract normally.  · Left ventricular diastolic dysfunction (grade I) consistent with   impaired relaxation.  · Estimated EF appears to be in the range of 61 - 65%.  · The aortic valve is structurally normal. No aortic valve regurgitation   is present. No aortic valve stenosis is present.  · The mitral valve is abnormal in structure. Mild thickening and   calcification of the anterior and posterior mitral leaflets.. Mild mitral   valve regurgitation is present. No significant mitral valve stenosis is   present.  · The tricuspid valve is normal. No evidence of tricuspid valve stenosis   is present. No tricuspid valve regurgitation is present.  · There is no evidence of pericardial effusion.        Last Venous Blood Gas  No results found for: PHVEN, HAR0LWB, PO2VEN, ANP2PDA, BEVEN, [id number]  Last Digoxin level      No results found for: DIGOXIN  Lipid panel   Triglycerides   Date Value Ref Range Status   09/25/2020 141 0 - 150 mg/dL Final     HDL Cholesterol   Date Value Ref Range Status   09/25/2020 24 (L) 40 - 60 mg/dL Final     LDL Chol Calc ([facility])      Date Value Ref Range Status   09/25/2020 25 0 - 100 mg/dL Final         A/P:  Advanced gait disorder with tendency falls unable to walk in a straight line  Previous neurology assessment done approximately one year ago. Will require revaluation for multiple falls   PAF currently appears to be in sinus rhythm  Stable Essential HTN  Normal LV function  Stable CAD post CABG remote  DM2 non insulin dependent    Plan  Discontinue amiodarone therapy  Referral back to Dr. Davis  Follow up in 6 months or sooner if needed.     Patient's Body mass index is 27.17 kg/m². BMI is above normal parameters. Recommendations include: nutrition counseling.         ICD-10-CM ICD-9-CM   1. Prolonged Q-T interval on ECG  R94.31 794.31   2. Paroxysmal atrial fibrillation (CMS/HCC)  I48.0 427.31   3. Unsteady gait when walking  R26.81 781.2   4. Frequent falls  R29.6 V15.88   5. Coronary artery disease involving coronary bypass graft of native heart without angina pectoris  I25.810 414.05   6. Essential hypertension  I10 401.9   7. Dyslipidemia  E78.5 272.4        Thank you for allowing me to participate in the care of Aaron Delgado. Feel free to contact me directly with any further questions or concerns.        Director, Cardiac Cath Lab    JOSE Corona, acting as scribe for Ian Bishop MD, [facility], [facility].   01/05/21  14:21 EST    I have read and agree the documentation that has been completed regarding this visit.  By signing this record, I attest that the documentation was completed by my physical presence and is an accurate record of the encounter.  Voice recognition / transcription technology used for some documentation in this chart in attempt to mitigate substantial inefficiencies created by this electronic health record technology.  As a result, there may be some typos and.or nonsensical language introduced into the chart that either overlooked in editing/review and/or that I am unable to correct as patient care needs require me  to prioritize my attention to bedside patient care rather than electronic documentation. MA

## 2021-01-25 PROBLEM — I25.10 CORONARY ARTERY DISEASE INVOLVING NATIVE CORONARY ARTERY OF NATIVE HEART WITHOUT ANGINA PECTORIS: Status: ACTIVE | Noted: 2021-01-01

## 2021-01-25 PROBLEM — R26.81 GAIT INSTABILITY: Status: ACTIVE | Noted: 2021-01-01

## 2021-01-25 NOTE — PROGRESS NOTES
Subjective   Aaron Delgado is a 75 y.o. male.     History of Present Illness     Atrial Fibrillation  Patient has a history of atrial fibrillation.  Since last here he has experienced increased shortness of breath with exertion.  There is no history of any orthopnea or PND and he denies any chest pain, palpitations, lightheadedness, diaphoresis or syncope. The patient has a past history of CAD, DM, HTN and hyperlipidemia.  He remains on apixaban. He denies  bleeding.  He continues to be followed by cardiology and underwent a reassessment with Dr. Bishop on 1/19/2021.  He was apparently instructed to discontinue amiodarone but remains on it  Lab Results   Component Value Date    WBC 6.48 01/13/2021    HGB 13.4 01/13/2021    HCT 41.7 01/13/2021    MCV 90.5 01/13/2021     01/13/2021     Coronary Artery Disease  S/P CABG.  Since last here he has experienced increased shortness of breath with exertion.  There is no history of any orthopnea or PND and he denies any chest pain, palpitations, lightheadedness, diaphoresis or syncope.  With compression hose his lower extremity edema remains well controlled    Type 2 Diabetes Mellitus  He admits to mild numbness and tingling of both feet.  There is no history of any polyuria, polydipsia, skin breakdown, or hypoglycemia.  He remains on metformin, linagliptin, and empagliflozin (together as trijardy xr). His last diabetic eye exam was within 1 year.    Lab Results   Component Value Date    HGBA1C 6.20 (H) 01/13/2021     Hyperlipidemia  He remains on simvastatin, and ezetimibe with no apparent side effects.  Lab Results   Component Value Date    CHLPL 65 01/13/2021    TRIG 127 01/13/2021    HDL 16 (L) 01/13/2021    LDL 26 01/13/2021     Hypertension  He remains on benazepril, metoprolol, and doxazosin.  He discontinued amlodipine since last here as his blood pressure was beginning to run low  Lab Results   Component Value Date    GLUCOSE 156 (H) 12/10/2019    BUN 13  01/13/2021    CREATININE 1.70 (H) 01/13/2021    EGFRIFNONA 40 (L) 01/13/2021    EGFRIFAFRI 48 (L) 01/13/2021    BCR 7.6 01/13/2021    K 4.5 01/13/2021    CO2 20.8 (L) 01/13/2021    CALCIUM 8.8 01/13/2021    PROTENTOTREF 6.1 01/13/2021    ALBUMIN 2.50 (L) 01/13/2021    LABIL2 0.7 01/13/2021     (H) 01/13/2021    ALT 35 01/13/2021     Hypothyroidism  He is currently on levothyroxine 137 qd  Lab Results   Component Value Date    TSH 8.960 (H) 01/13/2021     NAFLD  He has a history of elevated hepatic transaminases. He denies any difficulty swallowing, nausea, vomiting, abdominal pain, or change in his bowel habits and there is no history of any hematochezia or melena.  Ultrasound of the abdomen performed on 6/17/2019 was unremarkable.  Contrast CT of the abdomen performed on 7/13/2020 was reported as showing slight thickening of the gastric wall, sigmoid diverticuli, and prostate enlargement.  He underwent an EGD by Dr. Barnett on 8/17/2020 with no abnormalities found.  Biopsies at the antrum revealed mild gastritis.      Dizziness  He gives a more than 1 year history of intermittent dizziness.  This has been worse since last here.  The dizziness is described as a sense of movement that occurs with changes in position and resolves promptly with rest.  Within the last several months he has had increasing difficulty walking.  He denies any hearing loss, tinnitus, or difficulty with his eye hand coordination.  MRI of the brain performed on 2/18/2019 was unremarkable.  He has undergone an ENT assessment.  He is scheduled to undergo a neurology assessment next month with Dr. Davis    Labs  Most recent vitamin D 29.3 with a B12 of 807    The following portions of the patient's history were reviewed and updated as appropriate: allergies, current medications, past medical history, past social history and problem list.    Review of Systems   Constitutional: Negative for chills, fatigue and fever.   HENT: Negative for  congestion, ear pain, rhinorrhea, sneezing, sore throat and voice change.    Eyes: Negative for visual disturbance.   Respiratory: Positive for shortness of breath. Negative for cough and wheezing.    Cardiovascular: Negative for chest pain, palpitations and leg swelling.   Gastrointestinal: Negative for abdominal pain, blood in stool, constipation, diarrhea, nausea and vomiting.   Endocrine: Negative for polydipsia and polyuria.   Genitourinary: Negative for dysuria, frequency, hematuria and urgency.   Musculoskeletal: Positive for arthralgias. Negative for back pain and myalgias.   Skin: Negative for rash.   Neurological: Positive for dizziness and weakness (generalized). Negative for numbness and headaches.   Psychiatric/Behavioral: Negative for dysphoric mood and sleep disturbance. The patient is not nervous/anxious.      Objective   Physical Exam  Constitutional:       General: He is not in acute distress.     Appearance: Normal appearance. He is well-developed. He is not ill-appearing or diaphoretic.      Comments: Accompanied by his wife.  Gait slow and cautious.  No apparent distress   HENT:      Head: Atraumatic.      Right Ear: Tympanic membrane, ear canal and external ear normal.      Left Ear: Tympanic membrane, ear canal and external ear normal.   Eyes:      Conjunctiva/sclera: Conjunctivae normal.   Neck:      Thyroid: No thyroid mass or thyromegaly.      Vascular: No carotid bruit or JVD.      Trachea: Trachea normal. No tracheal deviation.   Cardiovascular:      Rate and Rhythm: Normal rate and regular rhythm.      Heart sounds: Normal heart sounds, S1 normal and S2 normal. No murmur. No gallop. No S3 or S4 sounds.       Comments: Bilateral graded compression hose  Pulmonary:      Effort: Pulmonary effort is normal.      Breath sounds: Normal breath sounds.   Abdominal:      General: Bowel sounds are normal. There is distension.      Palpations: Abdomen is soft. There is shifting dullness. There is  no hepatomegaly, splenomegaly or mass.      Tenderness: There is no abdominal tenderness.      Hernia: No hernia is present.   Musculoskeletal:      Right lower leg: No edema.      Left lower leg: No edema.   Lymphadenopathy:      Head:      Right side of head: No submental, submandibular, tonsillar, preauricular, posterior auricular or occipital adenopathy.      Left side of head: No submental, submandibular, tonsillar, preauricular, posterior auricular or occipital adenopathy.      Cervical: No cervical adenopathy.      Upper Body:      Right upper body: No supraclavicular adenopathy.      Left upper body: No supraclavicular adenopathy.   Skin:     General: Skin is warm and dry.      Coloration: Skin is not cyanotic, jaundiced or pale.      Findings: No rash.      Nails: There is no clubbing.     Neurological:      Mental Status: He is alert and oriented to person, place, and time.      Cranial Nerves: No cranial nerve deficit.      Sensory: Sensory deficit (decreased vibration sense both feet) present.      Motor: No tremor.      Coordination: Coordination normal.      Gait: Gait abnormal.   Psychiatric:         Attention and Perception: Attention normal.         Mood and Affect: Mood normal.         Speech: Speech normal.         Behavior: Behavior normal.       Assessment/Plan   Problems Addressed this Visit        Cardiac and Vasculature    CAD  Reminded regarding the importance of risk factor modification.  Continue current medication  Follow up with cardiology   Essential hypertension   Hypertension: at goal. Evidence of target organ damage: coronary artery disease.  Encouraged to continue to work on diet and exercise plan.   Continue current medication    Mixed hyperlipidemia  As above.   Continue current medication.    Paroxysmal atrial fibrillation (CMS/HCC)   Rate control is appropriate.. Patient is anticoagulated.   Avoid caffeine.  Avoid oral decongestants.  We will clarify with Dr. Bishop as to whether  patient should continue on amiodarone       ENT    Vertigo  Follow up with neurology        Endocrine and Metabolic    B12 deficiency    Hypothyroidism due to Hashimoto's thyroiditis  Clinically euthyroid.  Continue current medication.    Type 2 diabetes mellitus with peripheral neuropathy (CMS/HCC)  Diabetes mellitus Type II, under excellent control.   Encouraged to continue to pursue ADA diet  Encouraged aerobic exercise.  Continue current medication       Gastrointestinal Abdominal     Gastroesophageal reflux disease without esophagitis    NAFLD (nonalcoholic fatty liver disease)  With possible ascites  We will arrange an ultrasound of the abdomen along with a GI reassessment    Relevant Orders    US Abdomen Complete    Ambulatory Referral to Gastroenterology       Genitourinary and Reproductive     Erectile dysfunction    History of nephrolithiasis       Health Encounters      Encouraged to obtain a COVID-19 immunization when available.       Skin    Psoriasis       Symptoms and Signs    Gait instability  As above.  Will arrange physical therapy    Relevant Orders    Ambulatory Referral to Physical Therapy (Completed)      Diagnoses       Codes Comments    Paroxysmal atrial fibrillation (CMS/HCC)    -  Primary ICD-10-CM: I48.0  ICD-9-CM: 427.31     Mixed hyperlipidemia     ICD-10-CM: E78.2  ICD-9-CM: 272.2     Essential hypertension     ICD-10-CM: I10  ICD-9-CM: 401.9     Vertigo     ICD-10-CM: R42  ICD-9-CM: 780.4     Type 2 diabetes mellitus with peripheral neuropathy (CMS/HCC)     ICD-10-CM: E11.42  ICD-9-CM: 250.60, 357.2     Hypothyroidism due to Hashimoto's thyroiditis     ICD-10-CM: E03.8, E06.3  ICD-9-CM: 244.8, 245.2     B12 deficiency     ICD-10-CM: E53.8  ICD-9-CM: 266.2     NAFLD (nonalcoholic fatty liver disease)     ICD-10-CM: K76.0  ICD-9-CM: 571.8     Gastroesophageal reflux disease without esophagitis     ICD-10-CM: K21.9  ICD-9-CM: 530.81     History of nephrolithiasis     ICD-10-CM:  Z87.442  ICD-9-CM: V13.01     Erectile dysfunction due to arterial insufficiency     ICD-10-CM: N52.01  ICD-9-CM: 607.84     Healthcare maintenance     ICD-10-CM: Z00.00  ICD-9-CM: V70.0     Psoriasis     ICD-10-CM: L40.9  ICD-9-CM: 696.1     Gait instability     ICD-10-CM: R26.81  ICD-9-CM: 781.2

## 2021-02-01 NOTE — PROGRESS NOTES
"Subjective   Aaron Delgado is a 75 y.o. male.     Chief Complaint   Patient presents with   • Urine changes       History of Present Illness     Urine concern-here for decreased urine and swelling in his genital region.  He reports approx 3 weeks ago he had a yeast infection.  He reports he is not having burning with urination but he is dribbling.  He reports he is having to sit down to urinate due to inability to force urine out.  He reports urine is darker than his usual and some odor changes.  He reports abdomen and pelvic pressure are present.   He has no known prostate problems and has never had any symptoms like he is currently having.  He reports it has been at least a week and half since he has had a good urine stream.  No fever.  No bowel changes.   He reports he feels like he is not emptying his bladder completely.  He reports that the swelling improves while he is laying flat and not but by the end of the day \"it is terrible and I am huge\".    The following portions of the patient's history were reviewed and updated as appropriate: CC, ROS, allergies, current medications, past family history, past medical history, past social history, past surgical history and problem list.      Review of Systems   Constitutional: Negative for activity change, appetite change and fever.   HENT: Negative for congestion, ear pain, facial swelling, sore throat and trouble swallowing.    Eyes: Negative for blurred vision, double vision and redness.   Respiratory: Negative for cough, chest tightness, shortness of breath and wheezing.    Cardiovascular: Negative for chest pain, palpitations and leg swelling.   Gastrointestinal: Negative for blood in stool, constipation and diarrhea.   Endocrine: Negative.    Genitourinary: Positive for decreased urine volume, difficulty urinating and scrotal swelling. Negative for dysuria, flank pain, frequency, hematuria, nocturia and urgency.   Musculoskeletal: Negative.    Skin: Negative.  " "  Allergic/Immunologic: Negative.    Neurological: Positive for dizziness (chronic. \"for 2 years\"). Negative for weakness, light-headedness and headache.   Hematological: Negative.    Psychiatric/Behavioral: Negative for self-injury, sleep disturbance and suicidal ideas.   All other systems reviewed and are negative.      Objective     /70   Pulse 74   Temp 97.1 °F (36.2 °C)   Ht 175.3 cm (69.02\")   Wt 83 kg (183 lb)   SpO2 95%   BMI 27.01 kg/m²     Physical Exam  Vitals signs reviewed. Exam conducted with a chaperone present.   Constitutional:       General: He is not in acute distress.     Appearance: Normal appearance. He is well-developed.      Comments: Chronically ill-appearing   HENT:      Head: Normocephalic and atraumatic.      Comments: Oropharynx not examined.  Patient is presently wearing a face covering/mask due to COVID-19 pandemic.     Right Ear: Ear canal and external ear normal.      Left Ear: Tympanic membrane, ear canal and external ear normal.   Eyes:      General: No scleral icterus.     Pupils: Pupils are equal, round, and reactive to light.   Neck:      Musculoskeletal: Normal range of motion and neck supple.      Thyroid: No thyromegaly.      Vascular: No JVD.   Cardiovascular:      Rate and Rhythm: Normal rate and regular rhythm.      Heart sounds: Normal heart sounds.   Pulmonary:      Effort: Pulmonary effort is normal.      Breath sounds: Normal breath sounds.   Abdominal:      General: Abdomen is protuberant. Bowel sounds are normal. There is distension.      Palpations: Abdomen is soft.      Tenderness: There is no abdominal tenderness.   Genitourinary:     Comments: Diffuse swelling of penis and scrotal sac.  Generalized erythema noted.  Tenderness reported by patient with manipulation.  Unable to see glans penis due to edema  Musculoskeletal:      Comments: Gait slow and steady.  Using cane for ambulation.  At times does reach out to hold to wall for support and balance. "   Skin:     General: Skin is warm and dry.      Capillary Refill: Capillary refill takes less than 2 seconds.   Neurological:      Mental Status: He is alert and oriented to person, place, and time.      Cranial Nerves: No cranial nerve deficit.      Coordination: Coordination normal.      Gait: Gait normal.   Psychiatric:         Behavior: Behavior normal.         Thought Content: Thought content normal.         Judgment: Judgment normal.       Assessment/Plan     Problem List Items Addressed This Visit     None      Visit Diagnoses     Genital candidiasis in male    -  Primary    Relevant Medications    fluconazole (Diflucan) 150 MG tablet    hydroCHLOROthiazide (HYDRODIURIL) 12.5 MG tablet          Patient's Body mass index is 27.01 kg/m². BMI is within normal parameters. No follow-up required..       Understands disease processes and need for medications.  Understands reasons for urgent and emergent care.  Patient (& family) verbalized agreement for treatment plan.   Emotional support and active listening provided.  Patient provided time to verbalize feelings.    We will continue to treat for Candida.  Will prescribe hydrochlorothiazide for the next 2 to 3 days.  Encourage patient to elevate his pelvic region to aid with reduction in swelling.  If not improved by next appointment or urine status decreases further report to the hospital or will plan for urology consult.       RTC on Thursday for re-evaluation.        This document has been electronically signed by:  AMARILIS Rose FNP-C Dragon disclaimer:  Much of this encounter note is an electronic transcription/translation of spoken language to printed text. The electronic translation of spoken language may permit erroneous, or at times, nonsensical words or phrases to be inadvertently transcribed; Although I have reviewed the note for such errors, some may still exist.

## 2021-02-04 NOTE — PROGRESS NOTES
"Subjective   Aaron Delgado is a 75 y.o. male.     Chief Complaint   Patient presents with   • Genital swelling       History of Present Illness     Genital swelling-here to follow up for genital swelling.  He was treated with Diflucan.  He continues to deny any hematuria.  He reports swelling is unchanged.  No increase in erythema and edema \"is about the same\".  He reports no progression of the pain.  He continues to have some dribbling.  No fever is present.  He has never had these symptoms in the past.  He reports that the odor change and dark urine he previously had is better.  He will be having a US tomorrow     The following portions of the patient's history were reviewed and updated as appropriate: CC, ROS, allergies, current medications, past family history, past medical history, past social history, past surgical history and problem list.      Review of Systems   Constitutional: Negative for activity change, appetite change and fever.   HENT: Negative for congestion, ear pain, facial swelling, sore throat and trouble swallowing.    Eyes: Negative for blurred vision, double vision and redness.   Respiratory: Negative for cough, chest tightness, shortness of breath and wheezing.    Cardiovascular: Negative for chest pain, palpitations and leg swelling.   Gastrointestinal: Positive for abdominal distention. Negative for constipation and diarrhea.   Endocrine: Negative.    Genitourinary: Positive for frequency, penile swelling and scrotal swelling. Negative for dysuria, flank pain, hematuria and urgency.   Musculoskeletal: Positive for arthralgias and gait problem.   Skin: Negative.    Allergic/Immunologic: Negative.    Neurological: Positive for dizziness. Negative for weakness, light-headedness and headache.   Hematological: Negative.    Psychiatric/Behavioral: Negative for self-injury, sleep disturbance and suicidal ideas.   All other systems reviewed and are negative.      Objective     /70   Pulse " "64   Temp 97 °F (36.1 °C)   Ht 175.3 cm (69.02\")   Wt 84.8 kg (187 lb)   SpO2 96%   BMI 27.60 kg/m²     Physical Exam  Vitals signs reviewed. Exam conducted with a chaperone present.   Constitutional:       General: He is not in acute distress.     Appearance: Normal appearance. He is well-developed.      Comments: Chronically ill-appearing   HENT:      Head: Normocephalic and atraumatic.      Comments: Oropharynx not examined.  Patient is presently wearing a face covering/mask due to COVID-19 pandemic.     Right Ear: Ear canal and external ear normal.      Left Ear: Tympanic membrane, ear canal and external ear normal.   Eyes:      General: No scleral icterus.     Pupils: Pupils are equal, round, and reactive to light.   Neck:      Musculoskeletal: Normal range of motion and neck supple.      Thyroid: No thyromegaly.      Vascular: No JVD.   Cardiovascular:      Rate and Rhythm: Normal rate and regular rhythm.      Heart sounds: Normal heart sounds.   Pulmonary:      Effort: Pulmonary effort is normal.      Breath sounds: Normal breath sounds.   Abdominal:      General: Abdomen is protuberant. Bowel sounds are normal. There is distension.      Palpations: Abdomen is soft.      Tenderness: There is no abdominal tenderness.   Genitourinary:     Comments: Diffuse swelling of penis and scrotal sac.  Generalized erythema noted but less than on previous exam.  Penile swelling worse today than on previous exam.  Unable to see glans penis due to edema  Musculoskeletal:      Comments: Gait slow and steady.  Using walker for ambulatory support today   Skin:     General: Skin is warm and dry.      Capillary Refill: Capillary refill takes less than 2 seconds.   Neurological:      Mental Status: He is alert and oriented to person, place, and time.      Cranial Nerves: No cranial nerve deficit.      Coordination: Coordination normal.      Gait: Gait normal.   Psychiatric:         Behavior: Behavior normal.         Thought " Content: Thought content normal.         Judgment: Judgment normal.       Assessment/Plan     Problem List Items Addressed This Visit     None      Visit Diagnoses     Swelling of male genital structure    -  Primary    Relevant Orders    Ambulatory Referral to Urology             Understands disease processes and need for medications.  Understands reasons for urgent and emergent care.  Patient (& family) verbalized agreement for treatment plan.   Emotional support and active listening provided.  Patient provided time to verbalize feelings.    Urgent referral to urology.  Patient to report to the urology office after his abdominal ultrasound tomorrow.  Directions given to urology office.    RTC 3 to 5 days, sooner if needed for problems or concerns        This document has been electronically signed by:  AMARILIS Rose, FNP-C    Dragon disclaimer:  Much of this encounter note is an electronic transcription/translation of spoken language to printed text. The electronic translation of spoken language may permit erroneous, or at times, nonsensical words or phrases to be inadvertently transcribed; Although I have reviewed the note for such errors, some may still exist.

## 2021-02-05 NOTE — TELEPHONE ENCOUNTER
CALLED PATIENT TO CHECK ON HIM POST CLINIC VISIT, AND TO ENCOURAGE SCROTAL ELEVATION AS TOLERATED. WILL FOLLOW UP AS DISCUSSED.

## 2021-02-05 NOTE — PROGRESS NOTES
Chief Complaint:          Chief Complaint   Patient presents with   • Genital swelling / Urine Retention With Incomplete Bladder E     New pt With Genital Swelling /Urine Retention       HPI:   75 y.o. male very pleasant but ill looking male evaluated in clinic today.  He has been referred to us by his PCP AMARILIS Noel with concerns of male genital swelling.    Patient reports his genital swelling has been a gradual onset, but now has become unbearable and very bothersome to him. The patient is uncircumcised,.  He is unable to retract his foreskin due to extensive penile edema(Not balanitis, no redness, inflation or penile discharge noted), patient states he now  has to sit on his toilet all the time to be able to urinate, most often bearing down to force urine out. He feels he does not empty his bladder, but he is unable to give us any urine sample.      He reports lower abdominal pain and discomfort, pelvic pressure, suprapubic pain and discomfort.  He has urinary frequency, urgency, increased hesitancy, and straining on urination.  He reports back pain, but denies flank pain denies CVA tenderness.  He denies any episodes of gross hematuria, he reports microscopic hematuria.  Denies N/V/D, he has not had any chills or fevers.     He denies any recurrent bladder infections, his random bladder scan is >168cc, His IPSS score is 30 , Patient has significant prostate enlargement, his last PSA 2 years ago was 2.41    He had a recent ultrasound of his abdomen complete which shows advanced liver cirrhosis, with moderate  Volume ascites noted throughout the remainder of the abdominal cavity.    His PMHX as listed below.    Past Medical History:        Past Medical History:   Diagnosis Date   • Arthritis    • CHF (congestive heart failure) (CMS/HCC)    • Coronary artery disease    • GERD (gastroesophageal reflux disease)    • Hypothyroidism due to Hashimoto's thyroiditis 9/25/2020   • Non-alcoholic cirrhosis  (CMS/[facility])      The following portions of the patient's history were reviewed and updated as appropriate: allergies, current medications, past family history, past medical history, past social history, past surgical history and problem list.    Current Meds:     Current Outpatient Medications   Medication Sig Dispense Refill   • amiodarone (PACERONE) 200 MG tablet Take 1 tablet by mouth Daily. 90 tablet 3   • benazepril (LOTENSIN) 40 MG tablet TAKE 1 TABLET DAILY (WILL REPLACE THE 20 MG DOSE) 90 tablet 3   • doxazosin (CARDURA) 2 MG tablet AT THE START OF THERAPY, TAKE ONE-HALF (1/2) TABLET NIGHTLY FOR 1 WEEK, THEN 1 TABLET NIGHTLY AFTERWARD 90 tablet 3   • ELIQUIS 5 MG tablet tablet TAKE 1 TABLET EVERY 12 HOURS 180 tablet 3   • Empagliflozin-Linaglip-Metform (Trijardy XR) 25-5-1000 MG tablet sustained-release 24 hour Take 0.5 tablets by mouth Every Morning. 90 tablet 3   • hydroCHLOROthiazide (HYDRODIURIL) 12.5 MG tablet Take 1 tablet by mouth Every Morning. 5 tablet 0   • levothyroxine (SYNTHROID, LEVOTHROID) 137 MCG tablet Take 1 tablet by mouth Daily. 30 tablet 5   • metoprolol succinate XL (TOPROL-XL) 50 MG 24 hr tablet TAKE 1 TABLET EVERY NIGHT 90 tablet 3   • nystatin (MYCOSTATIN) 436876 UNIT/GM cream Apply  topically to the appropriate area as directed As Needed (rasg). 30 g 2   • propafenone (RYTHMOL) 225 MG tablet Take 1 tablet by mouth Every 8 (Eight) Hours. 270 tablet 3   • rosuvastatin (CRESTOR) 20 MG tablet TAKE 1 TABLET EVERY NIGHT 90 tablet 3   • sildenafil (VIAGRA) 100 MG tablet Take 1 tablet by mouth Daily As Needed for Erectile Dysfunction. 30 tablet 2   • vitamin B-12 (CYANOCOBALAMIN) 1000 MCG tablet Take 1 tablet by mouth Daily. 30 tablet 5   • Zoster Vac Recomb Adjuvanted (Shingrix) 50 MCG/0.5ML reconstituted suspension Inject 0.5 mL into the appropriate muscle as directed by prescriber See Admin Instructions. Repeat in 2-6 months 1 each 1     Current Facility-Administered Medications      Medication Dose Route Frequency Provider Last Rate Last Admin   • cyanocobalamin injection 1,000 mcg  1,000 mcg Intramuscular Q28 Days Dillan Nur MD   1,000 mcg at 06/06/19 1359        Allergies:      No Known Allergies     Past Surgical History:     Past Surgical History:   Procedure Laterality Date   • CHOLECYSTECTOMY     • COLONOSCOPY  01/2020    Dr. Fine- tubular adenomatous colon polyps x2   • CORONARY ARTERY BYPASS GRAFT     • ENDOSCOPY     • ENDOSCOPY N/A 8/17/2020    Procedure: ESOPHAGOGASTRODUODENOSCOPY WITH BIOPSY CPT CODE: 12776;  Surgeon: Andrew Barnett MD;  Location: [facility];  Service: Gastroenterology;  Laterality: N/A;         Social History:     Social History     Socioeconomic History   • Marital status:      Spouse name: Not on file   • Number of children: Not on file   • Years of education: Not on file   • Highest education level: Not on file   Occupational History   • Occupation: Retired   Social Needs   • Financial resource strain: Patient refused   • Food insecurity     Worry: Patient refused     Inability: Patient refused   • Transportation needs     Medical: Patient refused     Non-medical: Patient refused   Tobacco Use   • Smoking status: Never Smoker   • Smokeless tobacco: Never Used   Substance and Sexual Activity   • Alcohol use: No     Frequency: Never   • Drug use: No   • Sexual activity: Defer   Lifestyle   • Physical activity     Days per week: Patient refused     Minutes per session: Patient refused   • Stress: Patient refused   Relationships   • Social connections     Talks on phone: Patient refused     Gets together: Patient refused     Attends Gnosticist service: Patient refused     Active member of club or organization: Patient refused     Attends meetings of clubs or organizations: Patient refused     Relationship status: Patient refused       Family History:     Family History   Problem Relation Age of Onset   • No Known Problems Father     • No Known Problems Mother        Review of Systems:     Review of Systems   Constitutional: Positive for activity change, fatigue and unexpected weight gain. Negative for appetite change, chills and fever.   HENT: Negative for congestion and sinus pressure.    Eyes: Negative for blurred vision and double vision.   Respiratory: Negative for cough, shortness of breath and wheezing.    Cardiovascular: Negative for leg swelling.   Gastrointestinal: Positive for abdominal distention and abdominal pain. Negative for constipation, diarrhea, nausea and vomiting.   Genitourinary: Positive for decreased urine volume, difficulty urinating, frequency, genital sores, hematuria, nocturia, penile swelling, scrotal swelling, testicular pain and urgency. Negative for discharge, dysuria, flank pain, penile pain and urinary incontinence.   Musculoskeletal: Positive for back pain and gait problem.   Skin: Positive for color change ( jaundice) and pallor.   Neurological: Positive for weakness. Negative for dizziness, headache and confusion.   Psychiatric/Behavioral: Positive for sleep disturbance. Negative for agitation, behavioral problems and decreased concentration. The patient is not nervous/anxious.       IPSS Questionnaire (AUA-7):  Over the past month…    1)  How often have you had a sensation of not emptying your bladder completely after you finish urinating?  5 - Almost always   2)  How often have you had to urinate again less than two hours after you finished urinating? 5 - Almost always   3)  How often have you found you stopped and started again several times when you urinated?  5 - Almost always   4) How difficult have you found it to postpone urination?  3 - About half the time   5) How often have you had a weak urinary stream?  5 - Almost always   6) How often have you had to push or strain to begin urination?  5 - Almost always   7) How many times did you most typically get up to urinate from the time you went to bed  until the time you got up in the morning?  2 - 2 times   Total score:  0-7 mildly symptomatic    8-19 moderately symptomatic    20-35 severely symptomatic -------------------------------------------  30     Physical Exam:     Physical Exam  Constitutional:       General: He is in acute distress.      Appearance: He is well-developed. He is obese. He is ill-appearing.   HENT:      Head: Normocephalic and atraumatic.      Right Ear: External ear normal.      Left Ear: External ear normal.   Eyes:      General:         Right eye: No discharge.         Left eye: No discharge.      Conjunctiva/sclera: Conjunctivae normal.      Pupils: Pupils are equal, round, and reactive to light.   Neck:      Musculoskeletal: Normal range of motion and neck supple.      Thyroid: No thyromegaly.      Trachea: No tracheal deviation.   Cardiovascular:      Rate and Rhythm: Normal rate and regular rhythm.      Heart sounds: No murmur. No friction rub.   Pulmonary:      Effort: Pulmonary effort is normal. No respiratory distress.      Breath sounds: Normal breath sounds. No stridor.   Abdominal:      General: Bowel sounds are normal. There is distension.      Palpations: Abdomen is soft.      Tenderness: There is abdominal tenderness. There is guarding.      Hernia: A hernia (left inguinal) is present.   Genitourinary:     Penis: Uncircumcised. No tenderness or discharge.       Scrotum/Testes: Normal.      Rectum: Normal. Guaiac result negative.      Comments: Male Gu: Soft nontender abdomen with no organomegaly, rigidity, or tenderness.  He has EDEMATOUS, BUT normal external genitalia and uncircumcised phallus WITHOUT a freely movable foreskin DUE TO EXTENSIVE PENILE EDEMA. Bilaterally descended testes without masses there is A LEFT inguinal hernia, NO adenopathy or abnormalities. He had good rectal tone and a ENlargeD smooth firm prostate.  There is no nodularity or any suspicious rectal abnormalities    Musculoskeletal: Normal range of  motion.         General: Swelling and tenderness present. No deformity.      Right lower leg: Edema present.      Left lower leg: Edema present.      Comments: 3-4+ BLE ANARSARCA     Skin:     General: Skin is warm and dry.      Capillary Refill: Capillary refill takes less than 2 seconds.      Coloration: Skin is jaundiced and pale.   Neurological:      Mental Status: He is alert and oriented to person, place, and time.      Cranial Nerves: No cranial nerve deficit.      Coordination: Coordination normal.   Psychiatric:         Behavior: Behavior normal.         Thought Content: Thought content normal.         Judgment: Judgment normal.       Procedure:       Assessment/Plan:       ASSESSMENT  GENITAL SWELLING /BPH WITH INCOMPLETE BLADDER EMPTYING: Very pleasant but ill looking patient evaluated in clinic today with numerous concerns.  He is in good spirits, but very weak, requiring assistance with mobility.  He has generalized edema/ANARSARCA  3-4+ in his groin area and bilateral lower extremities.    Most bothersome to him is his penile swelling(NO redness, inflammation or penile discharge), urine frequency, urgency, hesitancy, straining on urination, and nocturia.  HE is unable to give us a urine sample for analysis, he is random bladder scan is >168cc, His IPSS score is 30 , Patient has significant prostate enlargement, his last PSA 2 years ago was 2.41    We both reviewed/discussed his recent abdominal ultrasound results which shows advanced liver cirrhosis, with moderate  Volume ascites noted throughout the remainder of the abdominal cavity.    Although the patient's groin swelling is most likely a result of his ascites due to his advanced liver cirrhosis, we discussed other causes such as serious infection, inflammation, trauma, malignancy and other abnormal processes such as his left inguinal hernia, pulled muscle, or testicular torsion for which she denies any of these. Depending on the cause, we also  discussed groin swelling can begin suddenly and disappear quickly.        NEXT, WE also Discussed the pathophysiology of BPH and obstruction. We discussed the static and dynamic effects of BPH as well as using 5 alpha reductase inhibitors versus alpha blockade.  We discussed the indications for transurethral surgery as well.  And/ or other therapeutic options available including all of the newer techniques.  He has no real symptomatology referable to his prostate other than Moderate Enlargement.  Rather than proceed with an expensive workup he doesn't need, at this time. I am recommending an alpha blocker tamsulosin 0.4 mg capsule daily(PT ON CARDURA 2MG).       PLAN  Discussed Starting  Flomax 0.4 mg daily. Will Recheck with PCP need to stop Cardura or increase dose to address BPH with Obstruction.    Discussed starting finasteride 5 mg daily -  will help shrink his prostate, prevent urinary retention.    Patient would benefit from a short course diuretic- Bumex if kidney function permits.  Will discuss with PCP     Discussed Conservative therapy, First by applying gentle pressure to his penile foreskin 2-3 times daily, gently pushing the fluid away from his penile head, to be able to retract his foreskin.  We discussed soaking in a warm bath tub as tolerated, also applying an ice pack wrapped in cloth 2-3 times a day as tolerated.    Recommend scrotal elevation, and also elevate his bilateral lower extremities daily, may benefit from a jockstrap.    Patient has a left inguinal hernia on exam, discussed referral once stable.    Recommend follow-up with GI ASAP, he has pending appointment with Dr. Gardiner 02/25/21    Discussed lower tract investigation when stable.  Patient has been scheduled for cystoscopy with Dr. Love 02/26/21    Will Follow-up with patient post procedure,    Patient is Agreeable with plan of care.    Patient reports that he is not currently experiencing any symptoms of urinary  incontinence.    Patient's Body mass index is 27.75 kg/m². BMI is above normal parameters. Recommendations include: educational material, exercise counseling and nutrition counseling.    Smoking Cessation Counseling:  Never a smoker.  Patient does not currently use any tobacco products.     Counseling was given to patient for the following topics diagnostic results including: Penile edema, secondary to ascites from liver cirrhosis, BPH with lower urinary tract symptoms, anasarca, instructions for management as follows: Start Flomax 0.4 mg daily, discussed finasteride, penile massage, and impressions as follows: Elevation and support, follow-up with GI ASAP, patient may benefit from diuretic as tolerated.. The interim medical history and current results were reviewed.  A treatment plan with follow-up was made for: Left Inguinal Hernia, BPH with Incomplete Bladder Emptying, Swelling of male genital structure [N50.89]         This document has been electronically signed by Griselda Cheng-Akwa, APRN February 9, 2021 19:33 EST

## 2021-02-08 NOTE — TELEPHONE ENCOUNTER
Caller: Aaron Delgado    Relationship: Self    Best call back number: [phone]    What test was performed: ULTRASOUND    When was the test performed: 02/04 OR 02/05

## 2021-02-09 NOTE — PROGRESS NOTES
He feels the same and said he would keep his GI appt.     -- Please let patient know that his U/S shows fairly advanced cirrhosis with ascites (excess fluid within the peritoneal cavity (abdomen))   He needs to keep the appt scheduled with GI   Has he felt any different as of late?

## 2021-02-10 NOTE — PATIENT INSTRUCTIONS
Scrotal Swelling  Scrotal swelling refers to a condition in which the sac of skin that contains the testes (scrotum) is enlarged or swollen. Many things can cause the scrotum to enlarge or swell, including:  · Fluid around the testicle (hydrocele).  · A weakened area in the muscles around the groin (hernia).  · An enlarged vein around the testicle (varicocele).  · An injury.  · An infection.  · Certain medical treatments.  · Certain medical conditions, such as congestive heart failure.  · A recent genital surgery or procedure.  · A twisting of the spermatic cord that cuts off blood supply (testicular torsion).  · Testicular cancer.  Scrotal swelling can happen along with scrotal pain.  Follow these instructions at home:  · Until the swelling goes away:  ? Rest. The best position to rest in is to lie down.  ? Limit activity.  · Put ice on the scrotum:  ? Put ice in a plastic bag.  ? Place a towel between your skin and the bag.  ? Leave the ice on for 20 minutes, 2-3 times a day for 1-2 days.  · Place a rolled towel under your testicles for support.  · Wear loose-fitting clothing or an athletic support cup for comfort.  · Take over-the-counter and prescription medicines only as told by your health care provider.  · Perform a monthly self-exam of the scrotum and penis. Feel for changes. Ask your health care provider how to perform a monthly self-exam if you are unsure.  Contact a health care provider if:  · You have a sudden pain that is persistent and does not improve.  · You have a heavy feeling or notice fluid in the scrotum.  · You have pain or burning while urinating.  · You have blood in your urine or semen.  · You feel a lump around the testicle.  · You notice that one testicle is larger than the other. Keep in mind that a small difference in size is normal.  · You have a persistent dull ache or pain in your groin or scrotum.  Get help right away if:  · The pain does not go away.  · The pain becomes  severe.  · You have a fever or chills.  · You have pain or vomiting that cannot be controlled.  · One or both sides of the scrotum are very red and swollen.  · There is redness spreading upward from your scrotum to your abdomen or downward from your scrotum to your thighs.  Summary  · Scrotal swelling refers to a condition in which the sac of skin that contains the testes (scrotum) is enlarged.  · Many things can cause the scrotum to swell, including hydrocele, a hernia, and a varicocele.  · Limiting activity and icing the scrotum may help reduce swelling and pain.  Contact your health care provider if you develop scrotal pain that is   Ascites    Ascites is a collection of too much fluid in the abdomen. Ascites can range from mild to severe. If ascites is not treated, it can get worse.  What are the causes?  This condition may be caused by:  · A liver condition called cirrhosis. This is the most common cause of ascites.  · Long-term (chronic) or alcoholic hepatitis.  · Infection or inflammation in the abdomen.  · Cancer in the abdomen.  · Heart failure.  · Kidney disease.  · Inflammation of the pancreas.  · Clots in the veins of the liver.  What are the signs or symptoms?  Symptoms of this condition include:  · A feeling of fullness in the abdomen. This is common.  · An increase in the size of the abdomen or waist.  · Swelling in the legs.  · Swelling of the scrotum (in men).  · Difficulty breathing.  · Pain in the abdomen.  · Sudden weight gain.  If the condition is mild, you may not have symptoms.  How is this diagnosed?  This condition is diagnosed based on your medical history and a physical exam. Your health care provider may order imaging tests, such as an ultrasound or CT scan of your abdomen.  How is this treated?  Treatment for this condition depends on the cause of the ascites. It may include:  · Taking a pill to make you urinate. This is called a water pill (diuretic pill).  · Strictly reducing your salt  (sodium) intake. Salt can cause extra fluid to be kept (retained) in the body, and this makes ascites worse.  · Having a procedure to remove fluid from your abdomen (paracentesis).  · Having a procedure that connects two of the major veins within your liver and relieves pressure on your liver. This is called a TIPS procedure (transjugular intrahepatic portosystemic shunt procedure).  · Placement of a drainage catheter (peritoneovenous shunt) to manage the extra fluid in the abdomen.  Ascites may go away or improve when the condition that caused it is treated.  Follow these instructions at home:  · Keep track of your weight. To do this, weigh yourself at the same time every day and write down your weight.  · Keep track of how much you drink and any changes in how much or how often you urinate.  · Follow any instructions that your health care provider gives you about how much to drink.  · Try not to eat salty (high-sodium) foods.  · Take over-the-counter and prescription medicines only as told by your health care provider.  · Keep all follow-up visits as told by your health care provider. This is important.  · Report any changes in your health to your health care provider, especially if you develop new symptoms or your symptoms get worse.  Contact a health care provider if:  · You gain more than 3 lb (1.36 kg) in 3 days.  · Your waist size increases.  · You have new swelling in your legs.  · The swelling in your legs gets worse.  Get help right away if:  · You have a fever.  · You are confused.  · You have new or worsening breathing trouble.  · You have new or worsening pain in your abdomen.  · You have new or worsening swelling in the scrotum (in men).  Summary  · Ascites is a collection of too much fluid in the abdomen.  · Ascites may be caused by various conditions, such as cirrhosis, hepatitis, cancer, or congestive heart failure.  · Symptoms may include swelling of the abdomen and other areas due to extra fluid  in the body.  · Treatments may involve dietary changes, medicines, or procedures.  This information is not intended to replace advice given to you by your health care provider. Make sure you discuss any questions you have with your health care provider.  Document Revised: 11/19/2019 Document Reviewed: 08/30/2018  Elsevier Patient Education © 2020 [employer]    Inguinal Hernia, Adult  An inguinal hernia is when fat or your intestines push through a weak spot in a muscle where your leg meets your lower belly (groin). This causes a rounded lump (bulge). This kind of hernia could also be:  · In your scrotum, if you are male.  · In folds of skin around your vagina, if you are female.  There are three types of inguinal hernias. These include:  · Hernias that can be pushed back into the belly (are reducible). This type rarely causes pain.  · Hernias that cannot be pushed back into the belly (are incarcerated).  · Hernias that cannot be pushed back into the belly and lose their blood supply (are strangulated). This type needs emergency surgery.  If you do not have symptoms, you may not need treatment. If you have symptoms or a large hernia, you may need surgery.  Follow these instructions at home:  Lifestyle  · Do these things if told by your doctor so you do not have trouble pooping (constipation):  ? Drink enough fluid to keep your pee (urine) pale yellow.  ? Eat foods that have a lot of fiber. These include fresh fruits and vegetables, whole grains, and beans.  ? Limit foods that are high in fat and processed sugars. These include foods that are fried or sweet.  ? Take medicine for trouble pooping.  · Avoid lifting heavy objects.  · Avoid standing for long amounts of time.  · Do not use any products that contain nicotine or tobacco. These include cigarettes and e-cigarettes. If you need help quitting, ask your doctor.  · Stay at a healthy weight.  General instructions  · You may try to push your hernia in by very  gently pressing on it when you are lying down. Do not try to force the bulge back in if it will not push in easily.  · Watch your hernia for any changes in shape, size, or color. Tell your doctor if you see any changes.  · Take over-the-counter and prescription medicines only as told by your doctor.  · Keep all follow-up visits as told by your doctor. This is important.  Contact a doctor if:  · You have a fever.  · You have new symptoms.  · Your symptoms get worse.  Get help right away if:  · The area where your leg meets your lower belly has:  ? Pain that gets worse suddenly.  ? A bulge that gets bigger suddenly, and it does not get smaller after that.  ? A bulge that turns red or purple.  ? A bulge that is painful when you touch it.  · You are a man, and your scrotum:  ? Suddenly feels painful.  ? Suddenly changes in size.  · You cannot push the hernia in by very gently pressing on it when you are lying down. Do not try to force the bulge back in if it will not push in easily.  · You feel sick to your stomach (nauseous), and that feeling does not go away.  · You throw up (vomit), and that keeps happening.  · You have a fast heartbeat.  · You cannot poop (have a bowel movement) or pass gas.  These symptoms may be an emergency. Do not wait to see if the symptoms will go away. Get medical help right away. Call your local emergency services (911 in the [place]  Summary  · An inguinal hernia is when fat or your intestines push through a weak spot in a muscle where your leg meets your lower belly (groin). This causes a rounded lump (bulge).  · If you do not have symptoms, you may not need treatment. If you have symptoms or a large hernia, you may need surgery.  · Avoid lifting heavy objects. Also avoid standing for long amounts of time.  · Do not try to force the bulge back in if it will not push in easily.  This information is not intended to replace advice given to you by your health care provider. Make sure you  discuss any questions you have with your health care provider.  Document Revised: 01/19/2019 Document Reviewed: 09/19/2018  [email or web address] Patient Education © 2020 [email or web address]    Benign Prostatic Hyperplasia    Benign prostatic hyperplasia (BPH) is an enlarged prostate gland that is caused by the normal aging process and not by cancer. The prostate is a walnut-sized gland that is involved in the production of semen. It is located in front of the rectum and below the bladder. The bladder stores urine and the urethra is the tube that carries the urine out of the body. The prostate may get bigger as a man gets older.  An enlarged prostate can press on the urethra. This can make it harder to pass urine. The build-up of urine in the bladder can cause infection. Back pressure and infection may progress to bladder damage and kidney (renal) failure.  What are the causes?  This condition is part of a normal aging process. However, not all men develop problems from this condition. If the prostate enlarges away from the urethra, urine flow will not be blocked. If it enlarges toward the urethra and compresses it, there will be problems passing urine.  What increases the risk?  This condition is more likely to develop in men over the age of 50 years.  What are the signs or symptoms?  Symptoms of this condition include:  Getting up often during the night to urinate.  Needing to urinate frequently during the day.  Difficulty starting urine flow.  Decrease in size and strength of your urine stream.  Leaking (dribbling) after urinating.  Inability to pass urine. This needs immediate treatment.  Inability to completely empty your bladder.  Pain when you pass urine. This is more common if there is also an infection.  Urinary tract infection (UTI).  How is this diagnosed?  This condition is diagnosed based on your medical history, a physical exam, and your symptoms. Tests will also be done, such as:  A post-void bladder scan. This measures  any amount of urine that may remain in your bladder after you finish urinating.  A digital rectal exam. In a rectal exam, your health care provider checks your prostate by putting a lubricated, gloved finger into your rectum to feel the back of your prostate gland. This exam detects the size of your gland and any abnormal lumps or growths.  An exam of your urine (urinalysis).  A prostate specific antigen (PSA) screening. This is a blood test used to screen for prostate cancer.  An ultrasound. This test uses sound waves to electronically produce a picture of your prostate gland.  Your health care provider may refer you to a specialist in kidney and prostate diseases (urologist).  How is this treated?  Once symptoms begin, your health care provider will monitor your condition (active surveillance or watchful waiting). Treatment for this condition will depend on the severity of your condition. Treatment may include:  Observation and yearly exams. This may be the only treatment needed if your condition and symptoms are mild.  Medicines to relieve your symptoms, including:  Medicines to shrink the prostate.  Medicines to relax the muscle of the prostate.  Surgery in severe cases. Surgery may include:  Prostatectomy. In this procedure, the prostate tissue is removed completely through an open incision or with a laparoscope or robotics.  Transurethral resection of the prostate (TURP). In this procedure, a tool is inserted through the opening at the tip of the penis (urethra). It is used to cut away tissue of the inner core of the prostate. The pieces are removed through the same opening of the penis. This removes the blockage.  Transurethral incision (TUIP). In this procedure, small cuts are made in the prostate. This lessens the prostate's pressure on the urethra.  Transurethral microwave thermotherapy (TUMT). This procedure uses microwaves to create heat. The heat destroys and removes a small amount of prostate  tissue.  Transurethral needle ablation (TUNA). This procedure uses radio frequencies to destroy and remove a small amount of prostate tissue.  Interstitial laser coagulation (ILC). This procedure uses a laser to destroy and remove a small amount of prostate tissue.  Transurethral electrovaporization (TUVP). This procedure uses electrodes to destroy and remove a small amount of prostate tissue.  Prostatic urethral lift. This procedure inserts an implant to push the lobes of the prostate away from the urethra.  Follow these instructions at home:  Take over-the-counter and prescription medicines only as told by your health care provider.  Monitor your symptoms for any changes. Contact your health care provider with any changes.  Avoid drinking large amounts of liquid before going to bed or out in public.  Avoid or reduce how much caffeine or alcohol you drink.  Give yourself time when you urinate.  Keep all follow-up visits as told by your health care provider. This is important.  Contact a health care provider if:  You have unexplained back pain.  Your symptoms do not get better with treatment.  You develop side effects from the medicine you are taking.  Your urine becomes very dark or has a bad smell.  Your lower abdomen becomes distended and you have trouble passing your urine.  Get help right away if:  You have a fever or chills.  You suddenly cannot urinate.  You feel lightheaded, or very dizzy, or you faint.  There are large amounts of blood or clots in the urine.  Your urinary problems become hard to manage.  You develop moderate to severe low back or flank pain. The flank is the side of your body between the ribs and the hip.  These symptoms may represent a serious problem that is an emergency. Do not wait to see if the symptoms will go away. Get medical help right away. Call your local emergency services (911 in the [place] Do not drive yourself to the hospital.  Summary  Benign prostatic hyperplasia (BPH) is  an enlarged prostate that is caused by the normal aging process and not by cancer.  An enlarged prostate can press on the urethra. This can make it hard to pass urine.  This condition is part of a normal aging process and is more likely to develop in men over the age of 50 years.  Get help right away if you suddenly cannot urinate.  This information is not intended to replace advice given to you by your health care provider. Make sure you discuss any questions you have with your health care provider.  Document Revised: 11/12/2019 Document Reviewed: 01/22/2018  Elsevier Patient Education © 2020 [employer]  · sudden and persistent, or if you have pain while urinating. Do this also if you feel a lump around the testicle or notice blood in your urine or semen.  · Get help right away for uncontrolled pain or vomiting, for very red and swollen scrotum, or for fever or chills.  This information is not intended to replace advice given to you by your health care provider. Make sure you discuss any questions you have with your health care provider.  Document Revised: 11/30/2018 Document Reviewed: 03/05/2018  Elsevier Patient Education © 2020 [employer]

## 2021-02-25 NOTE — PROGRESS NOTES
"Subjective   Aaron Delgado is a 75 y.o. male who presents to the office today as a follow up appointment regarding Abdominal Pain, Bloated, and Ascites      History of Present Illness:  The patient presents with complaint of cirrhosis of the liver and ascites.  He states this began a couple of months ago.  It continued to get worse.  He now feels like he can not breath.  He eats only a little b/c his abdomen is so distended.  No issue with mentation.  He has a hard time sleeping at night due to the abdominal swelling.  He is diabetic and has carried his weight in the abdomen.  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  The patient feels that he has actually lost weight in his arms and legs but his abdomen is swollen and tight.  An ultrasound of the abdomen performed on 1/25/2021 revealed moderate ascites with advanced cirrhosis.  Interestingly, a CT scan of the abdomen and pelvis in June 2020 did not reveal cirrhosis.  It did show some fluid in the pelvis.       Review of Systems:  Review of Systems   Constitutional: Negative.    HENT: Negative for sore throat and trouble swallowing.    Eyes: Negative.    Respiratory: Negative for chest tightness.    Cardiovascular: Negative for chest pain.   Gastrointestinal: Positive for abdominal distention and abdominal pain. Negative for anal bleeding, blood in stool, constipation, diarrhea, nausea, rectal pain and vomiting.   Endocrine: Negative.    Genitourinary: Negative.    Musculoskeletal: Negative for back pain and neck pain.   Skin: Negative.  Negative for rash.   Allergic/Immunologic: Negative for environmental allergies and food allergies.   Neurological: Positive for dizziness. Negative for headaches.   Hematological: Bruises/bleeds easily.   Psychiatric/Behavioral: Negative for agitation and sleep disturbance. The patient is not nervous/anxious.        Past Medical History:  Past Medical History:   Diagnosis Date   • " Arthritis    • CHF (congestive heart failure) (CMS/HCC)    • Coronary artery disease    • GERD (gastroesophageal reflux disease)    • Hypothyroidism due to Hashimoto's thyroiditis 9/25/2020   • Non-alcoholic cirrhosis (CMS/HCC)        Past Surgical History:  Past Surgical History:   Procedure Laterality Date   • CHOLECYSTECTOMY     • COLONOSCOPY  01/2020    Dr. Fine- tubular adenomatous colon polyps x2   • CORONARY ARTERY BYPASS GRAFT     • ENDOSCOPY     • ENDOSCOPY N/A 8/17/2020    Procedure: ESOPHAGOGASTRODUODENOSCOPY WITH BIOPSY CPT CODE: 36430;  Surgeon: Andrew Barnett MD;  Location: [facility];  Service: Gastroenterology;  Laterality: N/A;       Family History:  Family History   Problem Relation Age of Onset   • No Known Problems Father    • No Known Problems Mother        Social History:  Social History     Socioeconomic History   • Marital status:      Spouse name: Not on file   • Number of children: Not on file   • Years of education: Not on file   • Highest education level: Not on file   Occupational History   • Occupation: Retired   Social Needs   • Financial resource strain: Patient refused   • Food insecurity     Worry: Patient refused     Inability: Patient refused   • Transportation needs     Medical: Patient refused     Non-medical: Patient refused   Tobacco Use   • Smoking status: Never Smoker   • Smokeless tobacco: Never Used   Substance and Sexual Activity   • Alcohol use: No     Frequency: Never   • Drug use: No   • Sexual activity: Defer   Lifestyle   • Physical activity     Days per week: Patient refused     Minutes per session: Patient refused   • Stress: Patient refused   Relationships   • Social connections     Talks on phone: Patient refused     Gets together: Patient refused     Attends [employer] service: Patient refused     Active member of club or organization: Patient refused     Attends meetings of clubs or organizations: Patient refused     Relationship status:  Patient refused       Current Medication List:    Current Outpatient Medications:   •  amiodarone (PACERONE) 200 MG tablet, Take 1 tablet by mouth Daily., Disp: 90 tablet, Rfl: 3  •  benazepril (LOTENSIN) 40 MG tablet, TAKE 1 TABLET DAILY (WILL REPLACE THE 20 MG DOSE), Disp: 90 tablet, Rfl: 3  •  doxazosin (CARDURA) 2 MG tablet, AT THE START OF THERAPY, TAKE ONE-HALF (1/2) TABLET NIGHTLY FOR 1 WEEK, THEN 1 TABLET NIGHTLY AFTERWARD, Disp: 90 tablet, Rfl: 3  •  ELIQUIS 5 MG tablet tablet, TAKE 1 TABLET EVERY 12 HOURS, Disp: 180 tablet, Rfl: 3  •  Empagliflozin-Linaglip-Metform (Trijardy XR) 25-5-1000 MG tablet sustained-release 24 hour, Take 0.5 tablets by mouth Every Morning., Disp: 90 tablet, Rfl: 3  •  finasteride (PROSCAR) 5 MG tablet, Take 1 tablet by mouth Daily., Disp: 30 tablet, Rfl: 5  •  hydroCHLOROthiazide (HYDRODIURIL) 12.5 MG tablet, Take 1 tablet by mouth Every Morning., Disp: 5 tablet, Rfl: 0  •  levothyroxine (SYNTHROID, LEVOTHROID) 137 MCG tablet, Take 1 tablet by mouth Daily., Disp: 30 tablet, Rfl: 5  •  metoprolol succinate XL (TOPROL-XL) 50 MG 24 hr tablet, TAKE 1 TABLET EVERY NIGHT, Disp: 90 tablet, Rfl: 3  •  nystatin (MYCOSTATIN) 804184 UNIT/GM cream, Apply  topically to the appropriate area as directed As Needed (rasg)., Disp: 30 g, Rfl: 2  •  propafenone (RYTHMOL) 225 MG tablet, Take 1 tablet by mouth Every 8 (Eight) Hours., Disp: 270 tablet, Rfl: 3  •  rosuvastatin (CRESTOR) 20 MG tablet, TAKE 1 TABLET EVERY NIGHT, Disp: 90 tablet, Rfl: 3  •  sildenafil (VIAGRA) 100 MG tablet, Take 1 tablet by mouth Daily As Needed for Erectile Dysfunction., Disp: 30 tablet, Rfl: 2  •  tamsulosin (FLOMAX) 0.4 MG capsule 24 hr capsule, Take 1 capsule by mouth Daily., Disp: 30 capsule, Rfl: 5  •  vitamin B-12 (CYANOCOBALAMIN) 1000 MCG tablet, Take 1 tablet by mouth Daily., Disp: 30 tablet, Rfl: 5  •  Zoster Vac Recomb Adjuvanted (Shingrix) 50 MCG/0.5ML reconstituted suspension, Inject 0.5 mL into the  "appropriate muscle as directed by prescriber See Admin Instructions. Repeat in 2-6 months, Disp: 1 each, Rfl: 1    Current Facility-Administered Medications:   •  cyanocobalamin injection 1,000 mcg, 1,000 mcg, Intramuscular, Q28 Days, Dillan Nur MD, 1,000 mcg at 06/06/19 1359    Allergies:   Patient has no known allergies.    Vitals:  BP 99/58   Pulse 62   Temp 96.8 °F (36 °C)   Ht 175.3 cm (69\")   Wt 80.3 kg (177 lb)   SpO2 94%   BMI 26.14 kg/m²     Physical Exam:  Physical Exam  Constitutional:       Appearance: He is normal weight.   HENT:      Head: Normocephalic and atraumatic.      Nose: Nose normal. No congestion or rhinorrhea.   Eyes:      General: No scleral icterus.     Extraocular Movements: Extraocular movements intact.      Conjunctiva/sclera: Conjunctivae normal.      Pupils: Pupils are equal, round, and reactive to light.   Neck:      Musculoskeletal: Normal range of motion and neck supple.   Cardiovascular:      Rate and Rhythm: Normal rate and regular rhythm.      Pulses: Normal pulses.      Heart sounds: Normal heart sounds.   Pulmonary:      Effort: Pulmonary effort is normal.      Breath sounds: Normal breath sounds.   Abdominal:      General: Abdomen is flat. Bowel sounds are normal. There is distension (Moderate to severe abdominal distention).      Palpations: Abdomen is soft. There is no shifting dullness, fluid wave, hepatomegaly, splenomegaly, mass or pulsatile mass.      Tenderness: There is no abdominal tenderness. There is no guarding or rebound.      Hernia: No hernia is present.   Musculoskeletal:         General: Swelling (1+ lower extremity edema bilaterally compression hose in place) present. No tenderness.   Skin:     General: Skin is warm and dry.      Coloration: Skin is not jaundiced.   Neurological:      General: No focal deficit present.      Mental Status: He is alert and oriented to person, place, and time.   Psychiatric:         Mood and Affect: Mood " normal.         Behavior: Behavior normal.         Results Review:  Lab Results:   No visits with results within 1 Month(s) from this visit.   Latest known visit with results is:   Lab on 01/13/2021   Component Date Value Ref Range Status   • WBC 01/13/2021 6.48  3.40 - 10.80 10*3/mm3 Final   • RBC 01/13/2021 4.61  4.14 - 5.80 10*6/mm3 Final   • Hemoglobin 01/13/2021 13.4  13.0 - 17.7 g/dL Final   • Hematocrit 01/13/2021 41.7  37.5 - 51.0 % Final   • MCV 01/13/2021 90.5  79.0 - 97.0 fL Final   • MCH 01/13/2021 29.1  26.6 - 33.0 pg Final   • MCHC 01/13/2021 32.1  31.5 - 35.7 g/dL Final   • RDW 01/13/2021 14.1  12.3 - 15.4 % Final   • Platelets 01/13/2021 215  140 - 450 10*3/mm3 Final   • Neutrophil Rel % 01/13/2021 73.5  42.7 - 76.0 % Final   • Lymphocyte Rel % 01/13/2021 14.0* 19.6 - 45.3 % Final   • Monocyte Rel % 01/13/2021 8.5  5.0 - 12.0 % Final   • Eosinophil Rel % 01/13/2021 2.6  0.3 - 6.2 % Final   • Basophil Rel % 01/13/2021 1.1  0.0 - 1.5 % Final   • Neutrophils Absolute 01/13/2021 4.76  1.70 - 7.00 10*3/mm3 Final   • Lymphocytes Absolute 01/13/2021 0.91  0.70 - 3.10 10*3/mm3 Final   • Monocytes Absolute 01/13/2021 0.55  0.10 - 0.90 10*3/mm3 Final   • Eosinophils Absolute 01/13/2021 0.17  0.00 - 0.40 10*3/mm3 Final   • Basophils Absolute 01/13/2021 0.07  0.00 - 0.20 10*3/mm3 Final   • Immature Granulocyte Rel % 01/13/2021 0.3  0.0 - 0.5 % Final   • Immature Grans Absolute 01/13/2021 0.02  0.00 - 0.05 10*3/mm3 Final   • nRBC 01/13/2021 0.0  0.0 - 0.2 /100 WBC Final   • Glucose 01/13/2021 164* 65 - 99 mg/dL Final   • BUN 01/13/2021 13  8 - 23 mg/dL Final   • Creatinine 01/13/2021 1.70* 0.76 - 1.27 mg/dL Final   • eGFR Non  Am 01/13/2021 40* >60 mL/min/1.73 Final    Comment: The MDRD GFR formula is only valid for adults with stable  renal function between ages 18 and 70.     • eGFR  Am 01/13/2021 48* >60 mL/min/1.73 Final   • BUN/Creatinine Ratio 01/13/2021 7.6  7.0 - 25.0 Final   • Sodium  01/13/2021 134* 136 - 145 mmol/L Final   • Potassium 01/13/2021 4.5  3.5 - 5.2 mmol/L Final   • Chloride 01/13/2021 107  98 - 107 mmol/L Final   • Total CO2 01/13/2021 20.8* 22.0 - 29.0 mmol/L Final   • Calcium 01/13/2021 8.8  8.6 - 10.5 mg/dL Final   • Total Protein 01/13/2021 6.1  6.0 - 8.5 g/dL Final   • Albumin 01/13/2021 2.50* 3.50 - 5.20 g/dL Final   • Globulin 01/13/2021 3.6  gm/dL Final   • A/G Ratio 01/13/2021 0.7  g/dL Final   • Total Bilirubin 01/13/2021 0.6  0.0 - 1.2 mg/dL Final   • Alkaline Phosphatase 01/13/2021 516* 39 - 117 U/L Final   • AST (SGOT) 01/13/2021 145* 1 - 40 U/L Final   • ALT (SGPT) 01/13/2021 35  1 - 41 U/L Final   • Hemoglobin A1C 01/13/2021 6.20* 4.80 - 5.60 % Final    Comment: Hemoglobin A1C Ranges:  Increased Risk for Diabetes  5.7% to 6.4%  Diabetes                     >= 6.5%  Diabetic Goal                < 7.0%     • Total Cholesterol 01/13/2021 65  0 - 200 mg/dL Final    Comment: Cholesterol Reference Ranges  (U.S. Department of Health and Human Services ATP III  Classifications)  Desirable          <200 mg/dL  Borderline High    200-239 mg/dL  High Risk          >240 mg/dL  Triglyceride Reference Ranges  (U.S. Department of Health and Human Services ATP III  Classifications)  Normal           <150 mg/dL  Borderline High  150-199 mg/dL  High             200-499 mg/dL  Very High        >500 mg/dL  HDL Reference Ranges  (U.S. Department of Health and Human Services ATP III  Classifcations)  Low     <40 mg/dl (major risk factor for CHD)  High    >60 mg/dl ('negative' risk factor for CHD)  LDL Reference Ranges  (U.S. Department of Health and Human Services ATP III  Classifcations)  Optimal          <100 mg/dL  Near Optimal     100-129 mg/dL  Borderline High  130-159 mg/dL  High             160-189 mg/dL  Very High        >189 mg/dL     • Triglycerides 01/13/2021 127  0 - 150 mg/dL Final   • HDL Cholesterol 01/13/2021 16* 40 - 60 mg/dL Final   • VLDL Cholesterol Osman 01/13/2021 23  5  - 40 mg/dL Final   • LDL Chol Calc ([facility]) 01/13/2021 26  0 - 100 mg/dL Final   • Vitamin B-12 01/13/2021 807  211 - 946 pg/mL Final    Results may be falsely increased if patient taking Biotin.   • 25 Hydroxy, Vitamin D 01/13/2021 29.3* 30.0 - 100.0 ng/ml Final    Comment: Results may be falsely increased if patient taking Biotin.  Reference Range for Total Vitamin D 25(OH)  Deficiency <20.0 ng/mL  Insufficiency 21-29 ng/mL  Sufficiency  ng/mL  Toxicity >100 ng/ml     • TSH 01/13/2021 8.960* 0.270 - 4.200 uIU/mL Final       Assessment/Plan     Visit Diagnoses:    ICD-10-CM ICD-9-CM   1. Other cirrhosis of liver (CMS/HCC)  K74.69 571.5   2. Other ascites  R18.8 789.59   3. Liver cirrhosis secondary to AGUILERA (CMS/HCC)  K75.81 571.8    K74.60 571.5       Plan:  Orders Placed This Encounter   Procedures   • AFP Tumor Marker   • Protime-INR   • Comprehensive Metabolic Panel   • POC Protime / INR   • CBC & Differential     Abdominal ultrasound with paracentesis has been ordered.  Hopefully, the patient can get this performed tomorrow.  I will obtain CMP CBC AFP PT/INR today.  I will check his creatinine and potassium before placing him on spironolactone and Lasix to help control his ascites.  The patient will follow up in 2 weeks.  He may need repeated paracentesis.        MEDICATION ISSUES:  Discussed medication options and treatment plan of prescribed medication as well as the risks, benefits, and side effects including potential falls, possible impaired driving and metabolic adversities among others. Patient is agreeable to call the office with any worsening of symptoms or onset of side effects. Patient is agreeable to call 911 or go to the nearest ER should he/she begin having SI/HI.     MEDS ORDERED DURING VISIT:  No orders of the defined types were placed in this encounter.      No follow-ups on file.             This document has been electronically signed by Jen Mejia MD   February 25, 2021  12:21 EST      Part of this note may be an electronic transcription/translation of spoken language to printed text using the Dragon Dictation System.

## 2021-03-01 NOTE — NURSING NOTE
Procedure ended. Pt. Tolerated well. No bleeding noted. Sterile dsg applied to site. Approx. [id number] yellow fluid obtained..

## 2021-03-04 NOTE — PROGRESS NOTES
Subjective   Aaron Delgado is a 75 y.o. male who presents to the office today as a follow up appointment regarding Ascities      History of Present Illness:  The patient presents today with complaint of ascites fluid leaking from site where he had his paracentesis.  He is leaking a large amount of fluid from the abdominal wall.  He is feeling significantly better.  He is breathing better.  He is eating better.  His wife however, feels he is not eating well at all.  His creatinine has been high and he has not been able to take diuretics. No jaundice.  His urine is yellow.  He is drinking about 2 to 3 bottles of water a day, juice and 1 1/2 Coke daily.  No fever, chills, nausea or vomiting.  He does complain of abdominal distention but this is improved.      Review of Systems:  Review of Systems   Constitutional: Negative.    HENT: Negative for sore throat and trouble swallowing.    Eyes: Negative.    Respiratory: Negative for chest tightness.    Cardiovascular: Negative for chest pain.   Gastrointestinal: Positive for abdominal distention and abdominal pain. Negative for anal bleeding, blood in stool, constipation, diarrhea, nausea, rectal pain and vomiting.   Endocrine: Negative.    Genitourinary: Negative.    Musculoskeletal: Negative for back pain and neck pain.   Skin: Negative.  Negative for rash.   Allergic/Immunologic: Negative for environmental allergies and food allergies.   Neurological: Positive for dizziness. Negative for headaches.   Hematological: Bruises/bleeds easily.   Psychiatric/Behavioral: Negative for agitation and sleep disturbance. The patient is not nervous/anxious.        Past Medical History:  Past Medical History:   Diagnosis Date   • Arthritis    • CHF (congestive heart failure) (CMS/HCC)    • Coronary artery disease    • GERD (gastroesophageal reflux disease)    • Hypothyroidism due to Hashimoto's thyroiditis 9/25/2020   • Non-alcoholic cirrhosis (CMS/HCC)        Past Surgical  History:  Past Surgical History:   Procedure Laterality Date   • CHOLECYSTECTOMY     • COLONOSCOPY  01/2020    Dr. Fine- tubular adenomatous colon polyps x2   • CORONARY ARTERY BYPASS GRAFT     • ENDOSCOPY     • ENDOSCOPY N/A 8/17/2020    Procedure: ESOPHAGOGASTRODUODENOSCOPY WITH BIOPSY CPT CODE: 40582;  Surgeon: Andrew Barnett MD;  Location: [facility];  Service: Gastroenterology;  Laterality: N/A;       Family History:  Family History   Problem Relation Age of Onset   • No Known Problems Father    • No Known Problems Mother        Social History:  Social History     Socioeconomic History   • Marital status:      Spouse name: Not on file   • Number of children: Not on file   • Years of education: Not on file   • Highest education level: Not on file   Occupational History   • Occupation: Retired   Social Needs   • Financial resource strain: Patient refused   • Food insecurity     Worry: Patient refused     Inability: Patient refused   • Transportation needs     Medical: Patient refused     Non-medical: Patient refused   Tobacco Use   • Smoking status: Never Smoker   • Smokeless tobacco: Never Used   Substance and Sexual Activity   • Alcohol use: No     Frequency: Never   • Drug use: No   • Sexual activity: Defer   Lifestyle   • Physical activity     Days per week: Patient refused     Minutes per session: Patient refused   • Stress: Patient refused   Relationships   • Social connections     Talks on phone: Patient refused     Gets together: Patient refused     Attends Baptism service: Patient refused     Active member of club or organization: Patient refused     Attends meetings of clubs or organizations: Patient refused     Relationship status: Patient refused       Current Medication List:    Current Outpatient Medications:   •  amiodarone (PACERONE) 200 MG tablet, Take 1 tablet by mouth Daily., Disp: 90 tablet, Rfl: 3  •  benazepril (LOTENSIN) 40 MG tablet, TAKE 1 TABLET DAILY (WILL  REPLACE THE 20 MG DOSE), Disp: 90 tablet, Rfl: 3  •  doxazosin (CARDURA) 4 MG tablet, Take 1 tablet by mouth Every Night., Disp: 90 tablet, Rfl: 5  •  ELIQUIS 5 MG tablet tablet, TAKE 1 TABLET EVERY 12 HOURS, Disp: 180 tablet, Rfl: 3  •  Empagliflozin-Linaglip-Metform (Trijardy XR) 25-5-1000 MG tablet sustained-release 24 hour, Take 0.5 tablets by mouth Every Morning., Disp: 90 tablet, Rfl: 3  •  finasteride (PROSCAR) 5 MG tablet, Take 1 tablet by mouth Daily., Disp: 30 tablet, Rfl: 11  •  hydroCHLOROthiazide (HYDRODIURIL) 12.5 MG tablet, Take 1 tablet by mouth Every Morning., Disp: 5 tablet, Rfl: 0  •  levothyroxine (SYNTHROID, LEVOTHROID) 137 MCG tablet, Take 1 tablet by mouth Daily., Disp: 30 tablet, Rfl: 5  •  metoprolol succinate XL (TOPROL-XL) 50 MG 24 hr tablet, TAKE 1 TABLET EVERY NIGHT, Disp: 90 tablet, Rfl: 3  •  nystatin (MYCOSTATIN) 603777 UNIT/GM cream, Apply  topically to the appropriate area as directed As Needed (rasg)., Disp: 30 g, Rfl: 2  •  propafenone (RYTHMOL) 225 MG tablet, Take 1 tablet by mouth Every 8 (Eight) Hours., Disp: 270 tablet, Rfl: 3  •  rosuvastatin (CRESTOR) 20 MG tablet, TAKE 1 TABLET EVERY NIGHT, Disp: 90 tablet, Rfl: 3  •  sildenafil (VIAGRA) 100 MG tablet, Take 1 tablet by mouth Daily As Needed for Erectile Dysfunction., Disp: 30 tablet, Rfl: 2  •  tamsulosin (FLOMAX) 0.4 MG capsule 24 hr capsule, Take 1 capsule by mouth Daily., Disp: 30 capsule, Rfl: 5  •  vitamin B-12 (CYANOCOBALAMIN) 1000 MCG tablet, Take 1 tablet by mouth Daily., Disp: 30 tablet, Rfl: 5  •  Zoster Vac Recomb Adjuvanted (Shingrix) 50 MCG/0.5ML reconstituted suspension, Inject 0.5 mL into the appropriate muscle as directed by prescriber See Admin Instructions. Repeat in 2-6 months, Disp: 1 each, Rfl: 1    Current Facility-Administered Medications:   •  cyanocobalamin injection 1,000 mcg, 1,000 mcg, Intramuscular, Q28 Days, Dillan Nur MD, 1,000 mcg at 06/06/19 9734    Allergies:   Patient has no  "known allergies.    Vitals:  Temp 96.8 °F (36 °C)   Ht 175.3 cm (69\")   Wt 86.2 kg (190 lb)   BMI 28.06 kg/m²     Physical Exam:  Physical Exam  Constitutional:       Appearance: He is normal weight.   HENT:      Head: Normocephalic and atraumatic.      Nose: Nose normal. No congestion or rhinorrhea.   Eyes:      General: No scleral icterus.     Extraocular Movements: Extraocular movements intact.      Conjunctiva/sclera: Conjunctivae normal.      Pupils: Pupils are equal, round, and reactive to light.   Neck:      Musculoskeletal: Normal range of motion and neck supple.   Cardiovascular:      Rate and Rhythm: Normal rate and regular rhythm.      Pulses: Normal pulses.      Heart sounds: Normal heart sounds.   Pulmonary:      Effort: Pulmonary effort is normal.      Breath sounds: Normal breath sounds.   Abdominal:      General: Abdomen is flat. Bowel sounds are normal. There is distension.      Palpations: Abdomen is soft. There is no shifting dullness, fluid wave, hepatomegaly, splenomegaly, mass or pulsatile mass.      Tenderness: There is no abdominal tenderness. There is no guarding or rebound.      Hernia: No hernia is present.      Comments: Fluid wave with ascites   Musculoskeletal:         General: No swelling or tenderness.   Skin:     General: Skin is warm and dry.      Coloration: Skin is not jaundiced.   Neurological:      General: No focal deficit present.      Mental Status: He is alert and oriented to person, place, and time.   Psychiatric:         Mood and Affect: Mood normal.         Behavior: Behavior normal.         Results Review:  Lab Results:   Hospital Outpatient Visit on 03/01/2021   Component Date Value Ref Range Status   • Protime 03/01/2021 18.4* 11.9 - 14.1 Seconds Final    Note new Reference Range   • INR 03/01/2021 1.54* 0.90 - 1.10 Final   • PTT 03/01/2021 46.9* 25.6 - 35.3 seconds Final    Note new Reference Range   • Platelets 03/01/2021 194  140 - 450 10*3/mm3 Final   Procedure " visit on 02/26/2021   Component Date Value Ref Range Status   • Color 02/26/2021 Yellow  Yellow, Straw, Dark Yellow, Marcy Final   • Clarity, UA 02/26/2021 Clear  Clear Final   • Specific Gravity  02/26/2021 1.025  1.005 - 1.030 Final   • pH, Urine 02/26/2021 5.5  5.0 - 8.0 Final   • Leukocytes 02/26/2021 Negative  Negative Final   • Nitrite, UA 02/26/2021 Negative  Negative Final   • Protein, POC 02/26/2021 1+* Negative mg/dL Final   • Glucose, UA 02/26/2021 3+* Negative, 1000 mg/dL (3+) mg/dL Final   • Ketones, UA 02/26/2021 Negative  Negative Final   • Urobilinogen, UA 02/26/2021 1 E.U./dL * Normal Final   • Bilirubin 02/26/2021 1 mg/dL* Negative Final   • Blood, UA 02/26/2021 Negative  Negative Final   Office Visit on 02/25/2021   Component Date Value Ref Range Status   • WBC 02/25/2021 5.6  3.4 - 10.8 x10E3/uL Final   • RBC 02/25/2021 4.26  4.14 - 5.80 x10E6/uL Final   • Hemoglobin 02/25/2021 12.7* 13.0 - 17.7 g/dL Final   • Hematocrit 02/25/2021 38.6  37.5 - 51.0 % Final   • MCV 02/25/2021 91  79 - 97 fL Final   • MCH 02/25/2021 29.8  26.6 - 33.0 pg Final   • MCHC 02/25/2021 32.9  31.5 - 35.7 g/dL Final   • RDW 02/25/2021 17.0* 11.6 - 15.4 % Final   • Platelets 02/25/2021 195  150 - 450 x10E3/uL Final   • Neutrophil Rel % 02/25/2021 71  Not Estab. % Final   • Lymphocyte Rel % 02/25/2021 15  Not Estab. % Final   • Monocyte Rel % 02/25/2021 10  Not Estab. % Final   • Eosinophil Rel % 02/25/2021 3  Not Estab. % Final   • Basophil Rel % 02/25/2021 1  Not Estab. % Final   • Neutrophils Absolute 02/25/2021 3.9  1.4 - 7.0 x10E3/uL Final   • Lymphocytes Absolute 02/25/2021 0.8  0.7 - 3.1 x10E3/uL Final   • Monocytes Absolute 02/25/2021 0.5  0.1 - 0.9 x10E3/uL Final   • Eosinophils Absolute 02/25/2021 0.2  0.0 - 0.4 x10E3/uL Final   • Basophils Absolute 02/25/2021 0.1  0.0 - 0.2 x10E3/uL Final   • Immature Granulocyte Rel % 02/25/2021 0  Not Estab. % Final   • Immature Grans Absolute 02/25/2021 0.0  0.0 - 0.1 x10E3/uL  Final   • AFP Tumor Marker 02/25/2021 4.0  0.0 - 8.3 ng/mL Final    Comment: [employer] Electrochemiluminescence Immunoassay (ECLIA)  Values obtained with different assay methods or kits cannot be  used interchangeably.  Results cannot be interpreted as absolute  evidence of the presence or absence of malignant disease.  This test is not interpretable in pregnant females.     • INR 02/25/2021 1.6* 0.9 - 1.2 Final    Comment: Reference interval is for non-anticoagulated patients.  Suggested INR therapeutic range for Vitamin K  antagonist therapy:     Standard Dose (moderate intensity                    therapeutic range):       2.0 - 3.0     Higher intensity therapeutic range       2.5 - 3.5     • Protime 02/25/2021 16.9* 9.1 - 12.0 sec Final   • Glucose 02/25/2021 133* 65 - 99 mg/dL Final   • BUN 02/25/2021 17  8 - 27 mg/dL Final   • Creatinine 02/25/2021 2.18* 0.76 - 1.27 mg/dL Final   • eGFR Non  Am 02/25/2021 29* >59 mL/min/1.73 Final   • eGFR African Am 02/25/2021 33* >59 mL/min/1.73 Final   • BUN/Creatinine Ratio 02/25/2021 8* 10 - 24 Final   • Sodium 02/25/2021 139  134 - 144 mmol/L Final   • Potassium 02/25/2021 4.6  3.5 - 5.2 mmol/L Final   • Chloride 02/25/2021 108* 96 - 106 mmol/L Final   • Total CO2 02/25/2021 19* 20 - 29 mmol/L Final   • Calcium 02/25/2021 9.1  8.6 - 10.2 mg/dL Final   • Total Protein 02/25/2021 6.5  6.0 - 8.5 g/dL Final   • Albumin 02/25/2021 2.5* 3.7 - 4.7 g/dL Final   • Globulin 02/25/2021 4.0  1.5 - 4.5 g/dL Final   • A/G Ratio 02/25/2021 0.6* 1.2 - 2.2 Final   • Total Bilirubin 02/25/2021 0.9  0.0 - 1.2 mg/dL Final   • Alkaline Phosphatase 02/25/2021 485* 39 - 117 IU/L Final   • AST (SGOT) 02/25/2021 91* 0 - 40 IU/L Final   • ALT (SGPT) 02/25/2021 26  0 - 44 IU/L Final       Assessment/Plan     Visit Diagnoses:    ICD-10-CM ICD-9-CM   1. Cirrhosis of liver with ascites, unspecified hepatic cirrhosis type (CMS/HCC)  K74.60 571.5    R18.8    2. Other ascites  R18.8  789.59       Plan:  I will obtain a CMP today to see if his creatinine has improved in order to place him on diuretics to help control the ascites.  Placement of an ostomy bag over the puncture site from previous paracentesis to catch fluid from previous paracentesis.  * Surgery not found *      MEDICATION ISSUES:  Discussed medication options and treatment plan of prescribed medication as well as the risks, benefits, and side effects including potential falls, possible impaired driving and metabolic adversities among others. Patient is agreeable to call the office with any worsening of symptoms or onset of side effects. Patient is agreeable to call 911 or go to the nearest ER should he/she begin having SI/HI.     MEDS ORDERED DURING VISIT:  No orders of the defined types were placed in this encounter.      No follow-ups on file.             This document has been electronically signed by Jen Meija MD   March 4, 2021 09:08 EST      Part of this note may be an electronic transcription/translation of spoken language to printed text using the Dragon Dictation System.

## 2021-03-05 NOTE — PROGRESS NOTES
Your labs show that your kidney function is a bit worse than it was 8 days ago.  I would like to send you to a kidney doctor for management.  My office will make the referral.

## 2021-03-11 PROBLEM — K76.7 HEPATORENAL SYNDROME (HCC): Status: ACTIVE | Noted: 2021-01-01

## 2021-03-11 PROBLEM — Z00.00 HEALTHCARE MAINTENANCE: Status: RESOLVED | Noted: 2019-06-06 | Resolved: 2021-01-01

## 2021-03-11 NOTE — ED NOTES
Pt gone to CT at this time, will achieve blood work upon return.     Kailyn Haile  03/10/21 3800

## 2021-03-11 NOTE — THERAPY EVALUATION
Acute Care - Physical Therapy Initial Evaluation   [facility]     Patient Name: Aaron Delgado  : 1945  MRN: [id number]  Today's Date: 3/11/2021   Onset of Illness/Injury or Date of Surgery: 03/10/21 (admit date)       PT Assessment (last 12 hours)      PT Evaluation and Treatment     Row Name 21 1108          Physical Therapy Time and Intention    Subjective Information  complains of;weakness;fatigue;dizziness  -CT     Document Type  evaluation  -CT     Mode of Treatment  physical therapy  -CT     Patient Effort  good  -CT     Symptoms Noted During/After Treatment  fatigue;dizziness  -CT     Comment  Pt tolerated evaluation fairly well with rest breaks provided as needed. Pt c/o dizziness upon standing. Pt able to ambulate 3 ft forward and back before having to sit.  -CT     Row Name 21 1101          General Information    Patient Profile Reviewed  yes  -CT     Onset of Illness/Injury or Date of Surgery  03/10/21 admit date  -CT     Referring Physician  Nunez   -CT     Patient Observations  alert;cooperative;agree to therapy  -CT     Prior Level of Function  independent:;min assist:  -CT     Equipment Currently Used at Home  walker, rolling  -CT     Existing Precautions/Restrictions  fall;oxygen therapy device and L/min  -CT     Limitations/Impairments  safety/cognitive  -CT     Equipment Issued to Patient  gait belt  -CT     Risks Reviewed  patient:;LOB;nausea/vomiting;dizziness;increased discomfort;change in vital signs;increased drainage;lines disloged  -CT     Benefits Reviewed  patient:;improve function;increase independence;increase strength;increase balance;decrease pain;decrease risk of DVT;improve skin integrity;increase knowledge  -CT     Barriers to Rehab  medically complex  -CT     Row Name 21 1108          Previous Level of Function/Home Environm    Household Ambulation, Premorbid Functional Level  needs assistive device for safe performance  -CT     Community Ambulation,  Premorbid Functional Level  needs assistive device for safe performance  -CT     Row Name 03/11/21 1108          Living Environment    Current Living Arrangements  home/apartment/condo  -CT     Row Name 03/11/21 1108          Home Use of Assistive/Adaptive Equipment    Equipment Currently Used at Home  walker, rolling  -CT     Row Name 03/11/21 1108          Cognition    Affect/Mental Status (Cognitive)  WFL  -CT     Orientation Status (Cognition)  oriented x 3  -CT     Follows Commands (Cognition)  WFL  -CT     Row Name 03/11/21 1108          Pain    Additional Documentation  -- no pain reported  -CT     Row Name 03/11/21 1108          Range of Motion Comprehensive    Comment, General Range of Motion  BLE grossly WFL  -CT     Row Name 03/11/21 1108          Strength Comprehensive (MMT)    Comment, General Manual Muscle Testing (MMT) Assessment  BLE grossly 3+/5  -CT     Row Name 03/11/21 1108          Bed Mobility    Bed Mobility  bed mobility (all) activities  -CT     All Activities, [place] (Bed Mobility)  minimum assist (75% patient effort)  -CT     Bed Mobility, Safety Issues  decreased use of arms for pushing/pulling;decreased use of legs for bridging/pushing  -CT     Assistive Device (Bed Mobility)  bed rails  -CT     Row Name 03/11/21 1108          Transfers    Transfers  sit-stand transfer;stand-sit transfer  -CT     Sit-Stand [place] (Transfers)  minimum assist (75% patient effort)  -CT     Stand-Sit [place] (Transfers)  minimum assist (75% patient effort)  -CT     Row Name 03/11/21 1108          Sit-Stand Transfer    Assistive Device (Sit-Stand Transfers)  walker, front-wheeled  -CT     Row Name 03/11/21 1108          Stand-Sit Transfer    Assistive Device (Stand-Sit Transfers)  walker, front-wheeled  -CT     Row Name 03/11/21 1108          Gait/Stairs (Locomotion)    [place] Level (Gait)  minimum assist (75% patient effort)  -CT     Assistive Device (Gait)  walker, front-wheeled  -CT      Distance in Feet (Gait)  3 ft forward and back  -CT     Pattern (Gait)  step-to  -CT     Deviations/Abnormal Patterns (Gait)  base of support, narrow;gait speed decreased  -CT     Bilateral Gait Deviations  forward flexed posture  -CT     Comment (Gait/Stairs)  pt c/o dizziness with ambulation  -CT     Row Name 03/11/21 1108          Safety Issues, Functional Mobility    Impairments Affecting Function (Mobility)  balance;strength;endurance/activity tolerance  -CT     Row Name 03/11/21 1108          Balance    Balance Assessment  sitting static balance;standing static balance  -CT     Static Sitting Balance  WFL  -CT     Static Standing Balance  mild impairment  -CT     Row Name             Wound 03/11/21 0230 Left lower arm Skin Tear    Wound - Properties Group Placement Date: 03/11/21  -SC Placement Time: 0230  -SC Present on Hospital Admission: Y  -SC Side: Left  -SC Orientation: lower  -SC Location: arm  -SC Primary Wound Type: Skin tear  -SC    Retired Wound - Properties Group Date first assessed: 03/11/21  -SC Time first assessed: 0230  -SC Present on Hospital Admission: Y  -SC Side: Left  -SC Location: arm  -SC Primary Wound Type: Skin tear  -SC    Row Name             Wound 03/11/21 1053 Right lower arm Skin Tear    Wound - Properties Group Placement Date: 03/11/21  -JJ Placement Time: 1053  -JJ Present on Hospital Admission: Y  -JJ Side: Right  -JJ Orientation: lower  -JJ Location: arm  -JJ Primary Wound Type: Skin tear  -JJ    Retired Wound - Properties Group Date first assessed: 03/11/21  -JJ Time first assessed: 1053  -JJ Present on Hospital Admission: Y  -JJ Side: Right  -JJ Location: arm  -JJ Primary Wound Type: Skin tear  -JJ    Row Name             Wound 03/11/21 1054 Right distal arm Skin Tear    Wound - Properties Group Placement Date: 03/11/21  -JJ Placement Time: 1054  -JJ Present on Hospital Admission: Y  -JJ Side: Right  -JJ Orientation: distal  -JJ Location: arm  -JJ Primary Wound Type: Skin  tear  -JJ    Retired Wound - Properties Group Date first assessed: 03/11/21  -JJ Time first assessed: 1054 -JJ Present on Hospital Admission: Y  -JJ Side: Right  -JJ Location: arm  -JJ Primary Wound Type: Skin tear  -JJ    Row Name 03/11/21 1108          Plan of Care Review    Plan of Care Reviewed With  patient  -CT     Row Name 03/11/21 1108          Physical Therapy Goals    Bed Mobility Goal Selection (PT)  bed mobility, PT goal 1  -CT     Transfer Goal Selection (PT)  transfer, PT goal 1  -CT     Gait Training Goal Selection (PT)  gait training, PT goal 1  -CT     Row Name 03/11/21 1108          Bed Mobility Goal 1 (PT)    Activity/Assistive Device (Bed Mobility Goal 1, PT)  bed mobility activities, all  -CT     [place] Level/Cues Needed (Bed Mobility Goal 1, PT)  standby assist  -CT     Time Frame (Bed Mobility Goal 1, PT)  by discharge  -CT     Row Name 03/11/21 1108          Transfer Goal 1 (PT)    Activity/Assistive Device (Transfer Goal 1, PT)  sit-to-stand/stand-to-sit;bed-to-chair/chair-to-bed  -CT     [place] Level/Cues Needed (Transfer Goal 1, PT)  standby assist  -CT     Time Frame (Transfer Goal 1, PT)  by discharge  -CT     Row Name 03/11/21 1108          Gait Training Goal 1 (PT)    Activity/Assistive Device (Gait Training Goal 1, PT)  gait (walking locomotion);assistive device use  -CT     [place] Level (Gait Training Goal 1, PT)  standby assist  -CT     Distance (Gait Training Goal 1, PT)  60  -CT     Time Frame (Gait Training Goal 1, PT)  by discharge  -CT     Row Name 03/11/21 1108          Positioning and Restraints    Pre-Treatment Position  in bed  -CT     Post Treatment Position  bed  -CT     In Bed  sitting EOB;call light within reach;encouraged to call for assist;notified nsg  -CT     Row Name 03/11/21 1108          Therapy Assessment/Plan (PT)    Patient/Family Therapy Goals Statement (PT)  Pt goals are to improve mobility  -CT     Functional Level at Time of Evaluation  (PT)  Min A   -CT     PT Diagnosis (PT)  decreased functional mobility  -CT     Rehab Potential (PT)  good, to achieve stated therapy goals  -CT     Criteria for Skilled Interventions Met (PT)  yes;skilled treatment is necessary  -CT     Predicted Duration of Therapy Intervention (PT)  length of stay  -CT     Activity Limitations Related to Problem List (PT)  unable to ambulate safely  -CT     Row Name 03/11/21 1108          Therapy Plan Review/Discharge Plan (PT)    Therapy Plan Review (PT)  evaluation/treatment results reviewed;care plan/treatment goals reviewed;risks/benefits reviewed;current/potential barriers reviewed;participants voiced agreement with care plan;participants included;patient  -CT       User Key  (r) = Recorded By, (t) = Taken By, (c) = Cosigned By    Initials Name Provider Type    CT Mandy Galindo PT Physical Therapist    Alana Luis, RN Registered Nurse    Selina Bull RN Registered Nurse        Physical Therapy Education                 Title: PT OT SLP Therapies (Done)     Topic: Physical Therapy (Done)     Point: Mobility training (Done)     Learning Progress Summary           Patient Acceptance, E,TB, VU by CT at 3/11/2021 1115                   Point: Home exercise program (Done)     Learning Progress Summary           Patient Acceptance, E,TB, VU by CT at 3/11/2021 1115                   Point: Body mechanics (Done)     Learning Progress Summary           Patient Acceptance, E,TB, VU by CT at 3/11/2021 1115                   Point: Precautions (Done)     Learning Progress Summary           Patient Acceptance, E,TB, VU by CT at 3/11/2021 1115                               User Key     Initials Effective Dates Name Provider Type Discipline    CT 04/03/18 -  Mandy Galindo PT Physical Therapist PT              PT Recommendation and Plan  Anticipated Discharge Disposition (PT): home with assist  Planned Therapy Interventions (PT): balance training, gait training, bed mobility  training, home exercise program, manual therapy techniques, motor coordination training, neuromuscular re-education, patient/family education, postural re-education, strengthening, transfer training  Therapy Frequency (PT): 3 times/wk (3-5 times/wk )  Plan of Care Reviewed With: patient       Time Calculation:   PT Charges     Row Name 03/11/21 1115             Time Calculation    PT Received On  03/11/21  -CT      PT Goal Re-Cert Due Date  03/25/21  -CT        User Key  (r) = Recorded By, (t) = Taken By, (c) = Cosigned By    Initials Name Provider Type    CT Mandy Galindo, PT Physical Therapist        Therapy Charges for Today     Code Description Service Date Service Provider Modifiers Qty    21878513120 HC PT EVAL MOD COMPLEXITY 4 3/11/2021 Mandy Galindo, PT GP 1               Mandy Galindo, PT  3/11/2021

## 2021-03-11 NOTE — PLAN OF CARE
Goal Outcome Evaluation:  Plan of Care Reviewed With: patient  Progress: improving  Outcome Summary: VSS and afebrile. Slightly hypotensive since arriving from ER. Call light in reach. Bed alarm set.  No complaints at this time.

## 2021-03-11 NOTE — PROGRESS NOTES
Case Management/Social Work    Patient Name:  Aaron Delgado  YOB: 1945  MRN: [id number]  Admit Date:  3/10/2021        SS to follow up with Pt on 3/12/21.       Electronically signed by:  Kristin Jenkins  03/11/21 16:39 EST

## 2021-03-11 NOTE — H&P
[facility]    Patient Identification:  Name:  Aaron Delgado  Age:  75 y.o.  Sex:  male  :  1945  MRN:  [id number]   Visit Number:  [id number]  Admit Date: 3/10/2021   Room number:  P204/S2  Primary Care Physician:  Dillan Nur MD    Date of Admission: 3/10/2021     Subjective     Chief complaint:    Chief Complaint   Patient presents with   • Hypotension     History of presenting illness:  75 y.o. male who was admitted on 3/10/2021 from the ED with shortness of air and drop in his blood pressure.  The patient has known nonalcoholic steatotic hepatitis; he had his first ever paracentesis on 3/1/2021, at which time 11.6 L of fluid was removed.  Despite this, patient continues have dyspnea on exertion without a cough.  The patient then had an appointment with Dr. Etienne on 3/10/2021; he was instructed by Dr. Etienne to come to the hospital if his blood pressures dropped.  Patient went home and noted that his blood pressure had dropped to 78/40.  Thus, patient was brought to the emergency department.  In the emergency department, the patient was noted to have a creatinine of 4.12; when he had blood work on 3/4/2021, his creatinine was 2.33.  Also, in 2021, the patient's creatinine was 1.7.  Because of the patient's complicated medical history, lactic acid of 3.3, and acute kidney injury, the patient was admitted to the progressive care unit for further evaluation and treatment.    Of note, the patient states that he has had dyspnea on exertion without a cough and without a fever for at least the last 2 weeks.  He also had a cystoscopy via our urologist on 2021, at which time only benign prostatic hypertrophy was found.  The patient states that ever since his cystoscopy that he is noted decreased urine output.    The patient states that he has not gained weight recently.  Since his paracentesis a week and a half ago, he has not gained weight  and his abdomen has not increased in size.  He currently states his abdomen is half the size as it was prior to the paracentesis.  ---------------------------------------------------------------------------------------------------------------------   Review of Systems   Constitutional: Negative for chills, diaphoresis, fever and unexpected weight change (Wife weighs him every morning; no change in weight since paracentesis).   HENT: Negative for congestion and rhinorrhea.    Eyes: Negative for discharge and redness.   Respiratory: Positive for shortness of breath (Dyspnea on exertion). Negative for cough and choking.    Cardiovascular: Negative for chest pain and leg swelling.   Gastrointestinal: Negative for diarrhea, nausea and vomiting.   Genitourinary: Positive for decreased urine volume. Negative for dysuria and hematuria.   Musculoskeletal: Negative for arthralgias and myalgias.   Skin: Negative for pallor, rash and wound.   Neurological: Positive for weakness (Bilateral leg weakness, like his legs give out), light-headedness and numbness. Negative for syncope.   Psychiatric/Behavioral: Negative for agitation, behavioral problems and confusion.     ---------------------------------------------------------------------------------------------------------------------   Past Medical History:   Diagnosis Date   • Arthritis    • Coronary artery disease    • Diastolic CHF (CMS/HCC)    • GERD (gastroesophageal reflux disease)    • Hyperlipidemia    • Hypertension    • Hypothyroidism due to Hashimoto's thyroiditis 9/25/2020   • Non-alcoholic cirrhosis (CMS/HCC)    • PAF (paroxysmal atrial fibrillation) (CMS/HCC)    • Ptosis of eyelid, bilateral    • Type 2 diabetes mellitus (CMS/HCC)    • Vitamin B12 deficiency      Past Surgical History:   Procedure Laterality Date   • CARDIAC SURGERY     • CHOLECYSTECTOMY     • COLONOSCOPY  01/2020    Dr. Fine- tubular adenomatous colon polyps x2   • CORONARY ARTERY BYPASS GRAFT      • ENDOSCOPY     • ENDOSCOPY N/A 8/17/2020    Procedure: ESOPHAGOGASTRODUODENOSCOPY WITH BIOPSY CPT CODE: 19467;  Surgeon: Andrew Barnett MD;  Location: [facility];  Service: Gastroenterology;  Laterality: N/A;     Family History   Problem Relation Age of Onset   • No Known Problems Father    • No Known Problems Mother      Social History     Socioeconomic History   • Marital status:    Tobacco Use   • Smoking status: Never Smoker   • Smokeless tobacco: Never Used   Vaping Use   • Vaping Use: Never used   Substance and Sexual Activity   • Alcohol use: No   • Drug use: No   • Sexual activity: Defer     ---------------------------------------------------------------------------------------------------------------------   Allergies:  Patient has no known allergies.  ---------------------------------------------------------------------------------------------------------------------   Medications below are reported home medications pulling from within the system; at this time, these medications have not been reconciled unless otherwise specified and are in the verification process for further verifcation as current home medications.      Prior to Admission Medications     Prescriptions Last Dose Informant Patient Reported? Taking?    amiodarone (PACERONE) 200 MG tablet   No No    Take 1 tablet by mouth Daily.    benazepril (LOTENSIN) 40 MG tablet   No No    TAKE 1 TABLET DAILY (WILL REPLACE THE 20 MG DOSE)    cyanocobalamin injection 1,000 mcg   No No    doxazosin (CARDURA) 4 MG tablet   No No    Take 1 tablet by mouth Every Night.    ELIQUIS 5 MG tablet tablet   No No    TAKE 1 TABLET EVERY 12 HOURS    Empagliflozin-Linaglip-Metform (Trijardy XR) 25-5-1000 MG tablet sustained-release 24 hour   No No    Take 0.5 tablets by mouth Every Morning.    finasteride (PROSCAR) 5 MG tablet   No No    Take 1 tablet by mouth Daily.    hydroCHLOROthiazide (HYDRODIURIL) 12.5 MG tablet   No No    Take 1 tablet by  mouth Every Morning.    levothyroxine (SYNTHROID, LEVOTHROID) 137 MCG tablet   No No    Take 1 tablet by mouth Daily.    metoprolol succinate XL (TOPROL-XL) 50 MG 24 hr tablet   No No    TAKE 1 TABLET EVERY NIGHT    nystatin (MYCOSTATIN) 072249 UNIT/GM cream   No No    Apply  topically to the appropriate area as directed As Needed (rasg).    propafenone (RYTHMOL) 225 MG tablet   No No    Take 1 tablet by mouth Every 8 (Eight) Hours.    rosuvastatin (CRESTOR) 20 MG tablet   No No    TAKE 1 TABLET EVERY NIGHT    sildenafil (VIAGRA) 100 MG tablet   No No    Take 1 tablet by mouth Daily As Needed for Erectile Dysfunction.    tamsulosin (FLOMAX) 0.4 MG capsule 24 hr capsule   No No    Take 1 capsule by mouth Daily.    vitamin B-12 (CYANOCOBALAMIN) 1000 MCG tablet   No No    Take 1 tablet by mouth Daily.    Zoster Vac Recomb Adjuvanted (Shingrix) 50 MCG/0.5ML reconstituted suspension   No No    Inject 0.5 mL into the appropriate muscle as directed by prescriber See Admin Instructions. Repeat in 2-6 months        Objective     Vital Signs:  Temp:  [97.8 °F (36.6 °C)-98.2 °F (36.8 °C)] 97.8 °F (36.6 °C)  Heart Rate:  [62-75] 66  Resp:  [18] 18  BP: ()/(48-94) 96/59    Mean Arterial Pressure (Non-Invasive) for the past 24 hrs (Last 3 readings):   Noninvasive MAP (mmHg)   03/11/21 0302 74   03/11/21 0150 74   03/11/21 0122 64     SpO2:  [95 %-100 %] 96 %  Device (Oxygen Therapy): room air  Body mass index is 26.03 kg/m².    Wt Readings from Last 3 Encounters:   03/11/21 80 kg (176 lb 4.8 oz)   03/04/21 86.2 kg (190 lb)   03/01/21 86.5 kg (190 lb 12.8 oz)      ----------------------------------------------------------------------------------------------------------------------  Physical Exam  Vitals reviewed.   Constitutional:       General: He is in acute distress.      Appearance: He is well-developed. He is not ill-appearing.      Interventions: Nasal cannula in place.      Comments: Chronically ill-appearing.   HENT:       Head: Normocephalic and atraumatic.      Right Ear: External ear normal.      Left Ear: External ear normal.      Nose: Nose normal.   Eyes:      General: No scleral icterus.     Extraocular Movements: Extraocular movements intact.      Pupils: Pupils are equal, round, and reactive to light.   Cardiovascular:      Rate and Rhythm: Normal rate and regular rhythm.      Pulses: Normal pulses.      Heart sounds: No murmur.   Pulmonary:      Effort: Respiratory distress present.      Breath sounds: No stridor. No wheezing or rales.   Abdominal:      General: Abdomen is protuberant. Bowel sounds are normal. There is distension.      Palpations: Abdomen is soft. There is shifting dullness and fluid wave.      Tenderness: There is no abdominal tenderness.   Musculoskeletal:         General: Swelling (less than 1+ pititng edema) present. No deformity or signs of injury.   Skin:     General: Skin is warm.      Capillary Refill: Capillary refill takes less than 2 seconds.      Coloration: Skin is not jaundiced.      Findings: No bruising or rash.   Neurological:      General: No focal deficit present.      Mental Status: He is alert and oriented to person, place, and time. Mental status is at baseline.      Cranial Nerves: No cranial nerve deficit.   Psychiatric:         Mood and Affect: Mood normal.         Behavior: Behavior normal. Behavior is cooperative.         Thought Content: Thought content normal.         Judgment: Judgment normal.     ---------------------------------------------------------------------------------------------------------------------  EKG:  NS with heart rate of of 65 and QTc 476 ms; there are diffuse T wave changes that were present on a comparison EKG dated 1/5/2021 but different than the comparison dtaed 12/10/2020.  Please note that I have personally looked at the EKG from this admission, the comparison EKGs in the medical records, and the above is my interpretation of this admission's EKG;  I await the final cardiology read.    Telemetry:  NS with heart rates in the 60's.  Please note that I personally looked at the telemetry strips.    Last echocardiogram:  Results for orders placed during the hospital encounter of 10/01/19    Adult Transthoracic Echo Complete W/ Cont if Necessary Per Protocol    Interpretation Summary  · Normal left ventricular cavity size and wall thickness noted. All left ventricular wall segments contract normally.  · Left ventricular diastolic dysfunction (grade I) consistent with impaired relaxation.  · Estimated EF appears to be in the range of 61 - 65%.  · The aortic valve is structurally normal. No aortic valve regurgitation is present. No aortic valve stenosis is present.  · The mitral valve is abnormal in structure. Mild thickening and calcification of the anterior and posterior mitral leaflets.. Mild mitral valve regurgitation is present. No significant mitral valve stenosis is present.  · The tricuspid valve is normal. No evidence of tricuspid valve stenosis is present. No tricuspid valve regurgitation is present.  · There is no evidence of pericardial effusion.    I have personally read the ECHO final report.  --------------------------------------------------------------------------------------------------------------------  LABS:    CBC and coagulation:  Results from last 7 days   Lab Units 03/11/21  0006 03/10/21  2100 03/10/21  1940   LACTATE mmol/L 3.1* 3.3*  --    SED RATE mm/hr  --   --  35*   CRP mg/dL  --   --  12.85*   WBC 10*3/mm3  --   --  7.91   HEMOGLOBIN g/dL  --   --  12.9*   HEMATOCRIT %  --   --  40.6   MCV fL  --   --  92.5   MCHC g/dL  --   --  31.8   PLATELETS 10*3/mm3  --   --  212   INR   --   --  2.71*       Renal and electrolytes:  Results from last 7 days   Lab Units 03/10/21  1940 03/04/21  0921   SODIUM mmol/L 132* 138   POTASSIUM mmol/L 5.5* 4.4   MAGNESIUM mg/dL 2.4  --    CHLORIDE mmol/L 103 107   TOTAL CO2 mmol/L  --  21.5*   CO2 mmol/L  14.9*  --    BUN mg/dL 56* 24*   CREATININE mg/dL 4.12* 2.33*   EGFR IF NONAFRICN AM mL/min/1.73 14* 28*   EGFR IF AFRICN AM mL/min/1.73  --  33*   CALCIUM mg/dL 8.8 8.7   GLUCOSE mg/dL 175*  --      Estimated Creatinine Clearance: 17.8 mL/min (A) (by C-G formula based on SCr of 4.12 mg/dL (H)).    Liver and pancreatic function:  Results from last 7 days   Lab Units 03/10/21  2100 03/10/21  1940 03/04/21  0921   ALBUMIN g/dL  --  2.02* 2.20*   BILIRUBIN mg/dL  --  0.9 0.8   ALK PHOS U/L  --  414* 388*   AST (SGOT) U/L  --  91* 72*   ALT (SGPT) U/L  --  39 29   AMMONIA umol/L 32  --   --    LIPASE U/L  --  103*  --      Endocrine function:  Lab Results   Component Value Date    HGBA1C 6.20 (H) 01/13/2021       Lab Results   Component Value Date    TSH 14.740 (H) 03/10/2021    FREET4 1.57 03/10/2021     Cardiac:  Results from last 7 days   Lab Units 03/10/21  2206 03/10/21  1940   CK TOTAL U/L  --  43   TROPONIN T ng/mL <0.010 <0.010   PROBNP pg/mL  --  938.7*       Cultures:  Lab Results   Component Value Date    COLORU Dark Yellow (A) 03/10/2021    CLARITYU Turbid (A) 03/10/2021    SPECGRAV 1.025 02/26/2021    PHUR <=5.0 03/10/2021    PROTEINUR NEGATIVE 02/04/2019    GLUCOSEU >=1000 mg/dL (3+) (A) 03/10/2021    KETONESU Trace (A) 03/10/2021    BLOODU Trace (A) 03/10/2021    NITRITEU Negative 03/10/2021    LEUKOCYTESUR Small (1+) (A) 03/10/2021    BILIRUBINUR Small (1+) (A) 03/10/2021    UROBILINOGEN 1.0 E.U./dL 03/10/2021    RBCUA 0-2 03/10/2021    WBCUA 13-20 (A) 03/10/2021    BACTERIA 4+ (A) 03/10/2021     Microbiology Results (last 10 days)     Procedure Component Value - Date/Time    COVID-19 and FLU A/B PCR - Swab, Nasopharynx [[id number]]  (Normal) Collected: 03/11/21 0052    Lab Status: Final result Specimen: Swab from Nasopharynx Updated: 03/11/21 0117     COVID19 Not Detected     Influenza A PCR Not Detected     Influenza B PCR Not Detected    Narrative:      Fact sheet for providers:  https://www.fda.gov/media/984048/download    Fact sheet for patients: https://www.fda.gov/media/160968/download    Test performed by PCR.            I have personally looked at the labs and they are summarized above.  ----------------------------------------------------------------------------------------------------------------------  Detailed radiology reports for the last 24 hours:    Imaging Results (Last 24 Hours)     Procedure Component Value Units Date/Time    CT Chest Without Contrast Diagnostic [[id number]] Collected: 03/10/21 2301     Updated: 03/10/21 2304    Narrative:      CT Abdomen Pelvis WO, CT Chest WO    INDICATION:   Shortness of breath, chest pain    TECHNIQUE:   CT of the chest, abdomen and pelvis without IV contrast. Coronal and sagittal reconstructions were obtained.  Radiation dose reduction techniques included automated exposure control or exposure modulation based on body size. Count of known CT and cardiac  nuc med studies performed in previous 12 months: 0.     COMPARISON:   Nonavailable for comparison    FINDINGS:  Chest: There is a right pleural effusion with right basilar consolidation/atelectasis. The lungs are otherwise clear. No evidence of mediastinal lymphadenopathy. No acute osseous abnormality.    Abdomen: There is cirrhosis with ascites and portal hypertension with tortuous vessels. There is gross ascites within the abdomen and pelvis. The stomach is grossly distended.    Pelvis: There is a small fluid containing umbilical hernia. There is diverticulosis. The prostate is enlarged. There is a large fluid/ascites containing inguinal hernia. Air is diverticulosis. There is no acute osseous abnormality.      Impression:      1. There is a right pleural effusion at the lung base with associated consolidation/atelectasis. There may be potential underlying infection/pneumonia.  2. There is no definite acute intra-abdominal abnormality. No definite acute intra-abdominal inflammatory  process is appreciated. There is cirrhosis with portal hypertension and gross ascites.          Signer Name: Shala Milligan MD   Signed: 3/10/2021 11:01 PM   Workstation Name: [id number]    [facility]    CT Abdomen Pelvis Without Contrast [[id number]] Collected: 03/10/21 2301     Updated: 03/10/21 2304    Narrative:      CT Abdomen Pelvis WO, CT Chest WO    INDICATION:   Shortness of breath, chest pain    TECHNIQUE:   CT of the chest, abdomen and pelvis without IV contrast. Coronal and sagittal reconstructions were obtained.  Radiation dose reduction techniques included automated exposure control or exposure modulation based on body size. Count of known CT and cardiac  nuc med studies performed in previous 12 months: 0.     COMPARISON:   Nonavailable for comparison    FINDINGS:  Chest: There is a right pleural effusion with right basilar consolidation/atelectasis. The lungs are otherwise clear. No evidence of mediastinal lymphadenopathy. No acute osseous abnormality.    Abdomen: There is cirrhosis with ascites and portal hypertension with tortuous vessels. There is gross ascites within the abdomen and pelvis. The stomach is grossly distended.    Pelvis: There is a small fluid containing umbilical hernia. There is diverticulosis. The prostate is enlarged. There is a large fluid/ascites containing inguinal hernia. Air is diverticulosis. There is no acute osseous abnormality.      Impression:      1. There is a right pleural effusion at the lung base with associated consolidation/atelectasis. There may be potential underlying infection/pneumonia.  2. There is no definite acute intra-abdominal abnormality. No definite acute intra-abdominal inflammatory process is appreciated. There is cirrhosis with portal hypertension and gross ascites.          Signer Name: Shala Milligan MD   Signed: 3/10/2021 11:01 PM   Workstation Name: [id number]    [facility]        I have personally  looked at the radiology images and I have read the available final reports.    Assessment & Plan       -Acute kidney injury, suspect due to fluid shifts from recent high volume paracentesis and low blood pressures  -Systemic inflammatory response syndrome with no acute infection found (lactic acid 3.3, CRP 12.85)  -Hyponatremia, suspect due to cirrhosis  -Normocytic anemia, suspect anemia of chronic disease  -Lactic acidosis, suspect dehydration  -Prolonged QTC of 476 ms  -History of nonalcoholic steatotic hepatitis with portal hypertension and ascites that is currently decompensated  -History of diastolic CHF, currently without an exacerbation  -History of coronary artery disease status post CABG in the distant past  -History of essential hypertension  -History of hypothyroidism  -History of hyperlipidemia  -History of paroxysmal atrial fibrillation, currently in normal sinus rhythm  -History of type 2 diabetes mellitus    He was admitted to the progressive care unit secondary to the hypotension.  For the acute kidney injury, we will give him IV fluids and consult nephrology.  We will also obtain a renal ultrasound.  We will monitor his blood pressures closely and if his mean arterial blood pressure drops below 65mmHg then we may need to give him some IV fluids.  The patient technically meets severe sepsis criteria as the lactic acid is 3.3 and the CRP is 12.85; however, I do not suspect an acute infection at this time.  The patient is at risk for spontaneous bacterial peritonitis and thus I have started him on empiric cefepime and Flagyl; normally to prevent spontaneous bacterial peritonitis, I will place the patient on a single agent like Rocephin but in light of the patient meeting severe sepsis criteria I decided to use the antibiotics as suggested for an abdominal infection on our sepsis protocol.  We will trend the lactic acid level to see if it decreases with IV fluids.  We will avoid QT prolonging agents  and continue to monitor the electrolytes closely; we will replace the potassium and magnesium per our protocols if these levels are low during her hospitalization.  Goal potassium level is 4-4.5 and goal magnesium level is 2-2.2.  We will trend the electrolytes closely.  We will monitor the input and output every 6 hours due to the patient's history of diastolic CHF.  I await the home medicine reconciliation to be performed.  We will continue with the patient's Eliquis for now.  I will not give him his oral diabetic medication but rather perform bedside glucose monitoring 4 times a day and trending the glucose levels to see if the patient will require sliding scale NovoLog.  We will repeat the patient's blood work in the morning.  Please note that I have also consulted PT, OT, speech, and social work as I suspect that the patient may not have enough help at home for his myriad of illnesses.  Also, no family members are listed in the patient's chart and we need to locate family members if the patient cannot make medical decisions on his own.      VTE Prophylaxis:   Mechanical Order History:     None      Pharmalogical Order History:      Ordered     Dose Route Frequency Stop    03/11/21 0222   home Eliquis    5 mg  p.o.  twice a day --                Iveth Nunez MD  [facility]  03/11/21  04:20 EST

## 2021-03-11 NOTE — THERAPY EVALUATION
Acute Care - Occupational Therapy Initial Evaluation   [place]     Patient Name: Aaron Delgado  : 1945  MRN: [id number]  Today's Date: 3/11/2021             Admit Date: 3/10/2021       ICD-10-CM ICD-9-CM   1. Hepatorenal syndrome (CMS/HCC)  K76.7 572.4   2. Weakness  R53.1 780.79   3. Hypotension, unspecified hypotension type  I95.9 458.9   4. Acute renal failure superimposed on chronic kidney disease, unspecified CKD stage, unspecified acute renal failure type (CMS/HCC)  N17.9 584.9    N18.9 585.9   5. Cirrhosis of liver with ascites, unspecified hepatic cirrhosis type (CMS/HCC)  K74.60 571.5    R18.8      Patient Active Problem List   Diagnosis   • Type 2 diabetes mellitus with peripheral neuropathy (CMS/HCC)   • Mixed hyperlipidemia   • Essential hypertension   • Psoriasis   • History of nephrolithiasis   • Gastroesophageal reflux disease without esophagitis   • History of adenomatous polyp of colon   • Erectile dysfunction   • B12 deficiency   • Paroxysmal atrial fibrillation (CMS/HCC)   • Ptosis of both eyelids   • Chronic right shoulder pain   • Vertigo   • NAFLD (nonalcoholic fatty liver disease)   • DDD (degenerative disc disease), lumbar   • Hypothyroidism due to Hashimoto's thyroiditis   • Encounter for immunization   • Gait instability   • Coronary artery disease involving native coronary artery of native heart without angina pectoris   • Hepatorenal syndrome (CMS/HCC)     Past Medical History:   Diagnosis Date   • Arthritis    • Ascites    • Benign prostatic hyperplasia    • Coronary artery disease    • Diastolic CHF (CMS/HCC)    • GERD (gastroesophageal reflux disease)    • Hyperlipidemia    • Hypertension    • Hypothyroidism due to Hashimoto's thyroiditis 2020   • Non-alcoholic cirrhosis (CMS/HCC)    • PAF (paroxysmal atrial fibrillation) (CMS/HCC)    • Portal hypertension (CMS/HCC)    • Ptosis of eyelid, bilateral    • Type 2 diabetes mellitus (CMS/HCC)    • Vitamin B12 deficiency       Past Surgical History:   Procedure Laterality Date   • CARDIAC SURGERY     • CHOLECYSTECTOMY     • COLONOSCOPY  01/2020    Dr. Fine- tubular adenomatous colon polyps x2   • CORONARY ARTERY BYPASS GRAFT     • ENDOSCOPY     • ENDOSCOPY N/A 8/17/2020    Procedure: ESOPHAGOGASTRODUODENOSCOPY WITH BIOPSY CPT CODE: 68305;  Surgeon: Andrew Barnett MD;  Location: [facility];  Service: Gastroenterology;  Laterality: N/A;            OT ASSESSMENT FLOWSHEET (last 12 hours)      OT Evaluation and Treatment     Row Name 03/11/21 1051                   OT Time and Intention    Document Type  evaluation  -KR        Mode of Treatment  occupational therapy  -KR        Patient Effort  good  -KR           General Information    General Observations of Patient  alert/cooperative  -KR           Cognition    Affect/Mental Status (Cognitive)  WFL  -KR        Orientation Status (Cognition)  oriented x 3  -KR        Follows Commands (Cognition)  WFL  -KR           Range of Motion Comprehensive    Comment, General Range of Motion  BUE 4/5  -KR           Strength Comprehensive (MMT)    Comment, General Manual Muscle Testing (MMT) Assessment  BUE 3-/5  -KR           Activities of Daily Living    BADL Assessment/Intervention  bathing;upper body dressing;lower body dressing;grooming;feeding;toileting  -KR           Bathing Assessment/Intervention    [place] Level (Bathing)  bathing skills;moderate assist (50% patient effort)  -KR           Upper Body Dressing Assessment/Training    [place] Level (Upper Body Dressing)  upper body dressing skills;minimum assist (75% patient effort)  -KR           Lower Body Dressing Assessment/Training    [place] Level (Lower Body Dressing)  lower body dressing skills;moderate assist (50% patient effort)  -KR           Grooming Assessment/Training    [place] Level (Grooming)  grooming skills;minimum assist (75% patient effort)  -KR           Self-Feeding Assessment/Training     [place] Level (Feeding)  feeding skills;set up  -KR           Toileting Assessment/Training    [place] Level (Toileting)  toileting skills;moderate assist (50% patient effort)  -KR           Wound 03/11/21 0230 Left lower arm Skin Tear    Wound - Properties Group Placement Date: 03/11/21  -SC Placement Time: 0230  -SC Present on Hospital Admission: Y  -SC Side: Left  -SC Orientation: lower  -SC Location: arm  -SC Primary Wound Type: Skin tear  -SC    Retired Wound - Properties Group Date first assessed: 03/11/21  -SC Time first assessed: 0230  -SC Present on Hospital Admission: Y  -SC Side: Left  -SC Location: arm  -SC Primary Wound Type: Skin tear  -SC       Wound 03/11/21 1053 Right lower arm Skin Tear    Wound - Properties Group Placement Date: 03/11/21  -JJ Placement Time: 1053  -JJ Present on Hospital Admission: Y  -JJ Side: Right  -JJ Orientation: lower  -JJ Location: arm  -JJ Primary Wound Type: Skin tear  -JJ    Retired Wound - Properties Group Date first assessed: 03/11/21  -JJ Time first assessed: 1053  -JJ Present on Hospital Admission: Y  -JJ Side: Right  -JJ Location: arm  -JJ Primary Wound Type: Skin tear  -JJ       Wound 03/11/21 1054 Right distal arm Skin Tear    Wound - Properties Group Placement Date: 03/11/21  -JJ Placement Time: 1054  -JJ Present on Hospital Admission: Y  -JJ Side: Right  -JJ Orientation: distal  -JJ Location: arm  -JJ Primary Wound Type: Skin tear  -JJ    Retired Wound - Properties Group Date first assessed: 03/11/21  -JJ Time first assessed: 1054  -JJ Present on Hospital Admission: Y  -JJ Side: Right  -JJ Location: arm  -JJ Primary Wound Type: Skin tear  -JJ       Plan of Care Review    Plan of Care Reviewed With  patient  -KR           OT Goals    Strength Goal Selection (OT)  strength, OT goal 1  -KR        Activity Tolerance Goal Selection (OT)  activity tolerance, OT goal 1  -KR           Strength Goal 1 (OT)    Strength Goal 1 (OT)  BUE increase x 1 to enhance  self care skills  -KR        Time Frame (Strength Goal 1, OT)  by discharge  -KR            Activity Tolerance Goal 1 (OT)    Activity Tolerance Goal 1 (OT)  Increase to enhance self care  -KR        Activity Level (Endurance Goal 1, OT)  15 min activity  -KR        Time Frame (Activity Tolerance Goal 1, OT)  by discharge  -KR           Patient Education Goal (OT)    Activity (Patient Education Goal, OT)  AE/DME training to enhance safety/independence at home  -KR        Nance/Cues/Accuracy (Memory Goal 2, OT)  verbalizes understanding  -KR        Time Frame (Patient Education Goal, OT)  by discharge  -KR           Therapy Assessment/Plan (OT)    Rehab Potential (OT)  good, to achieve stated therapy goals  -KR        Criteria for Skilled Therapeutic Interventions Met (OT)  yes  -KR        Planned Therapy Interventions (OT)  activity tolerance training;adaptive equipment training  -KR           Therapy Plan Review/Discharge Plan (OT)    Anticipated Discharge Disposition (OT)  home with assist  -KR          User Key  (r) = Recorded By, (t) = Taken By, (c) = Cosigned By    Initials Name Effective Dates    KR Stevenson Anderson OT 04/03/18 -     Alana Luis, RN 12/08/20 -     Selina Bull RN 07/07/20 -                OT Recommendation and Plan  Planned Therapy Interventions (OT): activity tolerance training, adaptive equipment training  Plan of Care Review  Plan of Care Reviewed With: patient  Plan of Care Reviewed With: patient        Time Calculation:     Therapy Charges for Today     Code Description Service Date Service Provider Modifiers Qty    [id number]  OT EVAL MOD COMPLEXITY 4 3/11/2021 Stevenson Anderson OT GO 1               Stevenson Anderson OT  3/11/2021

## 2021-03-11 NOTE — PAYOR COMM NOTE
"CONTACT:  HAYLEY VELARDE MSN, APRN  UTILIZATION MANAGEMENT DEPT.  [facility]  [address] [facility], [address]  PHONE:  [phone]  FAX: [phone]    CLINICAL FOR INPATIENT AUTHORIZATION REQUEST.    PENDING REFERENCE # [id number]      Aaron Delgado (75 y.o. Male)     Date of Birth Social Security Number Address Home Phone MRN    1945  [address] [place] [address] [phone] [id number]    Restorationist Marital Status          None        Admission Date Admission Type Admitting Provider Attending Provider Department, Room/Bed    3/10/21 Emergency Iveth Nunez MD Mullins, Thomas Anthony, DO [facility] PROGRESS CARE, P204/S2    Discharge Date Discharge Disposition Discharge Destination                       Attending Provider: Ronaldo Christensen DO    Allergies: No Known Allergies    Isolation: None   Infection: None   Code Status: CPR    Ht: 175.3 cm (69\")   Wt: 80 kg (176 lb 4.8 oz)    Admission Cmt: None   Principal Problem: Hepatorenal syndrome (CMS/HCC) [K76.7]                 Active Insurance as of 3/10/2021     Primary Coverage     Payor Plan Insurance Group Employer/Plan Group    HUMANA MEDICARE REPLACEMENT HUMANA MEDICARE REPLACEMENT [id number]     Payor Plan Address Payor Plan Phone Number Payor Plan Fax Number Effective Dates    [address] [address] [phone]  2018 - None Entered    [facility] [id number]       Subscriber Name Subscriber Birth Date Member ID       AARON DELGADO 1945 [id number]                 Emergency Contacts      (Rel.) Home Phone Work Phone Mobile Phone    ANNETTE DELGADO (Spouse) [phone] -- --               History & Physical      Iveth Nunez MD at 21 0244              [facility] HOSPITALIST HISTORY AND PHYSICAL    Patient Identification:  Name:  Aaron Delgado  Age:  75 y.o.  Sex:  male  :  1945  MRN:  [id number]   Visit Number:  [id number]  Admit Date: 3/10/2021   Room " number:  P204/S2  Primary Care Physician:  Dillan Nur MD    Date of Admission: 3/10/2021     Subjective     Chief complaint:    Chief Complaint   Patient presents with   • Hypotension     History of presenting illness:  75 y.o. male who was admitted on 3/10/2021 from the ED with shortness of air and drop in his blood pressure.  The patient has known nonalcoholic steatotic hepatitis; he had his first ever paracentesis on 3/1/2021, at which time 11.6 L of fluid was removed.  Despite this, patient continues have dyspnea on exertion without a cough.  The patient then had an appointment with Dr. Etienne on 3/10/2021; he was instructed by Dr. Etienne to come to the hospital if his blood pressures dropped.  Patient went home and noted that his blood pressure had dropped to 78/40.  Thus, patient was brought to the emergency department.  In the emergency department, the patient was noted to have a creatinine of 4.12; when he had blood work on 3/4/2021, his creatinine was 2.33.  Also, in January 2021, the patient's creatinine was 1.7.  Because of the patient's complicated medical history, lactic acid of 3.3, and acute kidney injury, the patient was admitted to the progressive care unit for further evaluation and treatment.    Of note, the patient states that he has had dyspnea on exertion without a cough and without a fever for at least the last 2 weeks.  He also had a cystoscopy via our urologist on 2/26/2021, at which time only benign prostatic hypertrophy was found.  The patient states that ever since his cystoscopy that he is noted decreased urine output.    The patient states that he has not gained weight recently.  Since his paracentesis a week and a half ago, he has not gained weight and his abdomen has not increased in size.  He currently states his abdomen is half the size as it was prior to the  paracentesis.  ---------------------------------------------------------------------------------------------------------------------   Review of Systems   Constitutional: Negative for chills, diaphoresis, fever and unexpected weight change (Wife weighs him every morning; no change in weight since paracentesis).   HENT: Negative for congestion and rhinorrhea.    Eyes: Negative for discharge and redness.   Respiratory: Positive for shortness of breath (Dyspnea on exertion). Negative for cough and choking.    Cardiovascular: Negative for chest pain and leg swelling.   Gastrointestinal: Negative for diarrhea, nausea and vomiting.   Genitourinary: Positive for decreased urine volume. Negative for dysuria and hematuria.   Musculoskeletal: Negative for arthralgias and myalgias.   Skin: Negative for pallor, rash and wound.   Neurological: Positive for weakness (Bilateral leg weakness, like his legs give out), light-headedness and numbness. Negative for syncope.   Psychiatric/Behavioral: Negative for agitation, behavioral problems and confusion.     ---------------------------------------------------------------------------------------------------------------------   Past Medical History:   Diagnosis Date   • Arthritis    • Coronary artery disease    • Diastolic CHF (CMS/HCC)    • GERD (gastroesophageal reflux disease)    • Hyperlipidemia    • Hypertension    • Hypothyroidism due to Hashimoto's thyroiditis 9/25/2020   • Non-alcoholic cirrhosis (CMS/HCC)    • PAF (paroxysmal atrial fibrillation) (CMS/HCC)    • Ptosis of eyelid, bilateral    • Type 2 diabetes mellitus (CMS/HCC)    • Vitamin B12 deficiency      Past Surgical History:   Procedure Laterality Date   • CARDIAC SURGERY     • CHOLECYSTECTOMY     • COLONOSCOPY  01/2020    Dr. Fine- tubular adenomatous colon polyps x2   • CORONARY ARTERY BYPASS GRAFT     • ENDOSCOPY     • ENDOSCOPY N/A 8/17/2020    Procedure: ESOPHAGOGASTRODUODENOSCOPY WITH BIOPSY CPT CODE: 21516;   Surgeon: Andrew Barnett MD;  Location: [facility];  Service: Gastroenterology;  Laterality: N/A;     Family History   Problem Relation Age of Onset   • No Known Problems Father    • No Known Problems Mother      Social History     Socioeconomic History   • Marital status:    Tobacco Use   • Smoking status: Never Smoker   • Smokeless tobacco: Never Used   Vaping Use   • Vaping Use: Never used   Substance and Sexual Activity   • Alcohol use: No   • Drug use: No   • Sexual activity: Defer     ---------------------------------------------------------------------------------------------------------------------   Allergies:  Patient has no known allergies.  ---------------------------------------------------------------------------------------------------------------------   Medications below are reported home medications pulling from within the system; at this time, these medications have not been reconciled unless otherwise specified and are in the verification process for further verifcation as current home medications.      Prior to Admission Medications     Prescriptions Last Dose Informant Patient Reported? Taking?    amiodarone (PACERONE) 200 MG tablet   No No    Take 1 tablet by mouth Daily.    benazepril (LOTENSIN) 40 MG tablet   No No    TAKE 1 TABLET DAILY (WILL REPLACE THE 20 MG DOSE)    cyanocobalamin injection 1,000 mcg   No No    doxazosin (CARDURA) 4 MG tablet   No No    Take 1 tablet by mouth Every Night.    ELIQUIS 5 MG tablet tablet   No No    TAKE 1 TABLET EVERY 12 HOURS    Empagliflozin-Linaglip-Metform (Trijardy XR) 25-5-1000 MG tablet sustained-release 24 hour   No No    Take 0.5 tablets by mouth Every Morning.    finasteride (PROSCAR) 5 MG tablet   No No    Take 1 tablet by mouth Daily.    hydroCHLOROthiazide (HYDRODIURIL) 12.5 MG tablet   No No    Take 1 tablet by mouth Every Morning.    levothyroxine (SYNTHROID, LEVOTHROID) 137 MCG tablet   No No    Take 1 tablet by mouth  Daily.    metoprolol succinate XL (TOPROL-XL) 50 MG 24 hr tablet   No No    TAKE 1 TABLET EVERY NIGHT    nystatin (MYCOSTATIN) 195946 UNIT/GM cream   No No    Apply  topically to the appropriate area as directed As Needed (rasg).    propafenone (RYTHMOL) 225 MG tablet   No No    Take 1 tablet by mouth Every 8 (Eight) Hours.    rosuvastatin (CRESTOR) 20 MG tablet   No No    TAKE 1 TABLET EVERY NIGHT    sildenafil (VIAGRA) 100 MG tablet   No No    Take 1 tablet by mouth Daily As Needed for Erectile Dysfunction.    tamsulosin (FLOMAX) 0.4 MG capsule 24 hr capsule   No No    Take 1 capsule by mouth Daily.    vitamin B-12 (CYANOCOBALAMIN) 1000 MCG tablet   No No    Take 1 tablet by mouth Daily.    Zoster Vac Recomb Adjuvanted (Shingrix) 50 MCG/0.5ML reconstituted suspension   No No    Inject 0.5 mL into the appropriate muscle as directed by prescriber See Admin Instructions. Repeat in 2-6 months        Objective     Vital Signs:  Temp:  [97.8 °F (36.6 °C)-98.2 °F (36.8 °C)] 97.8 °F (36.6 °C)  Heart Rate:  [62-75] 66  Resp:  [18] 18  BP: ()/(48-94) 96/59    Mean Arterial Pressure (Non-Invasive) for the past 24 hrs (Last 3 readings):   Noninvasive MAP (mmHg)   03/11/21 0302 74   03/11/21 0150 74   03/11/21 0122 64     SpO2:  [95 %-100 %] 96 %  Device (Oxygen Therapy): room air  Body mass index is 26.03 kg/m².    Wt Readings from Last 3 Encounters:   03/11/21 80 kg (176 lb 4.8 oz)   03/04/21 86.2 kg (190 lb)   03/01/21 86.5 kg (190 lb 12.8 oz)      ----------------------------------------------------------------------------------------------------------------------  Physical Exam  Vitals reviewed.   Constitutional:       General: He is in acute distress.      Appearance: He is well-developed. He is not ill-appearing.      Interventions: Nasal cannula in place.      Comments: Chronically ill-appearing.   HENT:      Head: Normocephalic and atraumatic.      Right Ear: External ear normal.      Left Ear: External ear  normal.      Nose: Nose normal.   Eyes:      General: No scleral icterus.     Extraocular Movements: Extraocular movements intact.      Pupils: Pupils are equal, round, and reactive to light.   Cardiovascular:      Rate and Rhythm: Normal rate and regular rhythm.      Pulses: Normal pulses.      Heart sounds: No murmur.   Pulmonary:      Effort: Respiratory distress present.      Breath sounds: No stridor. No wheezing or rales.   Abdominal:      General: Abdomen is protuberant. Bowel sounds are normal. There is distension.      Palpations: Abdomen is soft. There is shifting dullness and fluid wave.      Tenderness: There is no abdominal tenderness.   Musculoskeletal:         General: Swelling (less than 1+ pititng edema) present. No deformity or signs of injury.   Skin:     General: Skin is warm.      Capillary Refill: Capillary refill takes less than 2 seconds.      Coloration: Skin is not jaundiced.      Findings: No bruising or rash.   Neurological:      General: No focal deficit present.      Mental Status: He is alert and oriented to person, place, and time. Mental status is at baseline.      Cranial Nerves: No cranial nerve deficit.   Psychiatric:         Mood and Affect: Mood normal.         Behavior: Behavior normal. Behavior is cooperative.         Thought Content: Thought content normal.         Judgment: Judgment normal.     ---------------------------------------------------------------------------------------------------------------------  EKG:  NS with heart rate of of 65 and QTc 476 ms; there are diffuse T wave changes that were present on a comparison EKG dated 1/5/2021 but different than the comparison dtaed 12/10/2020.  Please note that I have personally looked at the EKG from this admission, the comparison EKGs in the medical records, and the above is my interpretation of this admission's EKG; I await the final cardiology read.    Telemetry:  NS with heart rates in the 60's.  Please note that I  personally looked at the telemetry strips.    Last echocardiogram:  Results for orders placed during the hospital encounter of 10/01/19    Adult Transthoracic Echo Complete W/ Cont if Necessary Per Protocol    Interpretation Summary  · Normal left ventricular cavity size and wall thickness noted. All left ventricular wall segments contract normally.  · Left ventricular diastolic dysfunction (grade I) consistent with impaired relaxation.  · Estimated EF appears to be in the range of 61 - 65%.  · The aortic valve is structurally normal. No aortic valve regurgitation is present. No aortic valve stenosis is present.  · The mitral valve is abnormal in structure. Mild thickening and calcification of the anterior and posterior mitral leaflets.. Mild mitral valve regurgitation is present. No significant mitral valve stenosis is present.  · The tricuspid valve is normal. No evidence of tricuspid valve stenosis is present. No tricuspid valve regurgitation is present.  · There is no evidence of pericardial effusion.    I have personally read the ECHO final report.  --------------------------------------------------------------------------------------------------------------------    I have personally looked at the radiology images and I have read the available final reports.    Assessment & Plan       -Acute kidney injury, suspect due to fluid shifts from recent high volume paracentesis and low blood pressures  -Systemic inflammatory response syndrome with no acute infection found (lactic acid 3.3, CRP 12.85)  -Hyponatremia, suspect due to cirrhosis  -Normocytic anemia, suspect anemia of chronic disease  -Lactic acidosis, suspect dehydration  -Prolonged QTC of 476 ms  -History of nonalcoholic steatotic hepatitis with portal hypertension and ascites that is currently decompensated  -History of diastolic CHF, currently without an exacerbation  -History of coronary artery disease status post CABG in the distant past  -History of  essential hypertension  -History of hypothyroidism  -History of hyperlipidemia  -History of paroxysmal atrial fibrillation, currently in normal sinus rhythm  -History of type 2 diabetes mellitus    He was admitted to the progressive care unit secondary to the hypotension.  For the acute kidney injury, we will give him IV fluids and consult nephrology.  We will also obtain a renal ultrasound.  We will monitor his blood pressures closely and if his mean arterial blood pressure drops below 65mmHg then we may need to give him some IV fluids.  The patient technically meets severe sepsis criteria as the lactic acid is 3.3 and the CRP is 12.85; however, I do not suspect an acute infection at this time.  The patient is at risk for spontaneous bacterial peritonitis and thus I have started him on empiric cefepime and Flagyl; normally to prevent spontaneous bacterial peritonitis, I will place the patient on a single agent like Rocephin but in light of the patient meeting severe sepsis criteria I decided to use the antibiotics as suggested for an abdominal infection on our sepsis protocol.  We will trend the lactic acid level to see if it decreases with IV fluids.  We will avoid QT prolonging agents and continue to monitor the electrolytes closely; we will replace the potassium and magnesium per our protocols if these levels are low during her hospitalization.  Goal potassium level is 4-4.5 and goal magnesium level is 2-2.2.  We will trend the electrolytes closely.  We will monitor the input and output every 6 hours due to the patient's history of diastolic CHF.  I await the home medicine reconciliation to be performed.  We will continue with the patient's Eliquis for now.  I will not give him his oral diabetic medication but rather perform bedside glucose monitoring 4 times a day and trending the glucose levels to see if the patient will require sliding scale NovoLog.  We will repeat the patient's blood work in the morning.   Please note that I have also consulted PT, OT, speech, and social work as I suspect that the patient may not have enough help at home for his myriad of illnesses.  Also, no family members are listed in the patient's chart and we need to locate family members if the patient cannot make medical decisions on his own.      VTE Prophylaxis:   Mechanical Order History:     None      Pharmalogical Order History:      Ordered     Dose Route Frequency Stop    03/11/21 0222   home Eliquis    5 mg  p.o.  twice a day --                Iveth Nunez MD  [facility]  03/11/21  04:20 EST        Electronically signed by Iveth Nunez MD at 03/11/21 0609       Vital Signs (last day)     Date/Time   Temp   Temp src   Pulse   Resp   BP   Patient Position   SpO2    03/11/21 0732   --   --   64   --   95/66   --   96    03/11/21 0702   --   --   64   --   (!) 84/52   --   97    03/11/21 0623   --   --   63   --   (!) 87/46   Sitting   94    03/11/21 0621   --   --   63   --   (!) 81/45   Lying   93    03/11/21 0502   --   --   64   --   (!) 86/58   --   95    03/11/21 0302   --   --   66   18   96/59   --   96    03/11/21 0218   97.8 (36.6)   Oral   --   --   --   --   --    03/11/21 0155   --   --   64   --   --   --   97    03/11/21 0150   --   --   --   --   99/63   --   95    03/11/21 0149   --   --   65   --   --   --   96    03/11/21 0135   --   --   63   --   --   --   97    03/11/21 0122   --   --   --   --   96/53   --   98    03/11/21 0037   --   --   64   --   (!) 82/52   --   96    03/11/21 0022   --   --   63   --   (!) 87/48   --   95    03/10/21 2237   --   --   64   --   94/55   --   98    03/10/21 2222   --   --   63   --   96/56   --   97    03/10/21 2209   --   --   75   --   --   --   99    03/10/21 2208   --   --   --   --   97/59   --   98    03/10/21 2148   --   --   62   --   --   --   98    03/10/21 2144   --   --   69   --   --   --   98    03/10/21 2142   --   --   --   --    101/52   --   98    03/10/21 2129   --   --   64   --   --   --   98    03/10/21 2127   --   --   --   --   97/59   --   97    03/10/21 2112   --   --   65   --   (!) 87/49   --   98    03/10/21 2055   --   --   65   --   --   --   97    03/10/21 2050   --   --   65   --   --   --   99    03/10/21 2042   --   --   65   --   (!) 87/48   --   97    03/10/21 2027   --   --   --   --   92/56   --   99    03/10/21 2021   --   --   66   --   --   --   99    03/10/21 2014   --   --   64   --   --   --   98    03/10/21 2013   --   --   --   --   92/50   --   99    03/10/21 2010   --   --   64   --   --   --   98    03/10/21 1958   --   --   --   --   128/94   --   100    03/10/21 1952   --   --   --   --   --   --   98    03/10/21 1926   98.2 (36.8)   Infrared   65   18   (!) 82/49   Sitting   97              Intake & Output (last day)       03/10 0701 - 03/11 0700 03/11 0701 - 03/12 0700    I.V. (mL/kg) 349.1 (4.4)     IV Piggyback 2100     Total Intake(mL/kg) 2449.1 (30.6)     Net +2449.1                 Facility-Administered Medications as of 3/11/2021   Medication Dose Route Frequency Provider Last Rate Last Admin   • albumin human 25 % IV SOLN 12.5 g  12.5 g Intravenous TID With Meals Lucila Etienne MD       • apixaban (ELIQUIS) tablet 5 mg  5 mg Oral Q12H Iveth Nunez MD       • [COMPLETED] cefepime (MAXIPIME) 2 g/100 mL 0.9% NS (mbp)  2 g Intravenous Once Iveth Nunez  mL/hr at 03/11/21 0429 2 g at 03/11/21 0429   • [START ON 3/12/2021] cefepime (MAXIPIME) 2 g/100 mL 0.9% NS (mbp)  2 g Intravenous Q24H Iveth Nunez MD       • [COMPLETED] cefTRIAXone (ROCEPHIN) 2 g/100 mL 0.9% NS IVPB (MBP)  2 g Intravenous Once Elton Ascencio MD   Stopped at 03/11/21 0106   • dextrose (D50W) 25 g/ 50mL Intravenous Solution 25 g  25 g Intravenous Q15 Min PRN Iveth Nunez MD       • dextrose (GLUTOSE) oral gel 15 g  15 g Oral Q15 Min PRN Iveth Nunez MD       • glucagon (human recombinant)  (GLUCAGEN DIAGNOSTIC) injection 1 mg  1 mg Subcutaneous Q15 Min PRN Iveth Nunez MD       • metroNIDAZOLE (FLAGYL) 500 mg/100mL IVPB  500 mg Intravenous Q8H Iveth Nunez MD   500 mg at 03/11/21 0429   • nitroglycerin (NITROSTAT) SL tablet 0.4 mg  0.4 mg Sublingual Q5 Min PRN Iveth Nunez MD       • [COMPLETED] sodium chloride 0.9 % bolus 1,000 mL  1,000 mL Intravenous Once Elton Ascencio MD   Stopped at 03/10/21 2158   • [COMPLETED] sodium chloride 0.9 % bolus 1,000 mL  1,000 mL Intravenous Once Elton Ascencio MD   Stopped at 03/11/21 0128   • sodium chloride 0.9 % flush 10 mL  10 mL Intravenous PRN Iveth Nunez MD       • sodium chloride 0.9 % flush 3 mL  3 mL Intravenous Q12H Iveth Nunez MD       • sodium chloride 0.9 % flush 3-10 mL  3-10 mL Intravenous PRN Iveth Nunez MD       • sodium chloride 0.9 % infusion  100 mL/hr Intravenous Continuous Iveth Nunez  mL/hr at 03/11/21 0429 100 mL/hr at 03/11/21 0429     Lab Results (all)     Procedure Component Value Units Date/Time    Sodium, Urine, Random - Urine, Clean Catch [[id number]] Collected: 03/10/21 2125    Specimen: Urine, Clean Catch Updated: 03/11/21 0803    POC Glucose Once [[id number]]  (Normal) Collected: 03/11/21 0642    Specimen: Blood Updated: 03/11/21 0649     Glucose 75 mg/dL     COVID-19 and FLU A/B PCR - Swab, Nasopharynx [[id number]]  (Normal) Collected: 03/11/21 0052    Specimen: Swab from Nasopharynx Updated: 03/11/21 0117     COVID19 Not Detected     Influenza A PCR Not Detected     Influenza B PCR Not Detected    Narrative:      Fact sheet for providers: https://www.fda.gov/media/280863/download    Fact sheet for patients: https://www.fda.gov/media/964014/download    Test performed by PCR.    Timed Lactic Acid, Reflex [[id number]]  (Abnormal) Collected: 03/11/21 0006    Specimen: Blood from Arm, Right Updated: 03/11/21 0040     Lactate 3.1 mmol/L     Troponin [[id number]]   (Normal) Collected: 03/10/21 2206    Specimen: Blood from Arm, Right Updated: 03/10/21 2233     Troponin T <0.010 ng/mL     Narrative:      Troponin T Reference Range:  <= 0.03 ng/mL-   Negative for AMI  >0.03 ng/mL-     Abnormal for myocardial necrosis.  Clinicians would have to utilize clinical acumen, EKG, Troponin and serial changes to determine if it is an Acute Myocardial Infarction or myocardial injury due to an underlying chronic condition.       Results may be falsely decreased if patient taking Biotin.      Urinalysis, Microscopic Only - Urine, Clean Catch [[id number]]  (Abnormal) Collected: 03/10/21 2125    Specimen: Urine, Clean Catch Updated: 03/10/21 2227     RBC, UA 0-2 /HPF      WBC, UA 13-20 /HPF      Bacteria, UA 4+ /HPF      Squamous Epithelial Cells, UA 3-6 /HPF      Hyaline Casts, UA 0-2 /LPF      Granular Casts, UA 3-6 /LPF      Methodology Manual Light Microscopy    Urinalysis With Culture If Indicated - Urine, Clean Catch [[id number]]  (Abnormal) Collected: 03/10/21 2125    Specimen: Urine, Clean Catch Updated: 03/10/21 2227     Color, UA Dark Yellow     Appearance, UA Turbid     pH, UA <=5.0     Specific Gravity, UA 1.026     Glucose, UA >=1000 mg/dL (3+)     Ketones, UA Trace     Bilirubin, UA Small (1+)     Blood, UA Trace     Protein, UA 30 mg/dL (1+)     Leuk Esterase, UA Small (1+)     Nitrite, UA Negative     Urobilinogen, UA 1.0 E.U./dL    Urine Culture - Urine, Urine, Clean Catch [[id number]] Collected: 03/10/21 2125    Specimen: Urine, Clean Catch Updated: 03/10/21 2227    Blood Culture - Blood, Arm, Right [[id number]] Collected: 03/10/21 2206    Specimen: Blood from Arm, Right Updated: 03/10/21 2211    Urine Drug Screen - Urine, Clean Catch [[id number]]  (Normal) Collected: 03/10/21 2125    Specimen: Urine, Clean Catch Updated: 03/10/21 2149     Amphetamine Screen, Urine Negative     Barbiturates Screen, Urine Negative     Benzodiazepine Screen, Urine Negative     Cocaine Screen, Urine  Negative     Methadone Screen, Urine Negative     Opiate Screen Negative     Phencyclidine (PCP), Urine Negative     THC, Screen, Urine Negative     6-ACETYL MORPHINE Negative     Buprenorphine, Screen, Urine Negative     Oxycodone Screen, Urine Negative    Narrative:      Negative Thresholds For Drugs Screened:                  Amphetamines              1000 ng/ml               Barbiturates               200 ng/ml               Benzodiazepines            200 ng/ml              Cocaine                    300 ng/ml              Methadone                  300 ng/ml              Opiates                    300 ng/ml               Phencyclidine               25 ng/ml               THC                         50 ng/ml              6-Acetyl Morphine           10 ng/ml              Buprenorphine                5 ng/ml              Oxycodone                  300 ng/ml    The reference range for all drugs tested is negative. This report includes final unconfirmed qualitative results to be used for medical treatment purposes only. Unconfirmed results must not be used for non-medical purposes such as employment or legal testing. Clinical consideration should be applied to any drug of abuse test, especially when unconfirmed quantitative results are used.        Lactic Acid, Plasma [[id number]]  (Abnormal) Collected: 03/10/21 2100    Specimen: Blood from Arm, Right Updated: 03/10/21 2145     Lactate 3.3 mmol/L     Ammonia [[id number]]  (Normal) Collected: 03/10/21 2100    Specimen: Blood from Arm, Right Updated: 03/10/21 2132     Ammonia 32 umol/L     Blood Culture - Blood, Arm, Right [[id number]] Collected: 03/10/21 2100    Specimen: Blood from Arm, Right Updated: 03/10/21 2105    Procalcitonin [[id number]] Collected: 03/10/21 2100    Specimen: Blood from Arm, Right Updated: 03/10/21 2105    Troponin [[id number]]  (Normal) Collected: 03/10/21 1940    Specimen: Blood Updated: 03/10/21 2104     Troponin T <0.010 ng/mL     Narrative:       Troponin T Reference Range:  <= 0.03 ng/mL-   Negative for AMI  >0.03 ng/mL-     Abnormal for myocardial necrosis.  Clinicians would have to utilize clinical acumen, EKG, Troponin and serial changes to determine if it is an Acute Myocardial Infarction or myocardial injury due to an underlying chronic condition.       Results may be falsely decreased if patient taking Biotin.      BNP [[id number]]  (Abnormal) Collected: 03/10/21 1940    Specimen: Blood Updated: 03/10/21 2104     proBNP 938.7 pg/mL     Narrative:      Among patients with dyspnea, NT-proBNP is highly sensitive for the detection of acute congestive heart failure. In addition NT-proBNP of <300 pg/ml effectively rules out acute congestive heart failure with 99% negative predictive value.    Results may be falsely decreased if patient taking Biotin.      T4, Free [[id number]]  (Normal) Collected: 03/10/21 1940    Specimen: Blood Updated: 03/10/21 2104     Free T4 1.57 ng/dL     Narrative:      Results may be falsely increased if patient taking Biotin.      TSH [[id number]]  (Abnormal) Collected: 03/10/21 1940    Specimen: Blood Updated: 03/10/21 2104     TSH 14.740 uIU/mL     Protime-INR [[id number]]  (Abnormal) Collected: 03/10/21 1940    Specimen: Blood Updated: 03/10/21 2102     Protime 28.7 Seconds      Comment: Note new Reference Range        INR 2.71    Narrative:      Suggested INR therapeutic range for stable oral anticoagulant therapy:    Low Intensity therapy:   1.5-2.0  Moderate Intensity therapy:   2.0-3.0  High Intensity therapy:   2.5-4.0    aPTT [[id number]]  (Abnormal) Collected: 03/10/21 1940    Specimen: Blood Updated: 03/10/21 2102     PTT 57.5 seconds      Comment: Note new Reference Range       Narrative:      PTT Heparin Therapeutic Range:  59 - 95 seconds      Comprehensive Metabolic Panel [[id number]]  (Abnormal) Collected: 03/10/21 1940    Specimen: Blood Updated: 03/10/21 2102     Glucose 175 mg/dL      BUN 56 mg/dL      Creatinine  4.12 mg/dL      Sodium 132 mmol/L      Potassium 5.5 mmol/L      Chloride 103 mmol/L      CO2 14.9 mmol/L      Calcium 8.8 mg/dL      Total Protein 5.6 g/dL      Albumin 2.02 g/dL      ALT (SGPT) 39 U/L      AST (SGOT) 91 U/L      Alkaline Phosphatase 414 U/L      Total Bilirubin 0.9 mg/dL      eGFR Non African Amer 14 mL/min/1.73      Comment: <15 Indicative of kidney failure.        eGFR   Amer --     Comment: <15 Indicative of kidney failure.        Globulin 3.6 gm/dL      A/G Ratio 0.6 g/dL      BUN/Creatinine Ratio 13.6     Anion Gap 14.1 mmol/L     Narrative:      GFR Normal >60  Chronic Kidney Disease <60  Kidney Failure <15      CK [[id number]]  (Normal) Collected: 03/10/21 1940    Specimen: Blood Updated: 03/10/21 2102     Creatine Kinase 43 U/L     Lipase [[id number]]  (Abnormal) Collected: 03/10/21 1940    Specimen: Blood Updated: 03/10/21 2102     Lipase 103 U/L     Salicylate Level [[id number]]  (Normal) Collected: 03/10/21 1940    Specimen: Blood Updated: 03/10/21 2102     Salicylate <0.3 mg/dL     Ethanol [[id number]] Collected: 03/10/21 1940    Specimen: Blood Updated: 03/10/21 2102     Ethanol <10 mg/dL      Ethanol % <0.010 %     Narrative:      >/= 80.0 legally intoxicated    Acetaminophen Level [[id number]]  (Normal) Collected: 03/10/21 1940    Specimen: Blood Updated: 03/10/21 2102     Acetaminophen <5.0 mcg/mL     Magnesium [[id number]]  (Normal) Collected: 03/10/21 1940    Specimen: Blood Updated: 03/10/21 2102     Magnesium 2.4 mg/dL     C-reactive Protein [[id number]]  (Abnormal) Collected: 03/10/21 1940    Specimen: Blood Updated: 03/10/21 2102     C-Reactive Protein 12.85 mg/dL     Sedimentation Rate [[id number]]  (Abnormal) Collected: 03/10/21 1940    Specimen: Blood Updated: 03/10/21 2048     Sed Rate 35 mm/hr     [place] Draw [[id number]] Collected: 03/10/21 1940    Specimen: Blood Updated: 03/10/21 2045    Narrative:      The following orders were created for panel order [place]  Draw.  Procedure                               Abnormality         Status                     ---------                               -----------         ------                     Light Blue Top[[id number]]                                   Final result               Green Top (Gel)[[id number]]                                  Final result               Lavender Top[[id number]]                                     Final result               Gold Top - SST[[id number]]                                   Final result                 Please view results for these tests on the individual orders.    Green Top (Gel) [[id number]] Collected: 03/10/21 1940    Specimen: Blood Updated: 03/10/21 2045     Extra Tube Hold for add-ons.     Comment: Auto resulted.       Light Blue Top [[id number]] Collected: 03/10/21 1940    Specimen: Blood Updated: 03/10/21 2045     Extra Tube hold for add-on     Comment: Auto resulted       Lavender Top [[id number]] Collected: 03/10/21 1940    Specimen: Blood Updated: 03/10/21 2045     Extra Tube hold for add-on     Comment: Auto resulted       Gold Top - SST [[id number]] Collected: 03/10/21 1940    Specimen: Blood Updated: 03/10/21 2045     Extra Tube Hold for add-ons.     Comment: Auto resulted.       CBC & Differential [[id number]]  (Abnormal) Collected: 03/10/21 1940    Specimen: Blood Updated: 03/10/21 2040    Narrative:      The following orders were created for panel order CBC & Differential.  Procedure                               Abnormality         Status                     ---------                               -----------         ------                     CBC Auto Differential[[id number]]        Abnormal            Final result                 Please view results for these tests on the individual orders.    CBC Auto Differential [[id number]]  (Abnormal) Collected: 03/10/21 1940    Specimen: Blood Updated: 03/10/21 2040     WBC 7.91 10*3/mm3      RBC 4.39 10*6/mm3      Hemoglobin 12.9 g/dL       Hematocrit 40.6 %      MCV 92.5 fL      MCH 29.4 pg      MCHC 31.8 g/dL      RDW 20.6 %      RDW-SD 69.9 fl      MPV 10.6 fL      Platelets 212 10*3/mm3      Neutrophil % 77.9 %      Lymphocyte % 13.0 %      Monocyte % 7.3 %      Eosinophil % 0.9 %      Basophil % 0.4 %      Immature Grans % 0.5 %      Neutrophils, Absolute 6.16 10*3/mm3      Lymphocytes, Absolute 1.03 10*3/mm3      Monocytes, Absolute 0.58 10*3/mm3      Eosinophils, Absolute 0.07 10*3/mm3      Basophils, Absolute 0.03 10*3/mm3      Immature Grans, Absolute 0.04 10*3/mm3      nRBC 0.0 /100 WBC           Imaging Results (All)     Procedure Component Value Units Date/Time    CT Chest Without Contrast Diagnostic [[id number]] Collected: 03/10/21 2301     Updated: 03/10/21 2304    Narrative:      CT Abdomen Pelvis WO, CT Chest WO    INDICATION:   Shortness of breath, chest pain    TECHNIQUE:   CT of the chest, abdomen and pelvis without IV contrast. Coronal and sagittal reconstructions were obtained.  Radiation dose reduction techniques included automated exposure control or exposure modulation based on body size. Count of known CT and cardiac  nuc med studies performed in previous 12 months: 0.     COMPARISON:   Nonavailable for comparison    FINDINGS:  Chest: There is a right pleural effusion with right basilar consolidation/atelectasis. The lungs are otherwise clear. No evidence of mediastinal lymphadenopathy. No acute osseous abnormality.    Abdomen: There is cirrhosis with ascites and portal hypertension with tortuous vessels. There is gross ascites within the abdomen and pelvis. The stomach is grossly distended.    Pelvis: There is a small fluid containing umbilical hernia. There is diverticulosis. The prostate is enlarged. There is a large fluid/ascites containing inguinal hernia. Air is diverticulosis. There is no acute osseous abnormality.      Impression:      1. There is a right pleural effusion at the lung base with associated  consolidation/atelectasis. There may be potential underlying infection/pneumonia.  2. There is no definite acute intra-abdominal abnormality. No definite acute intra-abdominal inflammatory process is appreciated. There is cirrhosis with portal hypertension and gross ascites.          Signer Name: Shala Milligan MD   Signed: 3/10/2021 11:01 PM   Workstation Name: [id number]    [facility]    CT Abdomen Pelvis Without Contrast [[id number]] Collected: 03/10/21 2301     Updated: 03/10/21 2304    Narrative:      CT Abdomen Pelvis WO, CT Chest WO    INDICATION:   Shortness of breath, chest pain    TECHNIQUE:   CT of the chest, abdomen and pelvis without IV contrast. Coronal and sagittal reconstructions were obtained.  Radiation dose reduction techniques included automated exposure control or exposure modulation based on body size. Count of known CT and cardiac  nuc med studies performed in previous 12 months: 0.     COMPARISON:   Nonavailable for comparison    FINDINGS:  Chest: There is a right pleural effusion with right basilar consolidation/atelectasis. The lungs are otherwise clear. No evidence of mediastinal lymphadenopathy. No acute osseous abnormality.    Abdomen: There is cirrhosis with ascites and portal hypertension with tortuous vessels. There is gross ascites within the abdomen and pelvis. The stomach is grossly distended.    Pelvis: There is a small fluid containing umbilical hernia. There is diverticulosis. The prostate is enlarged. There is a large fluid/ascites containing inguinal hernia. Air is diverticulosis. There is no acute osseous abnormality.      Impression:      1. There is a right pleural effusion at the lung base with associated consolidation/atelectasis. There may be potential underlying infection/pneumonia.  2. There is no definite acute intra-abdominal abnormality. No definite acute intra-abdominal inflammatory process is appreciated. There is cirrhosis with portal  hypertension and gross ascites.          Signer Name: Shala Milligan MD   Signed: 3/10/2021 11:01 PM   Workstation Name: [id number]    [facility]          Orders (all)      Start     Ordered    03/11/21 0723  Skin Tear Care - Every 3 Days  Every Third Day     Comments: DO NOT APPLY TRANSPARENT DRESSING  - Gently Cleanse Area   - Pat Dry  - Apply Vaseline Gauze to Cover Entire Tear  - Wrap with Gauze Dressing  - Change Every 3 Days    03/11/21 0722    03/11/21 0723  Skin Tear Care - As Needed  Per Order Details     Comments: DO NOT APPLY TRANSPARENT DRESSING  - Gently Cleanse Area  - Pat Dry  - Apply Vaseline Gauze to Cover Entire Tear  - Wrap With Gauze Dressing  - Change As Needed For Strike Through Drainage    03/11/21 0722 03/11/21 0702  Inpatient Nephrology Consult  IN AM     Specialty:  Nephrology  Provider:  John Araujo MD    03/11/21 0347    03/11/21 0612  Inpatient Case Management  Consult  Once     Provider:  (Not yet assigned)    03/11/21 0611    03/11/21 0611  SLP Consult: Eval & Treat Swallow Disorder; Cardiac, Renal, Consistent Carbohydrate  Once     Comments: Confused, need to see if he can eat safely.    03/11/21 0610    03/11/21 0611  OT Consult: Eval & Treat  Once      03/11/21 0610    03/11/21 0600  Intake & Output  Every 6 Hours      03/11/21 0222    03/11/21 0439  Adult Transthoracic Echo Complete W/ Cont if Necessary Per Protocol  Once      03/11/21 0438    03/11/21 0439  Orthostatic Blood Pressure  Once      03/11/21 0438    03/11/21 0424  US Renal Bilateral  1 Time Imaging      03/11/21 0424    03/11/21 0347  PT Consult: Eval & Treat As Tolerated; Functional Mobility Below Baseline  Once      03/11/21 0346    03/11/21 0223  Vital Signs  Per Hospital Policy      03/11/21 0222    03/11/21 0223  Weigh Patient  Once      03/11/21 0222    03/11/21 0223  Oxygen Therapy- Nasal Cannula; Titrate for SPO2: 90% - 95%  Continuous      03/11/21 0222     03/11/21 0223  May Be Off Telemetry for Tests  Continuous      03/11/21 0222    03/11/21 0223  ACLS Protocol For Life Threatening Dysrhythmias (Unless Code Status Indicates Otherwise)  Continuous      03/11/21 0222    03/11/21 0223  Notify Provider if ACLS Protocol Activated  Until Discontinued      03/11/21 0222 03/11/21 0223  Cardiac Monitoring  Continuous      03/11/21 0222    03/11/21 0223  Pulse Oximetry, Continuous  Continuous      03/11/21 0222    03/11/21 0223  Activity - Strict Bed Rest  Until Discontinued      03/11/21 0222 03/11/21 0223  Fall Precautions  Continuous      03/11/21 0222 03/11/21 0223  Diet Clear Liquid  Diet Effective Now      03/11/21 0222 03/11/21 0222  Telemetry - Pulse Oximetry  Continuous PRN,   Status:  Canceled     Comments: If Patient Develops Unresponsiveness, Acute Dyspnea, Cyanosis or Suspected Hypoxemia Start Continuous Pulse Ox Monitoring, Apply Oxygen & Notify Provider    03/11/21 0222 03/11/21 0132  Code Status and Medical Interventions:  Continuous      03/11/21 0133    03/11/21 0132  Inpatient Admission  Once      03/11/21 0133    Unscheduled  Oxygen Therapy- Nasal Cannula; Titrate for SPO2: 90% - 95%  Continuous PRN     Comments: If Patient Develops Unresponsiveness, Acute Dyspnea, Cyanosis or Suspected Hypoxemia Start Continuous Pulse Ox Monitoring, Apply Oxygen & Notify Provider    03/11/21 0222

## 2021-03-11 NOTE — PROGRESS NOTES
Interim hospitalist note    Patient seen and examined at bedside.  Patient is in good spirits and has few complaints.  He reports his abdomen is distended but much less so than several weeks ago.  He denies any fevers, chills, sweats, rigors, dizziness, lightheadedness or any other symptoms at this time.  Note is made of persistently low blood pressures.  Likely secondary to intravascular depletion.  Will bolus normal saline 500 cc x 1 dose now.  We will also hold all antihypertensive medications at this time.  Appreciate nephrology recommendations and assistance with acute kidney injury.

## 2021-03-11 NOTE — ED NOTES
Pt leaving the ED at this time via stretcher with RN and ED tech. Pt is alert and oriented, respirations even and unlabored, VSS, NADN. Pt IVs patent and intact. Pt belongings being transferred with pt.      Jodi Jurado RN  03/11/21 6245

## 2021-03-11 NOTE — PLAN OF CARE
Goal Outcome Evaluation:  Plan of Care Reviewed With: patient, spouse  Progress: improving  Outcome Summary: VSS. SR. RA. Pt remains borderline hypotensive. Nephrology consulted and midodrine and albumin ordered. SLP saw the patient and diet order was placed. Home meds reordered. Skin tears on BUE redressed. No complaints. Will continue to monitor.

## 2021-03-11 NOTE — PHARMACY PATIENT ASSISTANCE
Pharmacy checked on cost of home med Eliquis. Patient receives a 90 day supply through mail order for $5.    Thank you,  Rosy Molina, PharmD

## 2021-03-11 NOTE — PROGRESS NOTES
Nephrology Progress Note      Subjective     I saw this patient in clinic yesterday as new patient visit for rise in Cr.   Pt was profoundly hypotensive and was taking 3 antihypertensives with BP in 70-80's systolic. He recently had abd USG that showed cirrhotic liver with evidence of portal hypertension. BP medications were held and I started him on midodrine and recommend to come back to ED if BP continue to be low or drops.   Pt mentioned he checked his BP in home and was 70mmHg so he decided to come to ED.   He feels relatively better, no shortness of breath, no dizziness    Objective       Vital signs :     Temp:  [97.5 °F (36.4 °C)-98.2 °F (36.8 °C)] 97.5 °F (36.4 °C)  Heart Rate:  [60-75] 63  Resp:  [16-18] 16  BP: ()/(45-94) 91/59      Intake/Output Summary (Last 24 hours) at 3/11/2021 0948  Last data filed at 3/11/2021 0907  Gross per 24 hour   Intake 2569.07 ml   Output 75 ml   Net 2494.07 ml       Physical Exam:    General Appearance : not in acute distress  Lungs : clear to auscultation, respirations regular  Heart :  regular rhythm & normal rate, normal S1, S2 and no murmur, no rub  Abdomen : mildly distended. normal bowel sounds, no masses, no hepatomegaly, no splenomegaly, soft non-tender and no guarding  Extremities : moves extremities well, trace edema, no cyanosis and no redness  Skin :  no bleeding, bruising or rash  Neurologic :   orientated to person, place, time and situation, Grossly no focal deficits      Laboratory Data :     Albumin Albumin   Date Value Ref Range Status   03/10/2021 2.02 (L) 3.50 - 5.20 g/dL Final      Magnesium Magnesium   Date Value Ref Range Status   03/10/2021 2.4 1.6 - 2.4 mg/dL Final          PTH               No results found for: PTH    CBC and coagulation:  Results from last 7 days   Lab Units 03/11/21  0006 03/10/21  2100 03/10/21  1940   LACTATE mmol/L 3.1* 3.3*  --    SED RATE mm/hr  --   --  35*   CRP mg/dL  --   --  12.85*   WBC 10*3/mm3  --   --  7.91    HEMOGLOBIN g/dL  --   --  12.9*   HEMATOCRIT %  --   --  40.6   MCV fL  --   --  92.5   MCHC g/dL  --   --  31.8   PLATELETS 10*3/mm3  --   --  212   INR   --   --  2.71*     Acid/base balance:      Renal and electrolytes:  Results from last 7 days   Lab Units 03/10/21  1940   SODIUM mmol/L 132*   POTASSIUM mmol/L 5.5*   MAGNESIUM mg/dL 2.4   CHLORIDE mmol/L 103   CO2 mmol/L 14.9*   BUN mg/dL 56*   CREATININE mg/dL 4.12*   EGFR IF NONAFRICN AM mL/min/1.73 14*   CALCIUM mg/dL 8.8     Estimated Creatinine Clearance: 17.8 mL/min (A) (by C-G formula based on SCr of 4.12 mg/dL (H)).    Liver and pancreatic function:  Results from last 7 days   Lab Units 03/10/21  2100 03/10/21  1940   ALBUMIN g/dL  --  2.02*   BILIRUBIN mg/dL  --  0.9   ALK PHOS U/L  --  414*   AST (SGOT) U/L  --  91*   ALT (SGPT) U/L  --  39   AMMONIA umol/L 32  --    LIPASE U/L  --  103*         Cardiac:  Results from last 7 days   Lab Units 03/10/21  1940   PROBNP pg/mL 938.7*     Liver and pancreatic function:  Results from last 7 days   Lab Units 03/10/21  2100 03/10/21  1940   ALBUMIN g/dL  --  2.02*   BILIRUBIN mg/dL  --  0.9   ALK PHOS U/L  --  414*   AST (SGOT) U/L  --  91*   ALT (SGPT) U/L  --  39   AMMONIA umol/L 32  --    LIPASE U/L  --  103*       Medications :     albumin human, 12.5 g, Intravenous, TID With Meals  apixaban, 5 mg, Oral, Q12H  [START ON 3/12/2021] cefepime, 2 g, Intravenous, Q24H  metroNIDAZOLE, 500 mg, Intravenous, Q8H  sodium chloride, 3 mL, Intravenous, Q12H      sodium chloride, 100 mL/hr, Last Rate: 100 mL/hr (03/11/21 7619)          Assessment/Plan   1. RHEA on CKD  2. Decompensated liver cirrhosis  3. Hyponatremia  4. Essential hypertension now with hypotension  5. DM-II    RHEA likely Likely 2/2 ATN or pre renal due to hemodynamic changes due to profound hypotension  DC'd all BP medications including Metop, ACEi, amiodaraon, amlodipine in clinic  With decompensated liver cirrhosis, HRS is also on differential  Will  send U Na, start on IV albumin and repeat sodium tomorrow, BP is persistently low, will add midodrine  Agree on prophylactic antibiotics for SBP        Lucila Etienne MD  03/11/21  09:48 EST

## 2021-03-11 NOTE — THERAPY EVALUATION
Acute Care - Speech Language Pathology   Swallow Initial Evaluation [facility]   CLINICAL DYSPHAGIA ASSESSMENT     Patient Name: Aaron Delgado  : 1945  MRN: [id number]  Today's Date: 3/11/2021             Admit Date: 3/10/2021    Aaron Delgado  is seen at bedside this AM on PCU to assess safety/efficacy of swallowing fnx, determine safest/least restrictive diet tolerance.     Mr. Delgado was admitted to [facility] via ED 2/2 SOA, renal stenosis, dyspnea. He is referred to r/o dsyphagia.     Social History     Socioeconomic History   • Marital status:      Spouse name: Not on file   • Number of children: Not on file   • Years of education: Not on file   • Highest education level: Not on file   Tobacco Use   • Smoking status: Never Smoker   • Smokeless tobacco: Never Used   Vaping Use   • Vaping Use: Never used   Substance and Sexual Activity   • Alcohol use: No   • Drug use: No   • Sexual activity: Defer      Imaging:   CT Abdomen Pelvis WO, CT Chest WO     INDICATION:   Shortness of breath, chest pain     TECHNIQUE:   CT of the chest, abdomen and pelvis without IV contrast. Coronal and sagittal reconstructions were obtained.  Radiation dose reduction techniques included automated exposure control or exposure modulation based on body size. Count of known CT and cardiac  nuc med studies performed in previous 12 months: 0.      COMPARISON:   Nonavailable for comparison     FINDINGS:  Chest: There is a right pleural effusion with right basilar consolidation/atelectasis. The lungs are otherwise clear. No evidence of mediastinal lymphadenopathy. No acute osseous abnormality.     Abdomen: There is cirrhosis with ascites and portal hypertension with tortuous vessels. There is gross ascites within the abdomen and pelvis. The stomach is grossly distended.     Pelvis: There is a small fluid containing umbilical hernia. There is diverticulosis. The prostate is enlarged. There is a large fluid/ascites containing inguinal  hernia. Air is diverticulosis. There is no acute osseous abnormality.     IMPRESSION:  1. There is a right pleural effusion at the lung base with associated consolidation/atelectasis. There may be potential underlying infection/pneumonia.  2. There is no definite acute intra-abdominal abnormality. No definite acute intra-abdominal inflammatory process is appreciated. There is cirrhosis with portal hypertension and gross ascites.              Signer Name: Shala Milligan MD   Signed: 3/10/2021 11:01 PM   Workstation Name: [id number]    [facility]    Labs:  03/11/21 0830  Sodium, Urine, Random - Urine, Clean Catch   Collected: 03/10/21 2125  Final result  Specimen: Urine, Clean Catch    Sodium, Urine <20 mmol/L            03/11/21 0649  POC Glucose Once   Collected: 03/11/21 0642  Final result  Specimen: Blood    Glucose 75 mg/dL            03/11/21 0040  Timed Lactic Acid, Reflex   Collected: 03/11/21 0006  Final result  Specimen: Blood from Arm, Right    Lactate 3.1High Critical  mmol          Diet Orders (active) (From admission, onward)     Start     Ordered    03/11/21 1001  Diet Regular; Thin  Diet Effective Now      03/11/21 1001              Mr. Delgado is positioned upright and centered in bed to accept multiple po presentations of ice chips, solid cracker, puree and thin liquids via spoon, cup and straw.  He is able to self feed.     Facial/oral structures are symmetrical upon observation. Lingual protrusion reveals no deviation. Oral mucosa are moist, pink, and clean. Secretions are clear, thin, and well controlled. OROM/ERNESTO is wfl to imitate oral postures. Gag is not assessed. Volitional cough is intact w/ adequate  intensity, clear in quality, non-productive. Voice is adequate in intensity, clear in quality w/ intelligible speech. He is a/a and interactive, oriented to himself and hospitalization, follows simple directives, participates in simple conversational exchanges.    Upon  po presentations, adequate bolus anticipation and acceptance w/ good labial seal for bolus clearance via spoon bowl, cup rim stability and suction via straw. Bolus formation, manipulation and control are wfl w/ rotary mastication pattern. A-p transit is timely w/o oral residue.No overt s/s aspiration before the swallow.      Pharyngeal swallow is timely w/ adequate hyolaryngeal elevation per palpation. No overt s/s aspiration evidenced across this evaluation. No silent aspiration suspected.He denies odynophagia.    Visit Dx:     ICD-10-CM ICD-9-CM   1. Hepatorenal syndrome (CMS/HCC)  K76.7 572.4   2. Weakness  R53.1 780.79   3. Hypotension, unspecified hypotension type  I95.9 458.9   4. Acute renal failure superimposed on chronic kidney disease, unspecified CKD stage, unspecified acute renal failure type (CMS/HCC)  N17.9 584.9    N18.9 585.9   5. Cirrhosis of liver with ascites, unspecified hepatic cirrhosis type (CMS/HCC)  K74.60 571.5    R18.8      Patient Active Problem List   Diagnosis   • Type 2 diabetes mellitus with peripheral neuropathy (CMS/HCC)   • Mixed hyperlipidemia   • Essential hypertension   • Psoriasis   • History of nephrolithiasis   • Gastroesophageal reflux disease without esophagitis   • History of adenomatous polyp of colon   • Erectile dysfunction   • B12 deficiency   • Paroxysmal atrial fibrillation (CMS/HCC)   • Ptosis of both eyelids   • Chronic right shoulder pain   • Vertigo   • NAFLD (nonalcoholic fatty liver disease)   • DDD (degenerative disc disease), lumbar   • Hypothyroidism due to Hashimoto's thyroiditis   • Encounter for immunization   • Gait instability   • Coronary artery disease involving native coronary artery of native heart without angina pectoris   • Hepatorenal syndrome (CMS/HCC)     Past Medical History:   Diagnosis Date   • Arthritis    • Ascites    • Benign prostatic hyperplasia    • Coronary artery disease    • Diastolic CHF (CMS/HCC)    • GERD (gastroesophageal reflux  disease)    • Hyperlipidemia    • Hypertension    • Hypothyroidism due to Hashimoto's thyroiditis 9/25/2020   • Non-alcoholic cirrhosis (CMS/HCC)    • PAF (paroxysmal atrial fibrillation) (CMS/HCC)    • Portal hypertension (CMS/HCC)    • Ptosis of eyelid, bilateral    • Type 2 diabetes mellitus (CMS/HCC)    • Vitamin B12 deficiency      Past Surgical History:   Procedure Laterality Date   • CARDIAC SURGERY     • CHOLECYSTECTOMY     • COLONOSCOPY  01/2020    Dr. Fine- tubular adenomatous colon polyps x2   • CORONARY ARTERY BYPASS GRAFT     • ENDOSCOPY     • ENDOSCOPY N/A 8/17/2020    Procedure: ESOPHAGOGASTRODUODENOSCOPY WITH BIOPSY CPT CODE: 70963;  Surgeon: Andrew Barnett MD;  [facility];  Service: Gastroenterology;  Laterality: N/A;     EDUCATION  The patient has been educated in the following areas:   Dysphagia (Swallowing Impairment) Oral Care/Hydration.    Impression: Mr. Delgado presents w/ wfl oropharyngeal swallow w/o s/s aspiration.No s/s indicative of silent aspiration.  No odynophagia reported.      He is felt to most benefit from least restrictive regular consistency po diet, thin liquids. Medications whole in puree.     SLP Recommendation and Plan    1. Regular consistency po diet, thin liquids.    2. Medications whole in puree/thins.   3. Upright and centered for all po intake  4. DK precautions.  5. Oral care protocol.  No further formal SLP f/u warranted at this time.    D/w Mr. Delgado results and recommendations w/ verbal agreement.    Thank you for allowing me to participate in the care of your patient-  Gunjan Marshall M.S., CCC/SLP                                                                        Time Calculation:       Therapy Charges for Today     Code Description Service Date Service Provider Modifiers Qty    17042684808 HC ST EVAL ORAL PHARYNG SWALLOW 4 3/11/2021 Gunjan Marshall, MS CCC-SLP GN 1               Gunjan Marshall, MS CCC-SLP  3/11/2021

## 2021-03-12 NOTE — PLAN OF CARE
Goal Outcome Evaluation:  Plan of Care Reviewed With: patient, spouse  Progress: improving  Outcome Summary: VSS. SR. RA. Pt. BP improved today. NS dc'd. Pt. c/o trouble urinating. Bladder scan showed 322ml. Provider notifed, instructed to I&O cath. Attempts by 2 RNs to I&O cath patient without success. MD notified again. Urology consulted, but there's no coverage until Monday. Pt. is able to urinate a little. Will monitor for worsening symptoms. No other complaints. Will continue to monitor.

## 2021-03-12 NOTE — PLAN OF CARE
Goal Outcome Evaluation:        Outcome Summary: Patient resting in bed at this time. Patient has been hypotensive this shift and MD was made aware. Patient has denied any complaints. Patient continues to have NS infusing. Call light within reach. Will continue to monitor.

## 2021-03-12 NOTE — PROGRESS NOTES
"Case Management/Social Work    Patient Name:  Aaron Delgado  YOB: 1945  MRN: [id number]  Admit Date:  3/10/2021        SS received consult for \"need help locating family as the patient is confused and may need somebody to make medical decisions based on how he does during this hospitalization\". SS spoke with Pt who was alert and oriented and with wife who was at bedside. Pt does not utilize any home health services or DME and does not feel that he will have any needs at discharge.     SS did not identify any needs and can be re-consulted if needed.     Electronically signed by:  Kristin Jenkins  03/12/21 13:22 EST  "

## 2021-03-12 NOTE — PROGRESS NOTES
Nephrology Progress Note      Subjective     Pt feels much better, BP has been improving.   No chest pain, SOB or lightheadedness.     Objective       Vital signs :     Temp:  [97.5 °F (36.4 °C)-98 °F (36.7 °C)] 97.7 °F (36.5 °C)  Heart Rate:  [56-64] 61  Resp:  [14-22] 16  BP: ()/(41-81) 100/55      Intake/Output Summary (Last 24 hours) at 3/12/2021 0724  Last data filed at 3/12/2021 0607  Gross per 24 hour   Intake 2362.91 ml   Output 500 ml   Net 1862.91 ml       Physical Exam:    General Appearance : not in acute distress  Lungs : clear to auscultation, respirations regular  Heart :  regular rhythm & normal rate, normal S1, S2 and no murmur, no rub  Abdomen : mildly distended. normal bowel sounds, no masses, no hepatomegaly, no splenomegaly, soft non-tender and no guarding  Extremities : moves extremities well, trace edema, no cyanosis and no redness  Skin :  no bleeding, bruising or rash  Neurologic :   orientated to person, place, time and situation, Grossly no focal deficits      Laboratory Data :     Albumin Albumin   Date Value Ref Range Status   03/10/2021 2.02 (L) 3.50 - 5.20 g/dL Final      Magnesium Magnesium   Date Value Ref Range Status   03/10/2021 2.4 1.6 - 2.4 mg/dL Final          PTH               No results found for: PTH    CBC and coagulation:  Results from last 7 days   Lab Units 03/12/21  0032 03/11/21  0006 03/10/21  2100 03/10/21  1940   PROCALCITONIN ng/mL  --   --  0.56*  --    LACTATE mmol/L  --  3.1* 3.3*  --    SED RATE mm/hr  --   --   --  35*   CRP mg/dL  --   --   --  12.85*   WBC 10*3/mm3 6.01  --   --  7.91   HEMOGLOBIN g/dL 11.5*  --   --  12.9*   HEMATOCRIT % 37.1*  --   --  40.6   MCV fL 94.4  --   --  92.5   MCHC g/dL 31.0*  --   --  31.8   PLATELETS 10*3/mm3 189  --   --  212   INR   --   --   --  2.71*     Acid/base balance:      Renal and electrolytes:  Results from last 7 days   Lab Units 03/12/21  0032 03/10/21  1940   SODIUM mmol/L 134* 132*   POTASSIUM mmol/L 5.1  5.5*   MAGNESIUM mg/dL  --  2.4   CHLORIDE mmol/L 108* 103   CO2 mmol/L 15.4* 14.9*   BUN mg/dL 54* 56*   CREATININE mg/dL 3.44* 4.12*   EGFR IF NONAFRICN AM mL/min/1.73 18* 14*   CALCIUM mg/dL 8.4* 8.8     Estimated Creatinine Clearance: 22 mL/min (A) (by C-G formula based on SCr of 3.44 mg/dL (H)).    Liver and pancreatic function:  Results from last 7 days   Lab Units 03/10/21  2100 03/10/21  1940   ALBUMIN g/dL  --  2.02*   BILIRUBIN mg/dL  --  0.9   ALK PHOS U/L  --  414*   AST (SGOT) U/L  --  91*   ALT (SGPT) U/L  --  39   AMMONIA umol/L 32  --    LIPASE U/L  --  103*         Cardiac:  Results from last 7 days   Lab Units 03/10/21  1940   PROBNP pg/mL 938.7*     Liver and pancreatic function:  Results from last 7 days   Lab Units 03/10/21  2100 03/10/21  1940   ALBUMIN g/dL  --  2.02*   BILIRUBIN mg/dL  --  0.9   ALK PHOS U/L  --  414*   AST (SGOT) U/L  --  91*   ALT (SGPT) U/L  --  39   AMMONIA umol/L 32  --    LIPASE U/L  --  103*       Medications :     albumin human, 12.5 g, Intravenous, TID With Meals  apixaban, 5 mg, Oral, Q12H  cefepime, 2 g, Intravenous, Q24H  finasteride, 5 mg, Oral, Daily  lactobacillus acidophilus, 1 capsule, Oral, Daily  levothyroxine, 137.5 mcg, Oral, Q AM  metroNIDAZOLE, 500 mg, Intravenous, Q8H  midodrine, 10 mg, Oral, TID AC  propafenone, 225 mg, Oral, Q8H  sodium chloride, 3 mL, Intravenous, Q12H      sodium chloride, 100 mL/hr, Last Rate: 100 mL/hr (03/11/21 2129)          Assessment/Plan   1. RHEA on CKD  2. Decompensated liver cirrhosis  3. Hyponatremia  4. Essential hypertension now with hypotension  5. DM-II    Cr significantly improved to 3.4, BP is getting better. Noted Midodrine dose was increased to 10mg tid. Urine sodium <20  DC IVF and continue IV albumin today as well. If Cr continue to improve, can be discharged home tomorrow with midodrine and I will see him in clinic to follow up.   Please call if any questions, sign off.     RHEA likely Likely 2/2 ATN or pre  renal due to hemodynamic changes due to profound hypotension, HRS  DC'd all BP medications including Metop, ACEi, amiodaraon, amlodipine in clinic  With decompensated liver cirrhosis, HRS is also on differential              Lucila Etienne MD  03/12/21  07:24 EST

## 2021-03-12 NOTE — PROGRESS NOTES
[facility]     Patient Identification:  Name:  Aaron Delgado  Age:  75 y.o.  Sex:  male  :  1945  MRN:  [id number]  Visit Number:  [id number]  Primary Care Provider:  Dillan Nur MD    Length of stay:  1    Chief complaint: None    Subjective:    Patient does report multiple bowel movements overnight where he had to wake up on 2 separate occasions.  However, he denies any fevers, chills, sweats, rigors, abdominal pain, nausea or vomiting.  He denies any other new symptoms overnight.  ----------------------------------------------------------------------------------------------------------------------  Current Hospital Meds:  albumin human, 12.5 g, Intravenous, TID With Meals  apixaban, 5 mg, Oral, Q12H  cefepime, 2 g, Intravenous, Q24H  finasteride, 5 mg, Oral, Daily  lactobacillus acidophilus, 1 capsule, Oral, Daily  levothyroxine, 137.5 mcg, Oral, Q AM  metroNIDAZOLE, 500 mg, Intravenous, Q8H  midodrine, 10 mg, Oral, TID AC  propafenone, 225 mg, Oral, Q8H  sodium chloride, 3 mL, Intravenous, Q12H         ----------------------------------------------------------------------------------------------------------------------  Vital Signs:  Temp:  [97.5 °F (36.4 °C)-98.1 °F (36.7 °C)] 98.1 °F (36.7 °C)  Heart Rate:  [56-64] 60  Resp:  [16-20] 16  BP: ()/(44-81) 111/64      21  0218 21  0354 21  0553   Weight: 80 kg (176 lb 4.8 oz) 82.5 kg (181 lb 12.8 oz) 82.7 kg (182 lb 4.8 oz)     Body mass index is 26.92 kg/m².    Intake/Output Summary (Last 24 hours) at 3/12/2021 1700  Last data filed at 3/12/2021 1600  Gross per 24 hour   Intake 1923.91 ml   Output 525 ml   Net 1398.91 ml     Diet Regular; Thin; Cardiac, Consistent Carbohydrate, Renal  ----------------------------------------------------------------------------------------------------------------------  Physical exam:  Constitutional: Well-nourished  male in no  apparent distress.     HENT:  Head:  Normocephalic and atraumatic.  Mouth:  Moist mucous membranes.    Eyes:  Conjunctivae and EOM are normal.  Pupils are equal, round, and reactive to light.  No scleral icterus.    Neck:  Neck supple. No thyromegaly.  No JVD present.    Cardiovascular:  Regular rate and rhythm with no murmurs, rubs, clicks or gallops appreciated.  Pulmonary/Chest:  Clear to auscultation bilaterally with no crackles, wheezes or rhonchi appreciated.  Abdominal:  Soft. Nontender.  Moderately to largely distended, positive fluid wave, bowel sounds are normal in all four quadrants. No organomegally appreciated.   Musculoskeletal:   No edema, no tenderness, and no deformity.  No red or swollen joints anywhere.    Neurological:  Alert and oriented to person, place, and time. Cranial nerves II-XII intact with no focal deficits.  No facial droop.  No slurred speech.   Skin:  Warm and dry to palpation with no rashes or lesions appreciated.  Peripheral vascular:  2+ radial and pedal pulses in bilateral upper and lower extremities.  Psychiatric:  Alert and oriented x3, demonstrates appropriate judgement and insight.  ----------------------------------------------------------------------------------------------------------------------  Tele:    ----------------------------------------------------------------------------------------------------------------------  Results from last 7 days   Lab Units 03/10/21  2206 03/10/21  1940   CK TOTAL U/L  --  43   TROPONIN T ng/mL <0.010 <0.010     Results from last 7 days   Lab Units 03/12/21  0032 03/11/21  0006 03/10/21  2100 03/10/21  1940   CRP mg/dL  --   --   --  12.85*   LACTATE mmol/L  --  3.1* 3.3*  --    WBC 10*3/mm3 6.01  --   --  7.91   HEMOGLOBIN g/dL 11.5*  --   --  12.9*   HEMATOCRIT % 37.1*  --   --  40.6   MCV fL 94.4  --   --  92.5   MCHC g/dL 31.0*  --   --  31.8   PLATELETS 10*3/mm3 189  --   --  212   INR   --   --   --  2.71*         Results from  last 7 days   Lab Units 03/12/21  0032 03/10/21  1940   SODIUM mmol/L 134* 132*   POTASSIUM mmol/L 5.1 5.5*   MAGNESIUM mg/dL  --  2.4   CHLORIDE mmol/L 108* 103   CO2 mmol/L 15.4* 14.9*   BUN mg/dL 54* 56*   CREATININE mg/dL 3.44* 4.12*   EGFR IF NONAFRICN AM mL/min/1.73 18* 14*   CALCIUM mg/dL 8.4* 8.8   GLUCOSE mg/dL 98 175*   ALBUMIN g/dL  --  2.02*   BILIRUBIN mg/dL  --  0.9   ALK PHOS U/L  --  414*   AST (SGOT) U/L  --  91*   ALT (SGPT) U/L  --  39   Estimated Creatinine Clearance: 22 mL/min (A) (by C-G formula based on SCr of 3.44 mg/dL (H)).    Ammonia   Date Value Ref Range Status   03/10/2021 32 16 - 60 umol/L Final         Blood Culture   Date Value Ref Range Status   03/10/2021 No growth at 24 hours  Preliminary   03/10/2021 No growth at 24 hours  Preliminary     Urine Culture   Date Value Ref Range Status   03/10/2021 No growth  Preliminary     No results found for: WOUNDCX  No results found for: STOOLCX    I have personally looked at the labs and they are summarized above.  ----------------------------------------------------------------------------------------------------------------------  Imaging Results (Last 24 Hours)     ** No results found for the last 24 hours. **        ----------------------------------------------------------------------------------------------------------------------  Assessment and Plan:    1.  Acute kidney injury -likely multifactorial from fluid shifts from recent paracentesis combined with episodes of hypotension and possible mild to moderate postobstructive uropathy.  Have discontinued IV fluids per nephrology recommendations.  Continue midodrine for now.  Will receive last infusion of albumin today.  Repeat BMP tomorrow.    2.  Hepatic cirrhosis -ascites is slowly recurring.  However, no need for paracentesis at this time.    3.  History of essential hypertension -patient now with hypotension, will hold antihypertensive medications at this time and maintain MAP  greater than 65 to ensure adequate renal perfusion.    4.  Type 2 diabetes mellitus -continue Accu-Cheks before every meal and nightly with sliding scale insulin    5.  Hyponatremia -nearly resolved    6.  Chronic normocytic anemia -stable, continue to monitor    7.  Chronic diastolic CHF -no evidence of exacerbation at this time    8.  History of CAD    9.  Paroxysmal A. Fib -currently in normal sinus rhythm, continue Rythmol and Eliquis        Ronaldo Christensen, DO   03/12/21   17:00 EST         Ronaldo Christensen, DO  03/12/21  17:00 EST

## 2021-03-13 PROBLEM — N40.1 BPH WITH OBSTRUCTION/LOWER URINARY TRACT SYMPTOMS: Status: ACTIVE | Noted: 2021-01-01

## 2021-03-13 PROBLEM — R74.01 ELEVATED TRANSAMINASE LEVEL: Status: ACTIVE | Noted: 2021-01-01

## 2021-03-13 PROBLEM — N17.9 AKI (ACUTE KIDNEY INJURY) (HCC): Status: ACTIVE | Noted: 2021-01-01

## 2021-03-13 PROBLEM — N17.9 ARF (ACUTE RENAL FAILURE) (HCC): Status: ACTIVE | Noted: 2021-01-01

## 2021-03-13 PROBLEM — R33.8 ACUTE URINARY RETENTION: Status: ACTIVE | Noted: 2021-01-01

## 2021-03-13 PROBLEM — E87.20 METABOLIC ACIDOSIS: Status: ACTIVE | Noted: 2021-01-01

## 2021-03-13 PROBLEM — E43 SEVERE MALNUTRITION (HCC): Status: ACTIVE | Noted: 2021-01-01

## 2021-03-13 PROBLEM — E88.09 HYPOALBUMINEMIA: Status: ACTIVE | Noted: 2021-01-01

## 2021-03-13 PROBLEM — N13.8 BPH WITH OBSTRUCTION/LOWER URINARY TRACT SYMPTOMS: Status: ACTIVE | Noted: 2021-01-01

## 2021-03-13 NOTE — NURSING NOTE
Patient has left the facility with [facility] to transport to [facility]. Patients spouse Hellen Delgado has been notified. Report previously called to [facility].

## 2021-03-13 NOTE — PAYOR COMM NOTE
"[facility]  [facility]  PHONE  [phone]  FAX  [phone]  NPI:  [id number]    PATIENT D/C 3/12/2021    Aaron Delgado (75 y.o. Male)     Date of Birth Social Security Number Address Home Phone MRN    1945  [address]  [place] [id number] [phone] [id number]    [employer] Mar[employer] Status          None        Admission Date Admission Type Admitting Provider Attending Provider Department, Room/Bed    3/10/21 Emergency Iveth Nunez MD  [facility] PROGRESS CARE, P204/S2    Discharge Date Discharge Disposition Discharge Destination        3/13/2021 [facility] ([facility] - [facility]              Attending Provider: (none)   Allergies: No Known Allergies    Isolation: None   Infection: None   Code Status: CPR    Ht: 175.3 cm (69\")   Wt: 82.7 kg (182 lb 4.8 oz)    Admission Cmt: None   Principal Problem: Hepatorenal syndrome (CMS/HCC) [K76.7]                 Active Insurance as of 3/10/2021     Primary Coverage     Payor Plan Insurance Group Employer/Plan Group    HUMANA MEDICARE REPLACEMENT HUMANA MEDICARE REPLACEMENT [id number]     Payor Plan Address Payor Plan Phone Number Payor Plan Fax Number Effective Dates    [address] [address] [phone]  1/1/2018 - None Entered    [facility] [id number]       Subscriber Name Subscriber Birth Date Member ID       AARON DELGADO 1945 [id number]                 Emergency Contacts      (Rel.) Home Phone Work Phone Mobile Phone    ANNETTE DELGADO (Spouse) [phone] -- --              "

## 2021-03-13 NOTE — DISCHARGE SUMMARY
[facility] HOSPITALISTS DISCHARGE SUMMARY    Patient Identification:  Name:  Aaron Delgado  Age:  75 y.o.  Sex:  male  :  1945  MRN:  [id number]  Visit Number:  [id number]    Date of Admission: 3/10/2021  Date of Discharge: 3/13/2021 to [facility] with Dr. Mcdowell as the accepting hospitalist and Dr. Marina as the accepting urology consult    PCP: Dillan Nru MD    DISCHARGE DIAGNOSES  -Acute kidney injury, suspect due to fluid shifts from recent high volume paracentesis and low blood pressures on top of chronic kidney disease stage versus hepatorenal syndrome  -Systemic inflammatory response syndrome with no acute infection found (lactic acid 3.3, CRP 12.85)  -Hyponatremia, suspect due to cirrhosis  -Chronic normocytic anemia, suspect anemia of chronic disease stage IIIa with baseline Cr of about 1.3-1.4 and admission Cr of 4.12  -Lactic acidosis, suspect dehydration  -Prolonged QTC of 476 ms  -History of nonalcoholic steatotic hepatitis with portal hypertension and ascites that is currently decompensated  -History of diastolic CHF, currently without an exacerbation  -History of coronary artery disease status post CABG in the distant past  -History of essential hypertension  -History of hypothyroidism  -History of hyperlipidemia  -History of paroxysmal atrial fibrillation, currently in normal sinus rhythm  -History of type 2 diabetes mellitus    CONSULTS   Dr. Etienne with nephrology    PROCEDURES PERFORMED  None    HOSPITAL COURSE  Patient is a 75 y.o. male presented to [facility] on 3/10/2021 due to low blood pressures at home; he had a blood pressure of 78/40 9 days after his first paracentesis, at which time 11.6 L were removed.  The patient has a history of AGUILERA and due to the hypotension he was admitted to the progressive care unit.  Please see the admitting history and physical for further details.  The patient was given IVF and nephrology was  consulted; the patient was diagnosed with RHEA due to ATN or prerenal due to hemodynamic changes due to profound hypotension versus hepatorenal syndrome as well.  Albumin IV was given.  Midodrine was also added to increase the blood pressures so as the perfuse the kidneys better.  Urine sodium was less than 20 and renal ultrasound was unremarkable.  The patient was started on cefepime and flagyl per our sepsis protocol as it was unclear at first if he had this but the antibiotics also provided prevention for SBP.  Then, late on 3/12/2021, the patient could not urinate.  Bladder scans showed over 500 ml of retained urine.  4 nurses and 1 doctor tried to insert a coates; the last attempt was an 18 triple lumen catheter; CBI was hooked up but it would not start flowing and very little urine was obtained.  Dr. Christensen called Dr. Marina with [facility] urology and he has agreed to consult as our hospital does not have urology coverage for 4 days.  I discussed this care with Dr. Mcdowell and she has accepted the patient in transfer.  Due to the seriousness of this issue, the patient is being flown to [facility].      VITAL SIGNS:  Temp:  [97.5 °F (36.4 °C)-98.1 °F (36.7 °C)] 97.5 °F (36.4 °C)  Heart Rate:  [56-73] 71  Resp:  [16-20] 18  BP: ()/(50-86) 113/69  SpO2:  [90 %-100 %] 97 %  Device (Oxygen Therapy): room air    Body mass index is 26.92 kg/m².  Wt Readings from Last 3 Encounters:   03/12/21 82.7 kg (182 lb 4.8 oz)   03/04/21 86.2 kg (190 lb)   03/01/21 86.5 kg (190 lb 12.8 oz)       PHYSICAL EXAM:  Vitals reviewed.   Constitutional:       General: He is in acute distress; he appears to be in pain.      Appearance: He is well-developed. He is not ill-appearing.      Interventions: Nasal cannula in place.      Comments: Chronically ill-appearing.   HENT:      Head: Normocephalic and atraumatic.      Right Ear: External ear normal.      Left Ear: External ear normal.      Nose: Nose  normal.   Eyes:      General: No scleral icterus.     Extraocular Movements: Extraocular movements intact.      Pupils: Pupils are equal, round, and reactive to light.   Cardiovascular:      Rate and Rhythm: Normal rate and regular rhythm.      Pulses: Normal pulses.      Heart sounds: No murmur.   Pulmonary:      Effort: Mild respiratory distress present.      Breath sounds: No stridor. No wheezing or rales.   Abdominal:      General: Abdomen is protuberant. Bowel sounds are normal. There is distension.      Palpations: Abdomen is soft. There is shifting dullness and fluid wave.      Tenderness: There is no abdominal tenderness.   Urologic:  Brandon catheter in place with minimal urine in the collection container; it is grossly bloody.  Musculoskeletal:         General: Swelling (less than 1+ pititng edema) present. No deformity or signs of injury.   Skin:     General: Skin is warm.      Capillary Refill: Capillary refill takes less than 2 seconds.      Coloration: Skin is not jaundiced.      Findings: No bruising or rash.   Neurological:      General: No focal deficit present.      Mental Status: He is alert and oriented to person, place, and time. Mental status is at baseline.      Cranial Nerves: No cranial nerve deficit.   Psychiatric:         Mood and Affect: Mood normal.         Behavior: Behavior normal. Behavior is cooperative.         Thought Content: Thought content normal.         Judgment: Judgment normal.       DISCHARGE DISPOSITION   Serious       Discharge Medications      New Medications      Instructions Start Date   albumin human 25 % 25%   12.5 g, Intravenous, 3 Times Daily With Meals      cefepime 2 G/ ML solution  Commonly known as: MAXIPIME   2 g, Intravenous, Every 24 Hours      dextrose 40 % gel  Commonly known as: GLUTOSE   15 g, Oral, Every 15 Minutes PRN      dextrose 50 % solution  Commonly known as: D50W   25 g, Intravenous, Every 15 Minutes PRN      glucagon (human recombinant) 1  MG injection  Commonly known as: GLUCAGEN DIAGNOSTIC   1 mg, Subcutaneous, Every 15 Minutes PRN      lactobacillus acidophilus capsule capsule   1 capsule, Oral, Daily      Lidocaine HCl Urethral/Mucosal 2% 2 % gel  Commonly known as: XYLOCAINE   Topical, As Needed      Lidocaine HCl Urethral/Mucosal 2% 2 % gel  Commonly known as: XYLOCAINE   Topical, As Needed      metroNIDAZOLE 5-0.79 MG/ML-% IVPB  Commonly known as: FLAGYL   500 mg, Intravenous, Every 8 Hours      morphine 2 MG/ML injection   2 mg, Intravenous, Every 2 Hours PRN      nitroglycerin 0.4 MG SL tablet  Commonly known as: NITROSTAT   0.4 mg, Sublingual, Every 5 Minutes PRN, Take no more than 3 doses in 15 minutes.      triamcinolone 0.1 % cream  Commonly known as: KENALOG  Replaces: fluticasone 0.05 % cream   Topical, 2 Times Daily PRN         Changes to Medications      Instructions Start Date   midodrine 10 MG tablet  Commonly known as: PROAMATINE  What changed:   · medication strength  · how much to take  · additional instructions   10 mg, Oral, 3 Times Daily Before Meals         Continue These Medications      Instructions Start Date   apixaban 5 MG tablet tablet  Commonly known as: ELIQUIS   5 mg, Oral, 2 Times Daily      finasteride 5 MG tablet  Commonly known as: PROSCAR   5 mg, Oral, Daily      levothyroxine 137 MCG tablet  Commonly known as: SYNTHROID, LEVOTHROID   137 mcg, Oral, Daily      propafenone 225 MG tablet  Commonly known as: RYTHMOL   225 mg, Oral, Every 8 Hours         Stop These Medications    doxazosin 4 MG tablet  Commonly known as: CARDURA     fluticasone 0.05 % cream  Commonly known as: CUTIVATE  Replaced by: triamcinolone 0.1 % cream     hydroCHLOROthiazide 12.5 MG tablet  Commonly known as: HYDRODIURIL     metFORMIN  MG 24 hr tablet  Commonly known as: GLUCOPHAGE-XR     metoprolol succinate XL 50 MG 24 hr tablet  Commonly known as: TOPROL-XL     rosuvastatin 20 MG tablet  Commonly known as: CRESTOR     tamsulosin 0.4  MG capsule 24 hr capsule  Commonly known as: FLOMAX            Diet Instructions     Diet: Nothing By Mouth      Discharge Diet: Nothing By Mouth        Activity Instructions     Other Activity Instructions      Activity Instructions: Bed rest    Other Activity Restrictions      Type of Restriction: Other    Explain Other Restrictions: Falls precautions        Follow-up Information     Dillan Nur MD .    Specialty: Family Medicine  Contact information:  [address] [facility] [address] [phone]              TEST  RESULTS PENDING AT DISCHARGE  Pending Labs     Order Current Status    Blood Culture - Blood, Arm, Right Preliminary result    Blood Culture - Blood, Arm, Right Preliminary result    Urine Culture - Urine, Urine, Clean Catch Preliminary result           CODE STATUS  Code Status and Medical Interventions:   Ordered at: 03/11/21 0133     Level Of Support Discussed With:    Patient     Code Status:    CPR     Medical Interventions (Level of Support Prior to Arrest):    Full       Iveth Nunez MD  [facility] Hospitalist  03/13/21  00:46 EST    Please note that this discharge summary required more than 30 minutes to complete.

## 2021-03-21 NOTE — ED PROVIDER NOTES
Subjective     History provided by:  Patient   used: No    Weakness - Generalized  Severity:  Severe  Onset quality:  Gradual  Timing:  Constant  Progression:  Worsening  Chronicity:  Chronic  Context: not alcohol use, not allergies, not change in medication, not decreased sleep, not dehydration, not drug use, not increased activity, not pinched nerve, not recent infection, not stress and not urinary tract infection    Relieved by:  Nothing  Worsened by:  Activity  Ineffective treatments:  Drinking fluids, lying down, rest and sleep  Associated symptoms: abdominal pain, myalgias, nausea and near-syncope    Associated symptoms: no anorexia, no aphasia, no arthralgias, no ataxia, no chest pain, no cough, no diarrhea, no difficulty walking, no dizziness, no drooling, no dysphagia, no dysuria, no numbness in extremities, no falls, no fever, no foul-smelling urine, no frequency, no headaches, no hematochezia, no lethargy, no loss of consciousness, no melena, no seizures, no sensory-motor deficit, no shortness of breath, no stroke symptoms, no syncope, no urgency, no vision change and no vomiting    Risk factors: coronary artery disease and heart disease    Risk factors: no anemia, no congestive heart failure, no diabetes, no excessive menstruation, no family hx of stroke, no neurologic disease, no new medications and no recent stressors        Review of Systems   Constitutional: Positive for fatigue. Negative for activity change, appetite change, chills, diaphoresis and fever.   HENT: Negative for congestion, drooling, ear pain and sore throat.    Eyes: Negative for redness.   Respiratory: Negative for cough, chest tightness, shortness of breath and wheezing.    Cardiovascular: Positive for near-syncope. Negative for chest pain, palpitations, leg swelling and syncope.   Gastrointestinal: Positive for abdominal pain and nausea. Negative for anorexia, diarrhea, dysphagia, hematochezia, melena and  vomiting.   Genitourinary: Negative for dysuria, frequency and urgency.   Musculoskeletal: Positive for myalgias. Negative for arthralgias, back pain, falls and neck pain.   Skin: Positive for pallor. Negative for rash and wound.   Neurological: Positive for weakness. Negative for dizziness, seizures, loss of consciousness, speech difficulty and headaches.   Psychiatric/Behavioral: Negative for agitation, behavioral problems, confusion and decreased concentration.   All other systems reviewed and are negative.      Past Medical History:   Diagnosis Date   • Arthritis    • Ascites    • Benign prostatic hyperplasia    • Coronary artery disease    • Diastolic CHF (CMS/HCC)    • GERD (gastroesophageal reflux disease)    • Hyperlipidemia    • Hypertension    • Hypothyroidism due to Hashimoto's thyroiditis 9/25/2020   • Non-alcoholic cirrhosis (CMS/HCC)    • PAF (paroxysmal atrial fibrillation) (CMS/HCC)    • Portal hypertension (CMS/HCC)    • Ptosis of eyelid, bilateral    • Type 2 diabetes mellitus (CMS/HCC)    • Vitamin B12 deficiency        No Known Allergies    Past Surgical History:   Procedure Laterality Date   • CARDIAC SURGERY     • CHOLECYSTECTOMY     • COLONOSCOPY  01/2020    Dr. Fine- tubular adenomatous colon polyps x2   • CORONARY ARTERY BYPASS GRAFT     • ENDOSCOPY     • ENDOSCOPY N/A 8/17/2020    Procedure: ESOPHAGOGASTRODUODENOSCOPY WITH BIOPSY CPT CODE: 64142;  Surgeon: Andrew Barnett MD;  Location: [facility];  Service: Gastroenterology;  Laterality: N/A;       Family History   Problem Relation Age of Onset   • No Known Problems Father    • No Known Problems Mother        Social History     Socioeconomic History   • Marital status:      Spouse name: Not on file   • Number of children: Not on file   • Years of education: Not on file   • Highest education level: Not on file   Tobacco Use   • Smoking status: Never Smoker   • Smokeless tobacco: Never Used   Vaping Use   • Vaping Use:  Never used   Substance and Sexual Activity   • Alcohol use: No   • Drug use: No   • Sexual activity: Defer           Objective   Physical Exam  Vitals and nursing note reviewed.   Constitutional:       General: He is not in acute distress.     Appearance: Normal appearance. He is well-developed. He is ill-appearing. He is not toxic-appearing or diaphoretic.   HENT:      Head: Normocephalic and atraumatic.      Right Ear: External ear normal.      Left Ear: External ear normal.      Nose: Nose normal.      Mouth/Throat:      Pharynx: No oropharyngeal exudate.      Tonsils: No tonsillar exudate.   Eyes:      General: Lids are normal.      Conjunctiva/sclera: Conjunctivae normal.      Pupils: Pupils are equal, round, and reactive to light.   Neck:      Thyroid: No thyromegaly.   Cardiovascular:      Rate and Rhythm: Normal rate and regular rhythm.      Pulses: Normal pulses.      Heart sounds: Normal heart sounds, S1 normal and S2 normal.   Pulmonary:      Effort: Pulmonary effort is normal. No tachypnea or respiratory distress.      Breath sounds: Normal breath sounds. No decreased breath sounds, wheezing or rales.   Chest:      Chest wall: No tenderness.   Abdominal:      General: Bowel sounds are normal. There is distension.      Palpations: Abdomen is soft.      Tenderness: There is abdominal tenderness. There is no guarding or rebound.   Musculoskeletal:         General: No tenderness or deformity. Normal range of motion.      Cervical back: Full passive range of motion without pain, normal range of motion and neck supple.      Right lower leg: Edema present.      Left lower leg: Edema present.   Lymphadenopathy:      Cervical: No cervical adenopathy.   Skin:     General: Skin is warm and dry.      Capillary Refill: Capillary refill takes 2 to 3 seconds.      Coloration: Skin is not pale.      Findings: No erythema or rash.   Neurological:      Mental Status: He is alert and oriented to person, place, and time.       GCS: GCS eye subscore is 4. GCS verbal subscore is 5. GCS motor subscore is 6.      Cranial Nerves: No cranial nerve deficit.      Sensory: No sensory deficit.   Psychiatric:         Speech: Speech normal.         Behavior: Behavior normal.         Thought Content: Thought content normal.         Judgment: Judgment normal.         Procedures           ED Course  ED Course as of Mar 20 2205   Thu Mar 11, 2021   0047 IMPRESSION:  1. There is a right pleural effusion at the lung base with associated consolidation/atelectasis. There may be potential underlying infection/pneumonia.  2. There is no definite acute intra-abdominal abnormality. No definite acute intra-abdominal inflammatory process is appreciated. There is cirrhosis with portal hypertension and gross ascites.   CT Chest Without Contrast Diagnostic [ES]   0047 IMPRESSION:  1. There is a right pleural effusion at the lung base with associated consolidation/atelectasis. There may be potential underlying infection/pneumonia.  2. There is no definite acute intra-abdominal abnormality. No definite acute intra-abdominal inflammatory process is appreciated. There is cirrhosis with portal hypertension and gross ascites.   CT Abdomen Pelvis Without Contrast [ES]   Sat Mar 20, 2021   2205 Vent. Rate : 065 BPM     Atrial Rate : 064 BPM     P-R Int : 000 ms          QRS Dur : 128 ms      QT Int : 458 ms       P-R-T Axes : 000 -02 022 degrees     QTc Int : 476 ms     Sinus rhythm  with 1st degree AV block  Nonspecific intraventricular block  Cannot rule out Anterior infarct , age undetermined  Abnormal ECG  When compared with ECG of 10-DEC-2019 19:36,  low voltage   ECG 12 Lead [ES]      ED Course User Index  [ES] Elton Ascencio MD                                           MDM  Number of Diagnoses or Management Options  Acute renal failure superimposed on chronic kidney disease, unspecified CKD stage, unspecified acute renal failure type (CMS/HCC): new and  requires workup  Cirrhosis of liver with ascites, unspecified hepatic cirrhosis type (CMS/HCC): new and requires workup  Hepatorenal syndrome (CMS/HCC): new and requires workup  Hypotension, unspecified hypotension type: new and requires workup  Weakness: new and requires workup     Amount and/or Complexity of Data Reviewed  Clinical lab tests: reviewed and ordered  Tests in the radiology section of CPT®: reviewed and ordered  Tests in the medicine section of CPT®: ordered and reviewed  Review and summarize past medical records: yes  Discuss the patient with other providers: yes  Independent visualization of images, tracings, or specimens: yes    Risk of Complications, Morbidity, and/or Mortality  Presenting problems: high  Diagnostic procedures: high  Management options: high    Critical Care  Total time providing critical care: > 105 minutes    Patient Progress  Patient progress: other (comment) (Critical.)      Final diagnoses:   Hepatorenal syndrome (CMS/HCC)   Weakness   Hypotension, unspecified hypotension type   Acute renal failure superimposed on chronic kidney disease, unspecified CKD stage, unspecified acute renal failure type (CMS/HCC)   Cirrhosis of liver with ascites, unspecified hepatic cirrhosis type (CMS/HCC)       ED Disposition  ED Disposition     ED Disposition Condition Comment    Decision to Admit  Level of Care: Progressive Care [20]   Diagnosis: Hepatorenal syndrome (CMS/HCC) [572.4.ICD-9-CM]   Certification: I Certify That Inpatient Hospital Services Are Medically Necessary For Greater Than 2 Midnights            Dillan Nur MD  [address] [id number] [phone]          [address] [address] [phone]             Medication List      New Prescriptions    dextrose 40 % gel  Commonly known as: GLUTOSE  Take 15 g by mouth Every 15 (Fifteen) Minutes As Needed for Low Blood Sugar (Blood sugar less than 70).      glucagon (human recombinant) 1 MG injection  Commonly known as: GLUCAGEN DIAGNOSTIC  Inject 1 mg under the skin into the appropriate area as directed Every 15 (Fifteen) Minutes As Needed (Blood Glucose Less Than 70) for up to 360 days.     lactobacillus acidophilus capsule capsule  Take 1 capsule by mouth Daily.     nitroglycerin 0.4 MG SL tablet  Commonly known as: NITROSTAT  Place 1 tablet under the tongue Every 5 (Five) Minutes As Needed for Chest Pain (Only if SBP Greater Than 100). Take no more than 3 doses in 15 minutes.     triamcinolone 0.1 % cream  Commonly known as: KENALOG  Apply  topically to the appropriate area as directed 2 (Two) Times a Day As Needed (to area(s) of psoriasis).  Replaces: fluticasone 0.05 % cream        Stop    doxazosin 4 MG tablet  Commonly known as: CARDURA     fluticasone 0.05 % cream  Commonly known as: CUTIVATE  Replaced by: triamcinolone 0.1 % cream     hydroCHLOROthiazide 12.5 MG tablet  Commonly known as: HYDRODIURIL     metFORMIN  MG 24 hr tablet  Commonly known as: GLUCOPHAGE-XR     metoprolol succinate XL 50 MG 24 hr tablet  Commonly known as: TOPROL-XL     midodrine 5 MG tablet  Commonly known as: PROAMATINE     rosuvastatin 20 MG tablet  Commonly known as: CRESTOR     tamsulosin 0.4 MG capsule 24 hr capsule  Commonly known as: FLOMAX           Where to Get Your Medications      Information about where to get these medications is not yet available    Ask your nurse or doctor about these medications  · dextrose 40 % gel  · glucagon (human recombinant) 1 MG injection  · lactobacillus acidophilus capsule capsule  · nitroglycerin 0.4 MG SL tablet  · triamcinolone 0.1 % cream          Elton Ascencio MD  03/20/21 8251       Elton Ascencio MD  04/20/21 8757

## 2021-04-19 PROBLEM — E55.9 VITAMIN D DEFICIENCY: Status: ACTIVE | Noted: 2021-04-19

## 2022-08-11 NOTE — PROGRESS NOTES
"  History of Present Illness   Aaron Delgado is a 75 y.o. male who presents to the clinic today pertaining to his DM, type 2. He reports he has a \"yeast infection\" which started three to four days ago.  In addition, he has  Hypertension, Dyslipidemia, and Atrial Fib.      Diabetes    He has type 2 diabetes mellitus. Pertinent negatives for diabetes include no chest pain, no polydipsia, no polyphagia and no polyuria. Risk factors for coronary artery disease include diabetes mellitus, dyslipidemia, hypertension and sedentary lifestyle. Current diabetes treatment includes oral agent (triple therapy) which includes Empaglifozin which he has had several episodes of genital mycotic infection. Today, he is c/o genital itchiness and mild rash.    Lab Results   Component Value Date    HGBA1C 6.20 (H) 01/13/2021     Hypertension   The problem is controlled. Pertinent negatives include no chest pain or shortness of breath. Risk factors for coronary artery disease include dyslipidemia, male gender and sedentary lifestyle. Current antihypertension treatment includes beta blockers, ACEI, and calcium channel blockers.   Lab Results   Component Value Date    CREATININE 1.70 (H) 01/13/2021      Dyslipidemia    Current antihyperlipidemic treatment includes statins . Risk factors for coronary artery disease include diabetes mellitus, dyslipidemia, hypertension and a sedentary lifestyle.   Lab Results   Component Value Date    CHLPL 65 01/13/2021    TRIG 127 01/13/2021    HDL 16 (L) 01/13/2021    LDL 26 01/13/2021     Atrial Fibrillation  Symptoms include dizziness and hypertension. Symptoms are negative for chest pain, palpitations, shortness of breath, tachycardia and weakness. Past medical history includes atrial fibrillation. He is followed by Dr Osborne Medications include Amiodarone, Rhymol and Eliquis.      The following portions of the patient's history were reviewed and updated as appropriate: allergies, current medications, " Personalized Preventive Plan for Kamari Quiet - 8/11/2022  Medicare offers a range of preventive health benefits. Some of the tests and screenings are paid in full while other may be subject to a deductible, co-insurance, and/or copay. Some of these benefits include a comprehensive review of your medical history including lifestyle, illnesses that may run in your family, and various assessments and screenings as appropriate. After reviewing your medical record and screening and assessments performed today your provider may have ordered immunizations, labs, imaging, and/or referrals for you. A list of these orders (if applicable) as well as your Preventive Care list are included within your After Visit Summary for your review. Other Preventive Recommendations:    A preventive eye exam performed by an eye specialist is recommended every 1-2 years to screen for glaucoma; cataracts, macular degeneration, and other eye disorders. A preventive dental visit is recommended every 6 months. Try to get at least 150 minutes of exercise per week or 10,000 steps per day on a pedometer . Order or download the FREE \"Exercise & Physical Activity: Your Everyday Guide\" from [employer]. Call 0-363.838.1629 or search [employer] online. You need 6983-1753 mg of calcium and 2992-2769 IU of vitamin D per day. It is possible to meet your calcium requirement with diet alone, but a vitamin D supplement is usually necessary to meet this goal.  When exposed to the sun, use a sunscreen that protects against both UVA and UVB radiation with an SPF of 30 or greater. Reapply every 2 to 3 hours or after sweating, drying off with a towel, or swimming. Always wear a seat belt when traveling in a car. Always wear a helmet when riding a bicycle or motorcycle. past family history, past medical history, past social history, past surgical history and problem list.    Current Outpatient Medications:   •  amiodarone (PACERONE) 200 MG tablet, Take 1 tablet by mouth Daily., Disp: 90 tablet, Rfl: 3  •  amLODIPine (NORVASC) 10 MG tablet, Take 1 tablet by mouth Every Evening., Disp: 30 tablet, Rfl: 5  •  benazepril (LOTENSIN) 40 MG tablet, TAKE 1 TABLET DAILY (WILL REPLACE THE 20 MG DOSE), Disp: 90 tablet, Rfl: 3  •  BISACODYL 5 MG EC tablet, TK 4 TS PO FOR1 DOSE PRF CONSTIPATION, Disp: , Rfl:   •  clotrimazole-betamethasone (LOTRISONE) 1-0.05 % cream, Apply  topically to the appropriate area as directed 2 (Two) Times a Day., Disp: 45 g, Rfl: 0  •  doxazosin (CARDURA) 2 MG tablet, AT THE START OF THERAPY, TAKE ONE-HALF (1/2) TABLET NIGHTLY FOR 1 WEEK, THEN 1 TABLET NIGHTLY AFTERWARD, Disp: 90 tablet, Rfl: 3  •  ELIQUIS 5 MG tablet tablet, TAKE 1 TABLET EVERY 12 HOURS, Disp: 180 tablet, Rfl: 3  •  Empagliflozin-Linaglip-Metform (Trijardy XR) 25-5-1000 MG tablet sustained-release 24 hour, Take 0.5 tablets by mouth Every Morning., Disp: 90 tablet, Rfl: 3  •  fluticasone (CUTIVATE) 0.05 % cream, Apply  topically to the appropriate area as directed 2 (Two) Times a Day., Disp: 30 g, Rfl: 0  •  levothyroxine (SYNTHROID, LEVOTHROID) 137 MCG tablet, Take 1 tablet by mouth Daily., Disp: 30 tablet, Rfl: 5  •  lidocaine (LIDODERM) 5 %, Place 1 patch on the skin as directed by provider Daily. Remove & Discard patch within 12 hours or as directed by MD, Disp: 90 patch, Rfl: 1  •  LORazepam (ATIVAN) 0.5 MG tablet, Take 1 tablet by mouth Every 8 (Eight) Hours As Needed (DIZZINESS)., Disp: 10 tablet, Rfl: 0  •  magnesium citrate 1.745 GM/30ML solution solution,  ML PO FOR 1 DOSE, Disp: , Rfl:   •  metFORMIN (GLUCOPHAGE) 500 MG tablet, Take 1 tablet by mouth Daily With Breakfast., Disp: 60 tablet, Rfl: 5  •  metoprolol succinate XL (TOPROL-XL) 50 MG 24 hr tablet, TAKE 1 TABLET EVERY NIGHT,  Disp: 90 tablet, Rfl: 3  •  ondansetron (ZOFRAN) 4 MG tablet, Take 1 tablet by mouth Every 8 (Eight) Hours As Needed for Vomiting., Disp: 30 tablet, Rfl: 0  •  propafenone (RYTHMOL) 225 MG tablet, Take 1 tablet by mouth Every 8 (Eight) Hours., Disp: 270 tablet, Rfl: 3  •  rosuvastatin (CRESTOR) 20 MG tablet, TAKE 1 TABLET EVERY NIGHT, Disp: 90 tablet, Rfl: 3  •  sildenafil (VIAGRA) 100 MG tablet, Take 1 tablet by mouth Daily As Needed for Erectile Dysfunction., Disp: 30 tablet, Rfl: 2  •  vitamin B-12 (CYANOCOBALAMIN) 1000 MCG tablet, Take 1 tablet by mouth Daily., Disp: 30 tablet, Rfl: 5  •  Zoster Vac Recomb Adjuvanted (Shingrix) 50 MCG/0.5ML reconstituted suspension, Inject 0.5 mL into the appropriate muscle as directed by prescriber See Admin Instructions. Repeat in 2-6 months, Disp: 1 each, Rfl: 1  •  fluconazole (Diflucan) 150 MG tablet, Take 1 tablet by mouth Daily for 3 days., Disp: 3 tablet, Rfl: 1  •  nystatin (MYCOSTATIN) 734693 UNIT/GM cream, Apply  topically to the appropriate area as directed As Needed (rasg)., Disp: 30 g, Rfl: 2    Current Facility-Administered Medications:   •  cyanocobalamin injection 1,000 mcg, 1,000 mcg, Intramuscular, Q28 Days, Dillan Nur MD, 1,000 mcg at 06/06/19 1359    No Known Allergies    Review of Systems   Constitutional: Positive for fatigue. Negative for activity change, appetite change, chills, fever and unexpected weight change.   HENT: Negative.    Eyes: Negative for visual disturbance.   Respiratory: Negative for cough, chest tightness, shortness of breath and wheezing.    Cardiovascular: Negative for chest pain, palpitations and leg swelling.   Gastrointestinal: Negative for abdominal pain, constipation, diarrhea, nausea and vomiting.   Endocrine: Negative for cold intolerance, heat intolerance, polydipsia, polyphagia and polyuria.   Genitourinary: Negative for discharge, genital sores, penile pain and penile swelling.   Musculoskeletal: Positive for  "arthralgias and back pain.   Skin: Positive for rash. Negative for color change.   Neurological: Positive for dizziness and weakness. Negative for tremors, speech difficulty, light-headedness and headaches.   Hematological: Negative for adenopathy. Bruises/bleeds easily.   Psychiatric/Behavioral: Negative for confusion, decreased concentration and suicidal ideas. The patient is not nervous/anxious.    All other systems reviewed and are negative.    Visit Vitals  /60 (BP Location: Right arm, Patient Position: Sitting, Cuff Size: Adult)   Pulse 94   Temp 96.8 °F (36 °C) (Temporal)   Ht 175.3 cm (69.02\")   Wt 83.9 kg (185 lb)   SpO2 95%   BMI 27.31 kg/m²         Physical Exam  Vitals signs reviewed.   Constitutional:       General: He is not in acute distress.     Appearance: He is well-developed.      Comments: Pleasant; Wearing appropriate face covering   HENT:      Head: Normocephalic.      Nose: Nose normal.   Eyes:      General: No scleral icterus.        Right eye: No discharge.         Left eye: No discharge.      Conjunctiva/sclera: Conjunctivae normal.   Neck:      Musculoskeletal: Neck supple.      Thyroid: No thyromegaly.      Vascular: No JVD.   Cardiovascular:      Rate and Rhythm: Normal rate and regular rhythm.      Heart sounds: Normal heart sounds. No murmur. No friction rub.   Pulmonary:      Effort: Pulmonary effort is normal. No respiratory distress.      Breath sounds: Normal breath sounds. No wheezing or rales.   Abdominal:      General: Bowel sounds are normal. There is distension.      Palpations: Abdomen is soft.      Tenderness: There is no abdominal tenderness. There is no guarding.   Musculoskeletal:      Right lower leg: No edema.      Left lower leg: No edema.   Lymphadenopathy:      Cervical: No cervical adenopathy.   Skin:     General: Skin is warm and dry.      Capillary Refill: Capillary refill takes less than 2 seconds.   Neurological:      Mental Status: He is alert and " oriented to person, place, and time.   Psychiatric:         Attention and Perception: Attention normal.         Mood and Affect: Mood normal.         Behavior: Behavior is cooperative.         Thought Content: Thought content normal.         Judgment: Judgment normal.       Assessment/Plan   Diagnoses and all orders for this visit:    1. Candidiasis (Primary)  Comments:  Findings and recommendations discussed with Aaron. Treatment options reviewed.   Orders:  -     fluconazole (Diflucan) 150 MG tablet; Take 1 tablet by mouth Daily for 3 days.  Dispense: 3 tablet; Refill: 1  -     nystatin (MYCOSTATIN) [id number] UNIT/GM cream; Apply  topically to the appropriate area as directed As Needed (rasg).  Dispense: 30 g; Refill: 2    2. Type 2 diabetes mellitus with peripheral neuropathy (CMS/HCC)  Comments:  Well controlled with A1C of 6.2    3. Essential hypertension  Comments:  Well controlled    4. Mixed hyperlipidemia  Comments:  Well controlled    Findings and recommendations discussed with Aaron. Treatment options reviewed. Counseled regarding supportive care measures. He has f/u with Dr Nur soon to review his recent labs and follow up care. S/S of concern reviewed and if occur to seek further medical evaluation.     This document has been electronically signed by AMARILIS Stewart, EDEN-BC, BRAULIO  January 20, 2021 17:12 EST

## 2024-02-29 NOTE — PATIENT INSTRUCTIONS
-Keep your wound dressing clean, dry, and intact. Only change dressing if it's over saturated, soiled or falls off.     -Change your dressing if it becomes soiled, soaked, or falls off.    -Wound vac may not have any drainage in tube or cannister & it will still be working.   Change cannister if it does become full by pressing tab on side of machine to remove canister and snap on new one. Full canister can be thrown in the trash. If cannister fills with bright red blood - go to ER. Dressing will be changed every MWF at the wound clini.  If you are having issues with your wound VAC, please consider patching leaks, changing the canister, or calling [phone] for troubleshooting. If the wound VAC has been off or un-operational for over 2 hours, call wound care center to inform them and remove all dressings including black foam and replace with normal saline damp gauze.     -Total contact casting - Do not get cast wet. If wet, call clinic immediately for removal. Do not walk on cast without boot. If you are experiencing any pain, call your provider. If the wound center is closed, go to the emergency room.    Change colostomies every 5-7 days. Change appliance immediately if it is leaking or peristomal skin feels irritated, has itching, or  burning.   To change the appliance, remove previous appliance, cleanse peristomal skin with warm water/washcloth, pat dry, make an ostomy template or use cardboard measuring guide and trace ostomy shape onto back of barrier, cut out barrier, apply a paste ring around barrier opening and apply appliance. Empty pouches when no more than ½ full. Check contents every 2 hours or as needed. Do not leave soap residue on tissue and do not use baby wipes or skin prep wipes.     -Should you experience any significant changes in your wound(s), such as infection (redness, swelling, localized heat, increased pain, fever > 101 F, chills) or have any questions regarding your home care  Vertigo  Vertigo means that you feel like you are moving when you are not. Vertigo can also make you feel like things around you are moving when they are not. This feeling can come and go at any time. Vertigo often goes away on its own.  Follow these instructions at home:  · Avoid making fast movements.  · Avoid driving.  · Avoid using heavy machinery.  · Avoid doing any task or activity that might cause danger to you or other people if you would have a vertigo attack while you are doing it.  · Sit down right away if you feel dizzy or have trouble with your balance.  · Take over-the-counter and prescription medicines only as told by your doctor.  · Follow instructions from your doctor about which positions or movements you should avoid.  · Drink enough fluid to keep your pee (urine) clear or pale yellow.  · Keep all follow-up visits as told by your doctor. This is important.  Contact a doctor if:  · Medicine does not help your vertigo.  · You have a fever.  · Your problems get worse or you have new symptoms.  · Your family or friends see changes in your behavior.  · You feel sick to your stomach (nauseous) or you throw up (vomit).  · You have a “pins and needles” feeling or you are numb in part of your body.  Get help right away if:  · You have trouble moving or talking.  · You are always dizzy.  · You pass out (faint).  · You get very bad headaches.  · You feel weak or have trouble using your hands, arms, or legs.  · You have changes in your hearing.  · You have changes in your seeing (vision).  · You get a stiff neck.  · Bright light starts to bother you.  This information is not intended to replace advice given to you by your health care provider. Make sure you discuss any questions you have with your health care provider.  Document Released: 09/26/2009 Document Revised: 05/25/2017 Document Reviewed: 04/11/2016  [employer] Interactive Patient Education © 2019 [employer]     instructions, please contact the wound center at [phone]. If after hours, contact your primary care physician or go to the hospital emergency room.

## (undated) DEVICE — GOWN,REINF,POLY,ECL,PP SLV,XL: Brand: MEDLINE

## (undated) DEVICE — Device

## (undated) DEVICE — FRCP BX RADJAW4 NDL 2.8 240CM LG OG BX40

## (undated) DEVICE — THE BITE BLOCK MAXI, LATEX FREE STRAP IS USED TO PROTECT THE ENDOSCOPE INSERTION TUBE FROM BEING BITTEN BY THE PATIENT.

## (undated) DEVICE — SYR LUERLOK 30CC